# Patient Record
Sex: FEMALE | Race: WHITE | NOT HISPANIC OR LATINO | Employment: FULL TIME | URBAN - METROPOLITAN AREA
[De-identification: names, ages, dates, MRNs, and addresses within clinical notes are randomized per-mention and may not be internally consistent; named-entity substitution may affect disease eponyms.]

---

## 2017-03-24 NOTE — DISCHARGE INSTRUCTIONS
"Post-Operative Instructions Hindfoot/Ankle/Tendon Surgery Patients  J Carlos Mcdonald M.D.    Board Certified  Fellowship, Foot and Ankle Surgery  Member, American Academy of Orthopaedic Surgeons  American Orthopaedic Foot and Ankle Society    Direct Phone 9:00am to 5:00pm [phone]  After Hours/24 Hour On Call [phone]    Diet:  ? Take all prescribed medications with food   ? Narcotic pain medication can cause constipation  ? Avoid alcoholic beverages while taking pain medications   ? Increase dietary fiber protein rich foods, fluids post operatively    Activity:  ? Elevate operative foot 90% of the time.  ? \"Swelling runs downhill\" - the more you elevate, the less permanent swelling you will experience  ? Strict non-weight bearing on operative leg.  Crutches, walker, scooter or wheelchair must be used for mobilization.  ? You may be up to the bathroom, to the kitchen for meals and to change locations in the house.  ? You may do sit-ups (NOT BONE GRAFT PATIENTS), leg raises, upper body exercises  ? Adjust your immobilized foot/ankle to relieve pressure on your heel    Special Care Instructions:     ? DO NOT CHANGE OR ALTER THE CAST  ? Keep your cast/splint dry  ? Sponge bath or wrap seal your cast for shower  ? If you have an ILIAC CREST BONE GRAFT, No shower until staples removed   ? Change hip dressing daily:    ? Clean incision with hydrogen peroxide   ? Cover with dry sterile gauze and minimal tape  ? It is not unusual to have some \"numbness\" in foot and ankle following surgery    Report to your Doctor if any of the following occur:  ? Elevated temperature, 101o or higher  ? Increased pain unrelieved by pain medication and/or elevation  ? Tight/loose/wet cast  ? Increased swelling in foot or hip  ? Calf pain  ? Hip graft site drainage  ? For any shortness of breath, DIAL 911, go to the Emergency Room  Medications:  ? Take all of the following medications with food.    ? Aspirin/Ecotrin 325 mg.:  1 " tab 1x/day  ? This is for blood clot prevention  ? If you have a sensitive stomach, Ecotrin can be less irritating  ? If you experience any unusual bruising or bleeding or ringing in the ears, stop this medication and call us  ? Oxycodone  1-2 every 3-4 hours as needed for pain  o You may take 1 tablet with plain Tylenol or Ibuprofen for less severe pain or to decrease nausea/mental effect  ?  Hydrocodone  1-2 every 3-4 hours as needed for pain  o You may take 1 tablet with plain Tylenol or Ibuprofen for less severe pain or to decrease nausea/mental effects  ? Ibuprofen 1 every 6 hours as needed for inflammatory pain        Your Next Appointment:    ? Appt. scheduled for ___Wed 4/12/17___2:00 pm_________________________        Direct Phone 9:00am to 5:00pm [phone]    After Hours/24 Hour On Call [phone]

## 2017-03-30 ENCOUNTER — HOSPITAL ENCOUNTER (OUTPATIENT)
Facility: CLINIC | Age: 57
Discharge: HOME | End: 2017-03-31
Attending: ORTHOPAEDIC SURGERY | Admitting: ORTHOPAEDIC SURGERY
Payer: COMMERCIAL

## 2017-03-30 ENCOUNTER — APPOINTMENT (OUTPATIENT)
Dept: GENERAL RADIOLOGY | Facility: CLINIC | Age: 57
End: 2017-03-30
Attending: ORTHOPAEDIC SURGERY
Payer: COMMERCIAL

## 2017-03-30 ENCOUNTER — SURGERY (OUTPATIENT)
Age: 57
End: 2017-03-30

## 2017-03-30 ENCOUNTER — ANESTHESIA EVENT (OUTPATIENT)
Dept: SURGERY | Facility: CLINIC | Age: 57
End: 2017-03-30
Payer: COMMERCIAL

## 2017-03-30 ENCOUNTER — ANESTHESIA (OUTPATIENT)
Dept: SURGERY | Facility: CLINIC | Age: 57
End: 2017-03-30
Payer: COMMERCIAL

## 2017-03-30 DIAGNOSIS — M19.079 ANKLE ARTHRITIS: Primary | ICD-10-CM

## 2017-03-30 PROCEDURE — 27210995 ZZH RX 272: Performed by: ORTHOPAEDIC SURGERY

## 2017-03-30 PROCEDURE — 40000935 ZZH STATISTIC OUTPATIENT (NON-OBS) EVE

## 2017-03-30 PROCEDURE — 37000009 ZZH ANESTHESIA TECHNICAL FEE, EACH ADDTL 15 MIN: Performed by: ORTHOPAEDIC SURGERY

## 2017-03-30 PROCEDURE — 40000170 ZZH STATISTIC PRE-PROCEDURE ASSESSMENT II: Performed by: ORTHOPAEDIC SURGERY

## 2017-03-30 PROCEDURE — 25000132 ZZH RX MED GY IP 250 OP 250 PS 637: Performed by: ORTHOPAEDIC SURGERY

## 2017-03-30 PROCEDURE — 25000125 ZZHC RX 250

## 2017-03-30 PROCEDURE — C1713 ANCHOR/SCREW BN/BN,TIS/BN: HCPCS | Performed by: ORTHOPAEDIC SURGERY

## 2017-03-30 PROCEDURE — 27210794 ZZH OR GENERAL SUPPLY STERILE: Performed by: ORTHOPAEDIC SURGERY

## 2017-03-30 PROCEDURE — 37000008 ZZH ANESTHESIA TECHNICAL FEE, 1ST 30 MIN: Performed by: ORTHOPAEDIC SURGERY

## 2017-03-30 PROCEDURE — 25800025 ZZH RX 258: Performed by: ORTHOPAEDIC SURGERY

## 2017-03-30 PROCEDURE — 25000125 ZZHC RX 250: Performed by: ANESTHESIOLOGY

## 2017-03-30 PROCEDURE — 27110028 ZZH OR GENERAL SUPPLY NON-STERILE: Performed by: ORTHOPAEDIC SURGERY

## 2017-03-30 PROCEDURE — S0020 INJECTION, BUPIVICAINE HYDRO: HCPCS | Performed by: ORTHOPAEDIC SURGERY

## 2017-03-30 PROCEDURE — 40000934 ZZH STATISTIC OUTPATIENT (NON-OBS) DAY

## 2017-03-30 PROCEDURE — 25000128 H RX IP 250 OP 636: Performed by: ORTHOPAEDIC SURGERY

## 2017-03-30 PROCEDURE — 25000125 ZZHC RX 250: Performed by: ORTHOPAEDIC SURGERY

## 2017-03-30 PROCEDURE — 25000128 H RX IP 250 OP 636

## 2017-03-30 PROCEDURE — 36000056 ZZH SURGERY LEVEL 3 1ST 30 MIN: Performed by: ORTHOPAEDIC SURGERY

## 2017-03-30 PROCEDURE — 40000936 ZZH STATISTIC OUTPATIENT (NON-OBS) NIGHT

## 2017-03-30 PROCEDURE — 71000013 ZZH RECOVERY PHASE 1 LEVEL 1 EA ADDTL HR: Performed by: ORTHOPAEDIC SURGERY

## 2017-03-30 PROCEDURE — 71000012 ZZH RECOVERY PHASE 1 LEVEL 1 FIRST HR: Performed by: ORTHOPAEDIC SURGERY

## 2017-03-30 PROCEDURE — 40000940 XR FOOT PORT RT 2 VW: Mod: RT

## 2017-03-30 PROCEDURE — 36000058 ZZH SURGERY LEVEL 3 EA 15 ADDTL MIN: Performed by: ORTHOPAEDIC SURGERY

## 2017-03-30 DEVICE — IMPLANTABLE DEVICE: Type: IMPLANTABLE DEVICE | Site: FOOT | Status: FUNCTIONAL

## 2017-03-30 RX ORDER — CEFAZOLIN SODIUM 1 G/3ML
1 INJECTION, POWDER, FOR SOLUTION INTRAMUSCULAR; INTRAVENOUS SEE ADMIN INSTRUCTIONS
Status: DISCONTINUED | OUTPATIENT
Start: 2017-03-30 | End: 2017-03-30

## 2017-03-30 RX ORDER — SODIUM CHLORIDE, SODIUM LACTATE, POTASSIUM CHLORIDE, CALCIUM CHLORIDE 600; 310; 30; 20 MG/100ML; MG/100ML; MG/100ML; MG/100ML
INJECTION, SOLUTION INTRAVENOUS CONTINUOUS
Status: DISCONTINUED | OUTPATIENT
Start: 2017-03-30 | End: 2017-03-30 | Stop reason: HOSPADM

## 2017-03-30 RX ORDER — MULTIPLE VITAMINS W/ MINERALS TAB 9MG-400MCG
1 TAB ORAL DAILY
Status: DISCONTINUED | OUTPATIENT
Start: 2017-03-30 | End: 2017-03-31 | Stop reason: HOSPADM

## 2017-03-30 RX ORDER — ACETAMINOPHEN 325 MG/1
650 TABLET ORAL EVERY 6 HOURS PRN
Status: DISCONTINUED | OUTPATIENT
Start: 2017-03-30 | End: 2017-03-31 | Stop reason: HOSPADM

## 2017-03-30 RX ORDER — NALOXONE HYDROCHLORIDE 0.4 MG/ML
.1-.4 INJECTION, SOLUTION INTRAMUSCULAR; INTRAVENOUS; SUBCUTANEOUS
Status: DISCONTINUED | OUTPATIENT
Start: 2017-03-30 | End: 2017-03-31 | Stop reason: HOSPADM

## 2017-03-30 RX ORDER — SODIUM CHLORIDE, SODIUM LACTATE, POTASSIUM CHLORIDE, CALCIUM CHLORIDE 600; 310; 30; 20 MG/100ML; MG/100ML; MG/100ML; MG/100ML
INJECTION, SOLUTION INTRAVENOUS CONTINUOUS
Status: DISCONTINUED | OUTPATIENT
Start: 2017-03-30 | End: 2017-03-31 | Stop reason: HOSPADM

## 2017-03-30 RX ORDER — PROCHLORPERAZINE MALEATE 5 MG
5-10 TABLET ORAL EVERY 6 HOURS PRN
Status: DISCONTINUED | OUTPATIENT
Start: 2017-03-30 | End: 2017-03-31 | Stop reason: HOSPADM

## 2017-03-30 RX ORDER — TRAZODONE HYDROCHLORIDE 50 MG/1
50 TABLET, FILM COATED ORAL
Status: DISCONTINUED | OUTPATIENT
Start: 2017-03-30 | End: 2017-03-31 | Stop reason: HOSPADM

## 2017-03-30 RX ORDER — EPHEDRINE SULFATE 50 MG/ML
INJECTION, SOLUTION INTRAMUSCULAR; INTRAVENOUS; SUBCUTANEOUS PRN
Status: DISCONTINUED | OUTPATIENT
Start: 2017-03-30 | End: 2017-03-30

## 2017-03-30 RX ORDER — CEFAZOLIN SODIUM 2 G/100ML
2 INJECTION, SOLUTION INTRAVENOUS
Status: COMPLETED | OUTPATIENT
Start: 2017-03-30 | End: 2017-03-30

## 2017-03-30 RX ORDER — IBUPROFEN 600 MG/1
600 TABLET, FILM COATED ORAL EVERY 6 HOURS PRN
Status: DISCONTINUED | OUTPATIENT
Start: 2017-03-30 | End: 2017-03-31 | Stop reason: HOSPADM

## 2017-03-30 RX ORDER — LEVOTHYROXINE SODIUM 175 UG/1
175 TABLET ORAL DAILY
Status: DISCONTINUED | OUTPATIENT
Start: 2017-03-30 | End: 2017-03-31 | Stop reason: HOSPADM

## 2017-03-30 RX ORDER — PROPOFOL 10 MG/ML
INJECTION, EMULSION INTRAVENOUS PRN
Status: DISCONTINUED | OUTPATIENT
Start: 2017-03-30 | End: 2017-03-30

## 2017-03-30 RX ORDER — ONDANSETRON 4 MG/1
4 TABLET, ORALLY DISINTEGRATING ORAL EVERY 6 HOURS PRN
Status: DISCONTINUED | OUTPATIENT
Start: 2017-03-30 | End: 2017-03-31 | Stop reason: HOSPADM

## 2017-03-30 RX ORDER — ONDANSETRON 4 MG/1
4 TABLET, ORALLY DISINTEGRATING ORAL EVERY 30 MIN PRN
Status: DISCONTINUED | OUTPATIENT
Start: 2017-03-30 | End: 2017-03-30 | Stop reason: HOSPADM

## 2017-03-30 RX ORDER — CEFAZOLIN SODIUM 2 G/100ML
2 INJECTION, SOLUTION INTRAVENOUS EVERY 8 HOURS
Status: COMPLETED | OUTPATIENT
Start: 2017-03-30 | End: 2017-03-31

## 2017-03-30 RX ORDER — NALOXONE HYDROCHLORIDE 0.4 MG/ML
.1-.4 INJECTION, SOLUTION INTRAMUSCULAR; INTRAVENOUS; SUBCUTANEOUS
Status: DISCONTINUED | OUTPATIENT
Start: 2017-03-30 | End: 2017-03-30 | Stop reason: HOSPADM

## 2017-03-30 RX ORDER — MEPERIDINE HYDROCHLORIDE 25 MG/ML
12.5 INJECTION INTRAMUSCULAR; INTRAVENOUS; SUBCUTANEOUS
Status: DISCONTINUED | OUTPATIENT
Start: 2017-03-30 | End: 2017-03-30 | Stop reason: HOSPADM

## 2017-03-30 RX ORDER — ASPIRIN 325 MG/1
325 TABLET, FILM COATED ORAL DAILY
Status: DISCONTINUED | OUTPATIENT
Start: 2017-03-31 | End: 2017-03-31 | Stop reason: HOSPADM

## 2017-03-30 RX ORDER — OXYCODONE HYDROCHLORIDE 5 MG/1
5-10 TABLET ORAL
Status: DISCONTINUED | OUTPATIENT
Start: 2017-03-30 | End: 2017-03-31 | Stop reason: HOSPADM

## 2017-03-30 RX ORDER — PROPOFOL 10 MG/ML
INJECTION, EMULSION INTRAVENOUS CONTINUOUS PRN
Status: DISCONTINUED | OUTPATIENT
Start: 2017-03-30 | End: 2017-03-30

## 2017-03-30 RX ORDER — ACETAMINOPHEN 650 MG/1
650 SUPPOSITORY RECTAL EVERY 4 HOURS PRN
Status: DISCONTINUED | OUTPATIENT
Start: 2017-03-30 | End: 2017-03-30 | Stop reason: HOSPADM

## 2017-03-30 RX ORDER — FENTANYL CITRATE 0.05 MG/ML
25-50 INJECTION, SOLUTION INTRAMUSCULAR; INTRAVENOUS
Status: DISCONTINUED | OUTPATIENT
Start: 2017-03-30 | End: 2017-03-30 | Stop reason: HOSPADM

## 2017-03-30 RX ORDER — ALBUTEROL SULFATE 0.83 MG/ML
2.5 SOLUTION RESPIRATORY (INHALATION) EVERY 4 HOURS PRN
Status: DISCONTINUED | OUTPATIENT
Start: 2017-03-30 | End: 2017-03-30 | Stop reason: HOSPADM

## 2017-03-30 RX ORDER — KETOROLAC TROMETHAMINE 30 MG/ML
30 INJECTION, SOLUTION INTRAMUSCULAR; INTRAVENOUS EVERY 6 HOURS
Status: COMPLETED | OUTPATIENT
Start: 2017-03-30 | End: 2017-03-31

## 2017-03-30 RX ORDER — IBUPROFEN 600 MG/1
600 TABLET, FILM COATED ORAL EVERY 6 HOURS PRN
Qty: 60 TABLET | Refills: 0 | Status: ON HOLD | OUTPATIENT
Start: 2017-03-30 | End: 2021-03-04

## 2017-03-30 RX ORDER — FENTANYL CITRATE 50 UG/ML
INJECTION, SOLUTION INTRAMUSCULAR; INTRAVENOUS PRN
Status: DISCONTINUED | OUTPATIENT
Start: 2017-03-30 | End: 2017-03-30

## 2017-03-30 RX ORDER — HYDROXYZINE HYDROCHLORIDE 25 MG/1
25 TABLET, FILM COATED ORAL EVERY 6 HOURS PRN
Status: DISCONTINUED | OUTPATIENT
Start: 2017-03-30 | End: 2017-03-31 | Stop reason: HOSPADM

## 2017-03-30 RX ORDER — ONDANSETRON 2 MG/ML
4 INJECTION INTRAMUSCULAR; INTRAVENOUS EVERY 30 MIN PRN
Status: DISCONTINUED | OUTPATIENT
Start: 2017-03-30 | End: 2017-03-30 | Stop reason: HOSPADM

## 2017-03-30 RX ORDER — ONDANSETRON 2 MG/ML
4 INJECTION INTRAMUSCULAR; INTRAVENOUS EVERY 6 HOURS PRN
Status: DISCONTINUED | OUTPATIENT
Start: 2017-03-30 | End: 2017-03-31 | Stop reason: HOSPADM

## 2017-03-30 RX ORDER — TETRACAINE HCL 10 MG/ML
INJECTION SUBARACHNOID PRN
Status: DISCONTINUED | OUTPATIENT
Start: 2017-03-30 | End: 2017-03-30

## 2017-03-30 RX ORDER — HYDROMORPHONE HYDROCHLORIDE 1 MG/ML
.3-.5 INJECTION, SOLUTION INTRAMUSCULAR; INTRAVENOUS; SUBCUTANEOUS
Status: DISCONTINUED | OUTPATIENT
Start: 2017-03-30 | End: 2017-03-31 | Stop reason: HOSPADM

## 2017-03-30 RX ORDER — OXYCODONE HYDROCHLORIDE 5 MG/1
5-10 TABLET ORAL
Qty: 60 TABLET | Refills: 0 | Status: ON HOLD | OUTPATIENT
Start: 2017-03-30 | End: 2021-03-02

## 2017-03-30 RX ORDER — BUPIVACAINE HYDROCHLORIDE 2.5 MG/ML
INJECTION, SOLUTION EPIDURAL; INFILTRATION; INTRACAUDAL PRN
Status: DISCONTINUED | OUTPATIENT
Start: 2017-03-30 | End: 2017-03-30 | Stop reason: HOSPADM

## 2017-03-30 RX ORDER — ASPIRIN 325 MG/1
325 TABLET, FILM COATED ORAL DAILY
Qty: 360 EACH | Refills: 0 | Status: ON HOLD | OUTPATIENT
Start: 2017-03-31 | End: 2021-03-02

## 2017-03-30 RX ADMIN — TETRACAINE HCL 1 ML: 10 INJECTION SUBARACHNOID at 07:43

## 2017-03-30 RX ADMIN — SODIUM CHLORIDE, POTASSIUM CHLORIDE, SODIUM LACTATE AND CALCIUM CHLORIDE: 600; 310; 30; 20 INJECTION, SOLUTION INTRAVENOUS at 13:31

## 2017-03-30 RX ADMIN — PROPOFOL 20 MG: 10 INJECTION, EMULSION INTRAVENOUS at 07:46

## 2017-03-30 RX ADMIN — SODIUM CHLORIDE, POTASSIUM CHLORIDE, SODIUM LACTATE AND CALCIUM CHLORIDE: 600; 310; 30; 20 INJECTION, SOLUTION INTRAVENOUS at 08:31

## 2017-03-30 RX ADMIN — MIDAZOLAM HYDROCHLORIDE 2 MG: 1 INJECTION, SOLUTION INTRAMUSCULAR; INTRAVENOUS at 07:34

## 2017-03-30 RX ADMIN — DEXMEDETOMIDINE 12 MCG: 100 INJECTION, SOLUTION, CONCENTRATE INTRAVENOUS at 07:34

## 2017-03-30 RX ADMIN — MIDAZOLAM HYDROCHLORIDE 1 MG: 1 INJECTION, SOLUTION INTRAMUSCULAR; INTRAVENOUS at 08:05

## 2017-03-30 RX ADMIN — PROPOFOL 170 MG: 10 INJECTION, EMULSION INTRAVENOUS at 09:09

## 2017-03-30 RX ADMIN — CEFAZOLIN 2 G: 1 INJECTION, POWDER, FOR SOLUTION INTRAMUSCULAR; INTRAVENOUS at 10:27

## 2017-03-30 RX ADMIN — DEXMEDETOMIDINE 8 MCG: 100 INJECTION, SOLUTION, CONCENTRATE INTRAVENOUS at 07:40

## 2017-03-30 RX ADMIN — HYDROXYZINE HYDROCHLORIDE 25 MG: 25 TABLET ORAL at 17:50

## 2017-03-30 RX ADMIN — KETOROLAC TROMETHAMINE 30 MG: 30 INJECTION, SOLUTION INTRAMUSCULAR at 10:28

## 2017-03-30 RX ADMIN — PROPOFOL 75 MCG/KG/MIN: 10 INJECTION, EMULSION INTRAVENOUS at 07:47

## 2017-03-30 RX ADMIN — KETOROLAC TROMETHAMINE 30 MG: 30 INJECTION, SOLUTION INTRAMUSCULAR at 16:21

## 2017-03-30 RX ADMIN — PROPOFOL 10 MG: 10 INJECTION, EMULSION INTRAVENOUS at 07:48

## 2017-03-30 RX ADMIN — THROMBIN, TOPICAL (BOVINE) 5000 UNITS: KIT at 08:28

## 2017-03-30 RX ADMIN — CEFAZOLIN SODIUM 2 G: 2 INJECTION, SOLUTION INTRAVENOUS at 07:50

## 2017-03-30 RX ADMIN — FENTANYL CITRATE 50 MCG: 50 INJECTION, SOLUTION INTRAMUSCULAR; INTRAVENOUS at 07:38

## 2017-03-30 RX ADMIN — KETOROLAC TROMETHAMINE 30 MG: 30 INJECTION, SOLUTION INTRAMUSCULAR at 22:19

## 2017-03-30 RX ADMIN — HYDROMORPHONE HYDROCHLORIDE 0.5 MG: 1 INJECTION, SOLUTION INTRAMUSCULAR; INTRAVENOUS; SUBCUTANEOUS at 16:36

## 2017-03-30 RX ADMIN — HYDROMORPHONE HYDROCHLORIDE 0.5 MG: 1 INJECTION, SOLUTION INTRAMUSCULAR; INTRAVENOUS; SUBCUTANEOUS at 20:45

## 2017-03-30 RX ADMIN — GENTAMICIN SULFATE 1000 ML: 40 INJECTION, SOLUTION INTRAMUSCULAR; INTRAVENOUS at 08:28

## 2017-03-30 RX ADMIN — Medication 5 MG: at 09:31

## 2017-03-30 RX ADMIN — GENTAMICIN SULFATE 1000 ML: 40 INJECTION, SOLUTION INTRAMUSCULAR; INTRAVENOUS at 09:09

## 2017-03-30 RX ADMIN — CEFAZOLIN SODIUM 2 G: 2 INJECTION, SOLUTION INTRAVENOUS at 18:18

## 2017-03-30 RX ADMIN — Medication 5 MG: at 09:34

## 2017-03-30 RX ADMIN — HYDROMORPHONE HYDROCHLORIDE 0.5 MG: 1 INJECTION, SOLUTION INTRAMUSCULAR; INTRAVENOUS; SUBCUTANEOUS at 18:21

## 2017-03-30 RX ADMIN — OXYCODONE HYDROCHLORIDE 10 MG: 5 TABLET ORAL at 17:50

## 2017-03-30 RX ADMIN — FENTANYL CITRATE 50 MCG: 50 INJECTION, SOLUTION INTRAMUSCULAR; INTRAVENOUS at 07:34

## 2017-03-30 RX ADMIN — FENTANYL CITRATE 100 MCG: 50 INJECTION, SOLUTION INTRAMUSCULAR; INTRAVENOUS at 09:19

## 2017-03-30 RX ADMIN — BUPIVACAINE HYDROCHLORIDE 20 ML: 2.5 INJECTION, SOLUTION EPIDURAL; INFILTRATION; INTRACAUDAL at 08:57

## 2017-03-30 RX ADMIN — MIDAZOLAM HYDROCHLORIDE 0.5 MG: 1 INJECTION, SOLUTION INTRAMUSCULAR; INTRAVENOUS at 08:57

## 2017-03-30 RX ADMIN — DEXMEDETOMIDINE 0.3 MCG/KG/HR: 100 INJECTION, SOLUTION, CONCENTRATE INTRAVENOUS at 07:56

## 2017-03-30 RX ADMIN — ONDANSETRON 4 MG: 2 INJECTION INTRAMUSCULAR; INTRAVENOUS at 10:29

## 2017-03-30 RX ADMIN — OXYCODONE HYDROCHLORIDE 10 MG: 5 TABLET ORAL at 21:14

## 2017-03-30 RX ADMIN — HYDROMORPHONE HYDROCHLORIDE 0.5 MG: 1 INJECTION, SOLUTION INTRAMUSCULAR; INTRAVENOUS; SUBCUTANEOUS at 22:52

## 2017-03-30 RX ADMIN — SODIUM CHLORIDE, POTASSIUM CHLORIDE, SODIUM LACTATE AND CALCIUM CHLORIDE: 600; 310; 30; 20 INJECTION, SOLUTION INTRAVENOUS at 07:31

## 2017-03-30 RX ADMIN — HYDROMORPHONE HYDROCHLORIDE 0.5 MG: 1 INJECTION, SOLUTION INTRAMUSCULAR; INTRAVENOUS; SUBCUTANEOUS at 14:43

## 2017-03-30 RX ADMIN — OXYCODONE HYDROCHLORIDE 10 MG: 5 TABLET ORAL at 14:45

## 2017-03-30 ASSESSMENT — PAIN DESCRIPTION - DESCRIPTORS
DESCRIPTORS: ACHING;CONSTANT
DESCRIPTORS: ACHING
DESCRIPTORS: ACHING;CONSTANT
DESCRIPTORS: ACHING;CONSTANT;SHOOTING

## 2017-03-30 NOTE — IP AVS SNAPSHOT
[facility]    [address] [place] [id number]    Phone:  [phone]                                       After Visit Summary   3/30/2017    Shannon Beckwith    MRN: [id number]           After Visit Summary Signature Page     I have received my discharge instructions, and my questions have been answered. I have discussed any challenges I see with this plan with the nurse or doctor.    ..........................................................................................................................................  Patient/Patient Representative Signature      ..........................................................................................................................................  Patient Representative Print Name and Relationship to Patient    ..................................................               ................................................  Date                                            Time    ..........................................................................................................................................  Reviewed by Signature/Title    ...................................................              ..............................................  Date                                                            Time

## 2017-03-30 NOTE — OR NURSING
Report from Lorrie BADILLO RN. Pt has met discharge criteria, report was called, waiting for bed to become available.

## 2017-03-30 NOTE — PLAN OF CARE
Problem: Goal Outcome Summary  Goal: Goal Outcome Summary  Outcome: No Change  Assumed care at 1330. Pt is a/o. VSS. Increased pain in R LE. Medicated with dilaudid and oxy. Needing dilaudid q2h.  Tolerating diet. CMS intact. PCD on. Voiding at [facility]. Ice applied on R hip. Dressing intact. No drainage.  Leg elevated. IV infusing. PT in am. Will  monitor

## 2017-03-30 NOTE — ANESTHESIA PROCEDURE NOTES
Peripheral nerve/Neuraxial procedure note : intrathecal  Pre-Procedure  Performed by WINSTON ALICIA  Location: OR      Pre-Anesthestic Checklist: patient identified, risks and benefits discussed, informed consent and pre-op evaluation    Timeout  Correct Patient: Yes   Correct Procedure: Yes   Correct Site: Yes     Correct Position: Yes     .   Procedure Documentation    .    Procedure:    Intrathecal.  Insertion Site:L3-4  (midline approach)      Patient Prep;mask, sterile gloves, povidone-iodine 7.5% surgical scrub.  .  Needle: (). # of attempts: 1. # of redirects:. Spinal Needle: Rajwinder-Marty 24 G. 3.5 in.  Introducer used. . .     Assessment/Narrative  Paresthesias: No.  .  .  clear CSF fluid removed . Comments:  X 1 into SAB.  No blood or complications.

## 2017-03-30 NOTE — ANESTHESIA POSTPROCEDURE EVALUATION
Patient: Tanna JOELLE Josejana    Procedure(s):  RIGHT SUBTALAR ARTHRODESIS WITH ILIAC CREST BONE GRAFT - Wound Class: I-Clean      Diagnosis:DEGENERATIVE ARTHRITIS RIGHT SUBTALAR JOINT  Diagnosis Additional Information: No value filed.    Anesthesia Type:  Spinal    Note:  Anesthesia Post Evaluation    Patient location during evaluation: PACU  Patient participation: Able to fully participate in evaluation  Level of consciousness: awake  Pain management: adequate  Airway patency: patent  Cardiovascular status: acceptable  Respiratory status: acceptable  Hydration status: acceptable  PONV: controlled     Anesthetic complications: None          Last vitals:  Vitals:    03/30/17 1230 03/30/17 1300 03/30/17 1310   BP: 139/79 136/77 131/85   Pulse:      Resp: 16 15 16   Temp:  36.5  C (97.7  F) 36.4  C (97.6  F)   SpO2: 98% 96% 97%         Electronically Signed By: Ila Coates MD  March 30, 2017  1:39 PM

## 2017-03-30 NOTE — IP AVS SNAPSHOT
MRN:[id number]                      After Visit Summary   3/30/2017    Shannon Beckwith    MRN: [id number]           Thank you!     Thank you for choosing [facility] for your care. Our goal is always to provide you with excellent care. Hearing back from our patients is one way we can continue to improve our services. Please take a few minutes to complete the written survey that you may receive in the mail after you visit with us. Thank you!        Patient Information     Date Of Birth          1960        About your hospital stay     You were admitted on:  March 30, 2017 You last received care in the:  [facility]    You were discharged on:  March 31, 2017       Who to Call     For medical emergencies, please call 911.  For non-urgent questions about your medical care, please call your primary care provider or clinic, [phone]  For questions related to your surgery, please call your surgery clinic        Attending Provider     Provider Specialty    J Carlos Mcdonald MD Orthopedics       Primary Care Provider Office Phone # Fax #    Tejas Garibay [phone]       [address] [place]  [place] [address]        After Care Instructions     No weight bearing                 Additional Services     Dme Referral       CRUTCHES, ROLL ABOUT SCOOTER            Dme Referral       DAILY DRESSING SUPPLIES ILIAC CREST                  Further instructions from your care team       Post-Operative Instructions Hindfoot/Ankle/Tendon Surgery Patients  J Carlos Mcdonald M.D.    Board Certified  Fellowship, Foot and Ankle Surgery  Member, American Academy of Orthopaedic Surgeons  American Orthopaedic Foot and Ankle Society    Direct Phone 9:00am to 5:00pm [phone]  After Hours/24 Hour On Call [phone]    Diet:  ? Take all prescribed medications with food   ? Narcotic pain medication can cause constipation  ? Avoid alcoholic beverages while  "taking pain medications   ? Increase dietary fiber protein rich foods, fluids post operatively    Activity:  ? Elevate operative foot 90% of the time.  ? \"Swelling runs downhill\" - the more you elevate, the less permanent swelling you will experience  ? Strict non-weight bearing on operative leg.  Crutches, walker, scooter or wheelchair must be used for mobilization.  ? You may be up to the bathroom, to the kitchen for meals and to change locations in the house.  ? You may do sit-ups (NOT BONE GRAFT PATIENTS), leg raises, upper body exercises  ? Adjust your immobilized foot/ankle to relieve pressure on your heel    Special Care Instructions:     ? DO NOT CHANGE OR ALTER THE CAST  ? Keep your cast/splint dry  ? Sponge bath or wrap seal your cast for shower  ? If you have an ILIAC CREST BONE GRAFT, No shower until staples removed   ? Change hip dressing daily:    ? Clean incision with hydrogen peroxide   ? Cover with dry sterile gauze and minimal tape  ? It is not unusual to have some \"numbness\" in foot and ankle following surgery    Report to your Doctor if any of the following occur:  ? Elevated temperature, 101o or higher  ? Increased pain unrelieved by pain medication and/or elevation  ? Tight/loose/wet cast  ? Increased swelling in foot or hip  ? Calf pain  ? Hip graft site drainage  ? For any shortness of breath, DIAL 911, go to the Emergency Room  Medications:  ? Take all of the following medications with food.    ? Aspirin/Ecotrin 325 mg.:  1 tab 1x/day  ? This is for blood clot prevention  ? If you have a sensitive stomach, Ecotrin can be less irritating  ? If you experience any unusual bruising or bleeding or ringing in the ears, stop this medication and call us  ? Oxycodone  1-2 every 3-4 hours as needed for pain  o You may take 1 tablet with plain Tylenol or Ibuprofen for less severe pain or to decrease nausea/mental effect  ?  Hydrocodone  1-2 every 3-4 hours as needed for pain  o You may take 1 tablet " "with plain Tylenol or Ibuprofen for less severe pain or to decrease nausea/mental effects  ? Ibuprofen 1 every 6 hours as needed for inflammatory pain        Your Next Appointment:    ? Appt. scheduled for ___17___2:00 pm_________________________        Direct Phone 9:00am to 5:00pm [phone]    After Hours/24 Hour On Call [phone]      Pending Results     No orders found for last 3 day(s).            Admission Information     Date & Time Provider Department Dept. Phone    3/30/2017 J Carlos Mcdonald MD [facility] [phone]      Your Vitals Were     Blood Pressure Pulse Temperature Respirations Height Weight    137/70 (BP Location: Right arm) 73 98.4  F (36.9  C) (Oral) 16 1.753 m (5' 9\") 88.5 kg (195 lb 3.2 oz)    Pulse Oximetry BMI (Body Mass Index)                97% 28.83 kg/m2          [employer] Information     [employer] lets you send messages to your doctor, view your test results, renew your prescriptions, schedule appointments and more. To sign up, go to [email or web address] . Click on \"Log in\" on the left side of the screen, which will take you to the Welcome page. Then click on \"Sign up Now\" on the right side of the page.     You will be asked to enter the access code listed below, as well as some personal information. Please follow the directions to create your username and password.     Your access code is: [email or web address]  Expires: 2017  9:55 AM     Your access code will  in 90 days. If you need help or a new code, please call your [facility] clinic or [phone].        Care EveryWhere ID     This is your Care EveryWhere ID. This could be used by other organizations to access your [facility] medical records  [id number]           Review of your medicines      START taking        Dose / Directions    aspirin - buffered 325 MG Tabs tablet   Commonly known as:  ASCRIPTIN        Dose:  325 mg   Take 1 tablet (325 mg) by mouth daily   Quantity:  360 each "   Refills:  0       order for DME        Equipment being ordered: Walker Wheels () and Walker () Treatment Diagnosis: Difficulty ambulating   Quantity:  1 each   Refills:  0       oxyCODONE 5 MG IR tablet   Commonly known as:  ROXICODONE        Dose:  5-10 mg   Take 1-2 tablets (5-10 mg) by mouth every 3 hours as needed for moderate to severe pain   Quantity:  60 tablet   Refills:  0         CONTINUE these medicines which may have CHANGED, or have new prescriptions. If we are uncertain of the size of tablets/capsules you have at home, strength may be listed as something that might have changed.        Dose / Directions    ibuprofen 600 MG tablet   Commonly known as:  ADVIL/MOTRIN   This may have changed:    - medication strength  - how much to take  - reasons to take this        Dose:  600 mg   Take 1 tablet (600 mg) by mouth every 6 hours as needed for other (inflammatory pain)   Quantity:  60 tablet   Refills:  0         CONTINUE these medicines which have NOT CHANGED        Dose / Directions    CHLORTHALIDONE PO        Dose:  12.5 mg   Take 12.5 mg by mouth daily (0.5 x 25 mg tablet = 12.5 mg dose)   Refills:  0       MULTI FOR HER PO        Dose:  1 tablet   Take 1 tablet by mouth daily   Refills:  0       SYNTHROID PO        Dose:  175 mcg   Take 175 mcg by mouth daily   Refills:  0       TRAZODONE HCL PO        Dose:  50 mg   Take 50 mg by mouth nightly as needed for sleep   Refills:  0       ZOLOFT PO        Dose:  50 mg   Take 50 mg by mouth daily   Refills:  0            Where to get your medicines      These medications were sent to [facility] - [address]  [address] [place] [address], [place] [address]     Phone:  [phone]     aspirin - buffered 325 MG Tabs tablet    ibuprofen 600 MG tablet         Some of these will need a paper prescription and others can be bought over the counter. Ask your nurse if you have questions.     Bring a paper prescription for  each of these medications     order for DME    oxyCODONE 5 MG IR tablet                Protect others around you: Learn how to safely use, store and throw away your medicines at www.disposemymeds.org.             Medication List: This is a list of all your medications and when to take them. Check marks below indicate your daily home schedule. Keep this list as a reference.      Medications           Morning Afternoon Evening Bedtime As Needed    aspirin - buffered 325 MG Tabs tablet   Commonly known as:  ASCRIPTIN   Take 1 tablet (325 mg) by mouth daily   Last time this was given:  325 mg on 3/31/2017  9:02 AM                                CHLORTHALIDONE PO   Take 12.5 mg by mouth daily (0.5 x 25 mg tablet = 12.5 mg dose)   Last time this was given:  12.5 mg on 3/31/2017  9:02 AM                                ibuprofen 600 MG tablet   Commonly known as:  ADVIL/MOTRIN   Take 1 tablet (600 mg) by mouth every 6 hours as needed for other (inflammatory pain)                                MULTI FOR HER PO   Take 1 tablet by mouth daily                                order for DME   Equipment being ordered: Walker Wheels () and Walker () Treatment Diagnosis: Difficulty ambulating                                oxyCODONE 5 MG IR tablet   Commonly known as:  ROXICODONE   Take 1-2 tablets (5-10 mg) by mouth every 3 hours as needed for moderate to severe pain   Last time this was given:  10 mg on 3/31/2017  9:01 AM                                SYNTHROID PO   Take 175 mcg by mouth daily                                TRAZODONE HCL PO   Take 50 mg by mouth nightly as needed for sleep                                ZOLOFT PO   Take 50 mg by mouth daily

## 2017-03-30 NOTE — ANESTHESIA CARE TRANSFER NOTE
Patient: [place] Beckwith    Procedure(s):  RIGHT SUBTALAR ARTHRODESIS WITH ILIAC CREST BONE GRAFT - Wound Class: I-Clean      Diagnosis: DEGENERATIVE ARTHRITIS RIGHT SUBTALAR JOINT  Diagnosis Additional Information: No value filed.    Anesthesia Type:   Spinal     Note:  Airway :Face Mask  Patient transferred to:PACU  Comments: Patient spontaneously breathing, adequate vT, VSS and follows commands.  LMA removed.  To PACU, monitors and alarms on, report to RN.      Vitals: (Last set prior to Anesthesia Care Transfer)    CRNA VITALS  3/30/2017 1014 - 3/30/2017 1050      3/30/2017             Pulse: 102    SpO2: 98 %    Resp Rate (set): 10                Electronically Signed By: JOAO Go CRNA  March 30, 2017  10:50 AM

## 2017-03-30 NOTE — OP NOTE
PROCEDURE/SERVICE DATE:  03/30/2017.      PREOPERATIVE DIAGNOSIS:  Post-traumatic arthritis, right subtalar joint.      POSTOPERATIVE DIAGNOSIS:  Post-traumatic arthritis, right subtalar joint.      PROCEDURES:   1.  Right iliac crest bone graft.   2.  Right subtalar arthrodesis.      DESCRIPTION OF PROCEDURE:  After adequate general anesthetic was obtained, Rafael Beckwith's right iliac crest and right foot and ankle were prepped and draped in the usual sterile fashion.  Thigh tourniquet was utilized.  We turned our attention first to obtainment of the iliac crest.  Curvilinear incision was made over the anterior iliac crest.  This was carried down through the subcutaneous tissue.  Fascia was incised in line with the skin incision and subperiosteally stripped off a small portion of the dorsal crest.  Utilizing the Micro-Aire microsagittal saw, dorsal cortical window was made and elevated.  Cancellous bone graft was removed with a curet.  Wound was thoroughly irrigated with antibiotic solution.  Thrombin-soaked Gelfoam was placed in the bicortical cavity.  Dorsal cortical window was closed and impacted.  Fascia was closed with interrupted #1 Vicryl, subcutaneous tissue closed with a running 0 and 2-0 Vicryl and staples for the skin.  Marcaine 0.25% was instilled along the incision line.  Temporary sterile dressing was applied.      We then turned our attention to the main portion of the procedure.  Limb was exsanguinated and tourniquet raised.  A dorsal longitudinal skin incision was made to the medial aspect of the talonavicular joint, carried down through the subcutaneous tissue and the interval between the extensor hallucis and tibialis anterior developed.  Soft tissue was subperiosteally stripped off a small portion of the talar neck.        We then turned our attention laterally to the subtalar joint.  A longitudinal skin incision was made from the distal fibula to the anterior process of the calcaneus and carried  down through the subcutaneous tissue into the sinus tarsi.  The malunited hypertrophic bone from the lateral process of the talus was resected.  There was severe arthritic change and flattening of the middle and posterior facets.  Utilizing osteotome, curet and rongeur, the surfaces were decorticated.  Wound was thoroughly irrigated with antibiotic solution.  Previously obtained iliac crest bone graft was placed and impacted.  With the hindfoot held in the correct position, diverging guide pins for the Ace 6.5 screw set were placed from the talus to the calcaneus.  Appropriate screws were chosen, countersunk and placed.  There was excellent fixation and alignment.  This was confirmed with intraoperative radiograph.        Wounds were then thoroughly irrigated with antibiotic solution and closure performed in layers utilizing running 0 and 2-0 Vicryl and 3-0 Prolene vertical mattress stitch for the skin.  Marcaine 0.25% was instilled along the incision lines.  Sterile Alo Arteaga dressing was applied.  Tourniquet was released after 65 minutes with good capillary refill.  The patient was taken to the Recovery Room in good condition.         J CARLOS COBB MD             D: 2017 10:41   T: 2017 14:30   MT: [id number]      Name:     CRISTIAN PEREZ   MRN:      [id number]        Account:        [id number]   :      1960           Procedure Date: 2017      Document: [id number]       cc: J Carlos Cobb MD

## 2017-03-30 NOTE — ANESTHESIA PREPROCEDURE EVALUATION
Anesthesia Evaluation     . Pt has had prior anesthetic.     History of anesthetic complications   - difficult intubation        ROS/MED HX    ENT/Pulmonary: Comment: Wt loss now no MARIA M, subglottic stenosis dilated 2016, no stridor currently      (-) sleep apnea   Neurologic:       Cardiovascular:     (+) hypertension----. : . . . :. .      (-) CONTE   METS/Exercise Tolerance:     Hematologic:         Musculoskeletal:         GI/Hepatic: Comment: EtOH abuse in remission       (-) GERD   Renal/Genitourinary:         Endo:     (+) thyroid problem Obesity, .      Psychiatric:         Infectious Disease:         Malignancy:         Other:                     Physical Exam      Airway   Mallampati: II  TM distance: >3 FB  Neck ROM: full    Dental   (+) caps and chipped    Cardiovascular   Rhythm and rate: regular      Pulmonary    breath sounds clear to auscultation                    Anesthesia Plan      History & Physical Review  History and physical reviewed and following examination; no interval change.    ASA Status:  3 .        Plan for Spinal     Difficult intuibation cart      Postoperative Care      Consents  Anesthetic plan, risks, benefits and alternatives discussed with:  Patient..                          .  Procedure: Procedure(s):  ARTHRODESIS FOOT  GRAFT BONE FROM ILIAC CREST  Preop diagnosis: DEGENERATIVE ARTHRITIS RIGHT SUBTALAR JOINT    Allergies   Allergen Reactions     Nkda [No Known Drug Allergies]      Past Medical History:   Diagnosis Date     Arthritis      Chemical dependency (H) sober since July 2016 per PCP, multiple prior inpatient hospital stays for EtOH abuse     Depression      Dyspnea on exertion      History of blood transfusion      Hypertension pre hypertensive     Obese     eating disorder     Problem, psychiatric     Alcohlic     Sleep apnea 2000    Not a problem since weight loss surgery      Thyroid disease      Tracheal stenosis      Past Surgical History:   Procedure Laterality  Date     ABDOMEN SURGERY      gastric bypass, elaine-en-y     APPENDECTOMY       BRONCHOSCOPY RIGID  2/14/2012    Procedure:BRONCHOSCOPY RIGID; Surgeon:TONNY PENA; Location:[facility] OR     INJECT STEROID (LOCATION) N/A 10/20/2016    Procedure: INJECT STEROID (LOCATION);  Surgeon: Aylin Del Rio MD;  Location: [facility] OR     LARYNGOSCOPY, ESOPHAGOSCOPY WITH DILATION, COMBINED  2/14/2012    Procedure:COMBINED LARYNGOSCOPY, ESOPHAGOSCOPY WITH DILATION; Direct Laryngoscopy, Rigid Bronchoscopy, Balloon Dilation of Subglottic Stenosis  **latex safe; Surgeon:TONNY PENA; Location:[facility] OR     LASER CO2 LARYNGOSCOPY, COMPLEX  11/21/2012    Procedure: LASER CO2 LARYNGOSCOPY, COMPLEX;  Microdirect Laryngoscopy, Incision and Dilation,  CO2 Laser, Kenalog Injection;  Surgeon: Aylin Del Rio MD;  Location: [facility] OR     LASER CO2 LARYNGOSCOPY, COMPLEX N/A 10/20/2016    Procedure: LASER CO2 LARYNGOSCOPY, COMPLEX;  Surgeon: Aylin Del Rio MD;  Location: [facility] OR     subglottic dilatation       wisdom teeth removed[       Prior to Admission medications    Medication Sig Start Date End Date Taking? Authorizing Provider   ibuprofen (ADVIL/MOTRIN) 600 MG tablet Take 1 tablet (600 mg) by mouth every 6 hours as needed for other (inflammatory pain) 3/30/17  Yes J Carlos Mcdonald MD   oxyCODONE (ROXICODONE) 5 MG IR tablet Take 1-2 tablets (5-10 mg) by mouth every 3 hours as needed for moderate to severe pain 3/30/17  Yes J Carlos Mcdonald MD   aspirin - buffered (ASCRIPTIN) 325 MG TABS tablet Take 1 tablet (325 mg) by mouth daily 3/31/17  Yes J Carlos Mcdonald MD   Levothyroxine Sodium (SYNTHROID PO) Take 175 mcg by mouth daily   Yes Reported, Patient   TRAZODONE HCL PO Take 50 mg by mouth nightly as needed for sleep   Yes Reported, Patient   Multiple Vitamins-Minerals (MULTI FOR HER PO) Take 1 tablet by mouth daily   Yes Reported, Patient   Sertraline HCl (ZOLOFT PO) Take 50 mg by mouth daily    Yes  Reported, Patient   CHLORTHALIDONE PO Take 12.5 mg by mouth daily (0.5 x 25 mg tablet = 12.5 mg dose)   Yes Reported, Patient     Current Facility-Administered Medications Ordered in Epic   Medication Dose Route Frequency Last Rate Last Dose     ceFAZolin (ANCEF) 1 g vial to attach to  ml bag for ADULT or 50 ml bag for PEDS  1 g Intravenous See Admin Instructions         chlorthalidone (HYGROTON) half-tab 12.5 mg  12.5 mg Oral Daily         levothyroxine (SYNTHROID/LEVOTHROID) tablet 175 mcg  175 mcg Oral Daily         MULTI FOR HER TABS   Oral Daily         sertraline (ZOLOFT) tablet 50 mg  50 mg Oral Daily         traZODone (DESYREL) tablet 50 mg  50 mg Oral At Bedtime PRN         sodium chloride (PF) 0.9% PF flush 3 mL  3 mL Intravenous Q1H PRN         sodium chloride (PF) 0.9% PF flush 3 mL  3 mL Intravenous Q8H         sodium chloride (PF) 0.9% PF flush 3 mL  3 mL Intravenous Q1H PRN         naloxone (NARCAN) injection 0.1-0.4 mg  0.1-0.4 mg Intravenous Q2 Min PRN         lactated ringers infusion   Intravenous Continuous         HYDROmorphone (PF) (DILAUDID) injection 0.3-0.5 mg  0.3-0.5 mg Intravenous Q2H PRN         oxyCODONE (ROXICODONE) IR tablet 5-10 mg  5-10 mg Oral Q3H PRN         ketorolac (TORADOL) injection 30 mg  30 mg Intravenous Q6H         ibuprofen (ADVIL/MOTRIN) tablet 600 mg  600 mg Oral Q6H PRN         hydrOXYzine (ATARAX) tablet 25 mg  25 mg Oral Q6H PRN         acetaminophen (TYLENOL) tablet 650 mg  650 mg Oral Q6H PRN         ondansetron (ZOFRAN-ODT) ODT tab 4 mg  4 mg Oral Q6H PRN        Or     ondansetron (ZOFRAN) injection 4 mg  4 mg Intravenous Q6H PRN         prochlorperazine (COMPAZINE) injection 5-10 mg  5-10 mg Intravenous Q6H PRN        Or     prochlorperazine (COMPAZINE) tablet 5-10 mg  5-10 mg Oral Q6H PRN         ceFAZolin sodium-dextrose (ANCEF) infusion 2 g  2 g Intravenous Q8H         [START ON 3/31/2017] aspirin - buffered (ASCRIPTIN) 325 MG tablet 325 mg  325 mg Oral  Daily         midazolam (VERSED) injection    PRN   1 mg at 03/30/17 0805     fentaNYL Citrate (PF) (SUBLIMAZE) injection    PRN   50 mcg at 03/30/17 0738     dexmedetomidine (PRECEDEX) 400 mcg in NaCl 0.9 % 100 mL infusion  400 mcg  Continuous PRN 6.6 mL/hr at 03/30/17 0814 0.3 mcg/kg/hr at 03/30/17 0814     propofol (DIPRIVAN) injection 10 mg/mL vial    PRN   10 mg at 03/30/17 0745     dexmedetomidine (PRECEDEX) 4 mcg/mL bolus  200 mcg  Continuous PRN   8 mcg at 03/30/17 0740     propofol (DIPRIVAN) infusion   Intravenous Continuous PRN 13.3 mL/hr at 03/30/17 0835 25 mcg/kg/min at 03/30/17 0835     Current Outpatient Prescriptions Ordered in Epic   Medication     ibuprofen (ADVIL/MOTRIN) 600 MG tablet     oxyCODONE (ROXICODONE) 5 MG IR tablet     [START ON 3/31/2017] aspirin - buffered (ASCRIPTIN) 325 MG TABS tablet     Wt Readings from Last 1 Encounters:   03/30/17 88.5 kg (195 lb 3.2 oz)     Temp Readings from Last 1 Encounters:   03/30/17 36.6  C (97.8  F) (Oral)     BP Readings from Last 6 Encounters:   03/30/17 154/87   10/20/16 (!) 145/92   11/21/12 107/68   02/14/12 135/84     Pulse Readings from Last 4 Encounters:   03/30/17 73   10/20/16 70     Resp Readings from Last 1 Encounters:   03/30/17 18     SpO2 Readings from Last 1 Encounters:   03/30/17 99%     No results for input(s): NA, POTASSIUM, CHLORIDE, CO2, ANIONGAP, GLC, BUN, CR, MARCELO in the last 76242 hours.  No results for input(s): WBC, HGB, PLT in the last 42151 hours.  No results for input(s): INR in the last 55854 hours.    Invalid input(s): APTT   RECENT LABS:   ECG:   ECHO:   CXR:

## 2017-03-30 NOTE — PROGRESS NOTES
Admission medication history interview status for the 3/30/2017  admission is complete. See EPIC admission navigator for prior to admission medications     Medication history source reliability:Good    Medication history interview source(s):Patient    Medication history resources (including written lists, pill bottles, clinic record):None    Primary pharmacy[facility]    Additional medication history information not noted on PTA med list :None    Time spent in this activity: 30 minutes    Prior to Admission medications    Medication Sig Last Dose Taking? Auth Provider   IBUPROFEN PO Take 400 mg by mouth every 6 hours as needed for moderate pain 3/29/2017 at PM Yes Reported, Patient   Levothyroxine Sodium (SYNTHROID PO) Take 175 mcg by mouth daily 3/29/2017 at AM Yes Reported, Patient   TRAZODONE HCL PO Take 50 mg by mouth nightly as needed for sleep One Month Ago at PRN Yes Reported, Patient   Multiple Vitamins-Minerals (MULTI FOR HER PO) Take 1 tablet by mouth daily 3/28/2017 at AM Yes Reported, Patient   Sertraline HCl (ZOLOFT PO) Take 50 mg by mouth daily  3/29/2017 at AM Yes Reported, Patient   CHLORTHALIDONE PO Take 12.5 mg by mouth daily (0.5 x 25 mg tablet = 12.5 mg dose) 3/29/2017 at AM Yes Reported, Patient

## 2017-03-31 ENCOUNTER — APPOINTMENT (OUTPATIENT)
Dept: PHYSICAL THERAPY | Facility: CLINIC | Age: 57
End: 2017-03-31
Attending: ORTHOPAEDIC SURGERY
Payer: COMMERCIAL

## 2017-03-31 VITALS
OXYGEN SATURATION: 97 % | HEART RATE: 73 BPM | SYSTOLIC BLOOD PRESSURE: 137 MMHG | BODY MASS INDEX: 28.91 KG/M2 | TEMPERATURE: 98.4 F | HEIGHT: 69 IN | DIASTOLIC BLOOD PRESSURE: 70 MMHG | WEIGHT: 195.2 LBS | RESPIRATION RATE: 16 BRPM

## 2017-03-31 LAB — GLUCOSE BLDC GLUCOMTR-MCNC: 112 MG/DL (ref 70–99)

## 2017-03-31 PROCEDURE — 97161 PT EVAL LOW COMPLEX 20 MIN: CPT | Mod: GP

## 2017-03-31 PROCEDURE — 40000934 ZZH STATISTIC OUTPATIENT (NON-OBS) DAY

## 2017-03-31 PROCEDURE — 40000193 ZZH STATISTIC PT WARD VISIT

## 2017-03-31 PROCEDURE — 97116 GAIT TRAINING THERAPY: CPT | Mod: GP

## 2017-03-31 PROCEDURE — 25000132 ZZH RX MED GY IP 250 OP 250 PS 637: Performed by: ORTHOPAEDIC SURGERY

## 2017-03-31 PROCEDURE — 25000128 H RX IP 250 OP 636: Performed by: ORTHOPAEDIC SURGERY

## 2017-03-31 PROCEDURE — 82962 GLUCOSE BLOOD TEST: CPT

## 2017-03-31 RX ADMIN — HYDROXYZINE HYDROCHLORIDE 25 MG: 25 TABLET ORAL at 09:02

## 2017-03-31 RX ADMIN — HYDROXYZINE HYDROCHLORIDE 25 MG: 25 TABLET ORAL at 01:32

## 2017-03-31 RX ADMIN — CHLORTHALIDONE 12.5 MG: 25 TABLET ORAL at 09:02

## 2017-03-31 RX ADMIN — ACETAMINOPHEN 650 MG: 325 TABLET, FILM COATED ORAL at 05:42

## 2017-03-31 RX ADMIN — OXYCODONE HYDROCHLORIDE 10 MG: 5 TABLET ORAL at 05:42

## 2017-03-31 RX ADMIN — CEFAZOLIN SODIUM 2 G: 2 INJECTION, SOLUTION INTRAVENOUS at 10:11

## 2017-03-31 RX ADMIN — OXYCODONE HYDROCHLORIDE 10 MG: 5 TABLET ORAL at 01:32

## 2017-03-31 RX ADMIN — KETOROLAC TROMETHAMINE 30 MG: 30 INJECTION, SOLUTION INTRAMUSCULAR at 05:45

## 2017-03-31 RX ADMIN — ASPIRIN 325 MG: 325 TABLET, FILM COATED ORAL at 09:02

## 2017-03-31 RX ADMIN — CEFAZOLIN SODIUM 2 G: 2 INJECTION, SOLUTION INTRAVENOUS at 02:36

## 2017-03-31 RX ADMIN — OXYCODONE HYDROCHLORIDE 10 MG: 5 TABLET ORAL at 09:01

## 2017-03-31 ASSESSMENT — PAIN DESCRIPTION - DESCRIPTORS: DESCRIPTORS: ACHING;CONSTANT

## 2017-03-31 NOTE — PLAN OF CARE
Problem: Goal Outcome Summary  Goal: Goal Outcome Summary  PT: Orders received, eval completed, treatment initiated. Pt admitted under outpatient status and is s/p R ankle subtalar arthrodesis, to be NWB on R. Prior to admit pt was living with spouse in a house, can stay on one level, no stairs to enter. Currently requires SBA sit to/from stand with crutches or FWW, CGA for gait of 10 ft with crutches with unsteady balance and pt was not confident or comfortable using then. Then amb another 20 ft with FWW NWB on R with much improved balance and comfort. Amb 200 ft with roll-about and SBA with good safety and technique, steady balance. Pt demonstrates pain, dec strength, balance, activity tolerance and difficulty ambulating and transferring and would benefit from skilled PT services in order to improve this. Recommend discharge to home with family assist. Rehab aide to issue FWW prior to discharge, pt has a roll-about at home already.     Pt has met PT goals, safe for discharge.     Physical Therapy Discharge Summary     Reason for therapy discharge:    All goals and outcomes met, no further needs identified.     Progress towards therapy goal(s). See goals on Care Plan in [facility] electronic health record for goal details.  Goals met     Therapy recommendation(s):    No further therapy is recommended.

## 2017-03-31 NOTE — PROGRESS NOTES
Discharge instructions given to pt and verbalized understanding. appt reviewed with pt. Right hip graph site cleaned and dressing changed scant drainage from old dressing. Pt knows to clean with peroxide and change dressing daily. Sent home with a few gauze and tape. Rx sent homw with pt. Pt verbalized understanding of incision care. RLE to stay NWB and elevated most of the day. Discharging home with walker. Son on his way to pickup pt. Belongings with pt.

## 2017-03-31 NOTE — PROGRESS NOTES
03/31/17 0922   Quick Adds   Type of Visit Initial PT Evaluation   Living Environment   Lives With spouse   Living Arrangements house   Home Accessibility no concerns   Number of Stairs to Enter Home 0   Number of Stairs Within Home 0   Self-Care   Usual Activity Tolerance good   Current Activity Tolerance moderate   Equipment Currently Used at Home none  (owns crutches, rented a roll-about)   Functional Level Prior   Ambulation 0-->independent   Transferring 0-->independent   Fall history within last six months yes   Number of times patient has fallen within last six months 1   Which of the above functional risks had a recent onset or change? ambulation;transferring   General Information   Onset of Illness/Injury or Date of Surgery - Date 03/30/17   Referring Physician J Carlos Mcdonald MD   Patient/Family Goals Statement return home today   Pertinent History of Current Problem (include personal factors and/or comorbidities that impact the POC) Admitted under outpatient status and is s/p R subtalar arthrodesis. PMH: obesity, depression.   Precautions/Limitations fall precautions   Weight-Bearing Status - LLE full weight-bearing   Weight-Bearing Status - RLE nonweight-bearing   Cognitive Status Examination   Orientation orientation to person, place and time   Level of Consciousness alert   Follows Commands and Answers Questions 100% of the time;able to follow multistep instructions   Personal Safety and Judgment intact   Memory intact   Pain Assessment   Patient Currently in Pain Yes, see Vital Sign flowsheet  (3/10 at rest)   Integumentary/Edema   Integumentary/Edema Comments R ankle in soft cast   Posture    Posture Not impaired   Range of Motion (ROM)   ROM Comment R ankle in soft cast, all others WFL   Strength   Strength Comments R ankle NT, all others WFL   Bed Mobility   Bed Mobility Comments Independent   Transfer Skills   Transfer Comments Sit to stand with SBA and crutches, NWB on R   Gait   Gait Comments  "Pt amb 5 ft with crutches and CGA, NWB on R   Balance   Balance Comments Balance unsteady with crutches   Sensory Examination   Sensory Perception Comments Denies numbness or tingling   General Therapy Interventions   Planned Therapy Interventions gait training;transfer training   Clinical Impression   Criteria for Skilled Therapeutic Intervention yes, treatment indicated   PT Diagnosis Difficulty ambulating   Influenced by the following impairments Pain, dec activity tolerance, balance   Functional limitations due to impairments Diffiuclty ambulating and transferring   Clinical Presentation Stable/Uncomplicated   Clinical Presentation Rationale medically stable   Clinical Decision Making (Complexity) Low complexity   Therapy Frequency` daily   Predicted Duration of Therapy Intervention (days/wks) 1 day   Anticipated Equipment Needs at Discharge walker   Anticipated Discharge Disposition Home with Assist   Risk & Benefits of therapy have been explained Yes   Patient, Family & other staff in agreement with plan of care Yes   [facility] TM \"6 Clicks\"   2016, Trustees of [facility], under license to [employer]  All rights reserved.   6 Clicks Short Forms Basic Mobility Inpatient Short Form   [facility]  \"6 Clicks\" V.2 Basic Mobility Inpatient Short Form   1. Turning from your back to your side while in a flat bed without using bedrails? 4 - None   2. Moving from lying on your back to sitting on the side of a flat bed without using bedrails? 4 - None   3. Moving to and from a bed to a chair (including a wheelchair)? 3 - A Little   4. Standing up from a chair using your arms (e.g., wheelchair, or bedside chair)? 3 - A Little   5. To walk in hospital room? 3 - A Little   6. Climbing 3-5 steps with a railing? 2 - A Lot   Basic Mobility Raw Score (Score out of 24.Lower scores equate to lower levels of function) 19   Total Evaluation Time   Total Evaluation Time (Minutes) 15     "

## 2017-03-31 NOTE — PLAN OF CARE
Problem: Discharge Planning  Goal: Discharge Planning (Adult, OB, Behavioral, Peds)  Outcome: Adequate for Discharge Date Met:  03/31/17  Pt A+O. VSS on RA. Lungs clear. BS present. Passed gas. Ate small amt of breakfast. IV SL. RLE elevated on wedge pillow. LLE PCDs while in bed. NWB on RLE. Up to bedside commode with SBA. PT consulted. See notes. Pt will d/c home after 1030 Antibiotic. RLE pain managed with po oxycodone and atarax. Will continue to monitor.

## 2017-03-31 NOTE — PLAN OF CARE
Problem: Discharge Planning  Goal: Discharge Planning (Adult, OB, Behavioral, Peds)  Outcome: Improving  POD#1. Pt is A+O x 4. VSS on RA. RLE elevated on wedge pillow. Incision dressings on RLE on R hip (graft site) are C/D/I. CMS+. Frequently on call light. RLE pain managed with PRNs IV Dilaudid, Oxycodone, Toradol, Atarax. Tearful. Received abx. Ice applied to R hip. PCDs and IS used. Up with A1, NWB. IVF infusing TKO. PT ordered. Voiding. PT ordered.

## 2017-05-23 ENCOUNTER — THERAPY VISIT (OUTPATIENT)
Dept: PHYSICAL THERAPY | Facility: CLINIC | Age: 57
End: 2017-05-23
Payer: COMMERCIAL

## 2017-05-23 DIAGNOSIS — M25.571 ANKLE PAIN, RIGHT: Primary | ICD-10-CM

## 2017-05-23 DIAGNOSIS — Z98.1 ARTHRODESIS STATUS: ICD-10-CM

## 2017-05-23 PROCEDURE — 97110 THERAPEUTIC EXERCISES: CPT | Mod: GP | Performed by: PHYSICAL THERAPIST

## 2017-05-23 PROCEDURE — 97140 MANUAL THERAPY 1/> REGIONS: CPT | Mod: GP | Performed by: PHYSICAL THERAPIST

## 2017-05-23 PROCEDURE — 97161 PT EVAL LOW COMPLEX 20 MIN: CPT | Mod: GP | Performed by: PHYSICAL THERAPIST

## 2017-05-23 NOTE — MR AVS SNAPSHOT
"              After Visit Summary   5/23/2017    Shannon Beckwith    MRN: [id number]           Patient Information     Date Of Birth          1960        Visit Information        Provider Department      5/23/2017 8:30 AM Shirlene Scanlon PT [facility]        Today's Diagnoses     Ankle pain, right    -  1    Arthrodesis status           Follow-ups after your visit        Your next 10 appointments already scheduled     May 25, 2017  8:30 AM CDT   JOHANA Extremity with Shirlene Scanlon PT   [facility] ([facility]    [address]  [place] [facility] [id number] [phone]              Who to contact     If you have questions or need follow up information about today's clinic visit or your schedule please contact [facility] directly at [phone].  Normal or non-critical lab and imaging results will be communicated to you by [employer], letter or phone within 4 business days after the clinic has received the results. If you do not hear from us within 7 days, please contact the clinic through [employer] or phone. If you have a critical or abnormal lab result, we will notify you by phone as soon as possible.  Submit refill requests through [employer] or call your pharmacy and they will forward the refill request to us. Please allow 3 business days for your refill to be completed.          Additional Information About Your Visit        [employer] Information     [employer] lets you send messages to your doctor, view your test results, renew your prescriptions, schedule appointments and more. To sign up, go to [email or web address]."[employer]org/[email or web address] . Click on \"Log in\" on the left side of the screen, which will take you to the Welcome page. Then click on \"Sign up Now\" on the right side of the page.     You will be asked to enter the access code listed below, as well as some " personal information. Please follow the directions to create your username and password.     Your access code is: [email or web address]  Expires: 2017  9:55 AM     Your access code will  in 90 days. If you need help or a new code, please call your [facility] clinic or [phone].        Care EveryWhere ID     This is your Care EveryWhere ID. This could be used by other organizations to access your [facility] medical records  [id number]         Blood Pressure from Last 3 Encounters:   17 137/70   10/20/16 (!) 145/92   12 107/68    Weight from Last 3 Encounters:   17 88.5 kg (195 lb 3.2 oz)   10/20/16 93.3 kg (205 lb 11 oz)   16 94.9 kg (209 lb 3.2 oz)              We Performed the Following     JOHANA Inital Eval Report     Manual Ther Tech, 1+Regions, EA 15 min     PT Eval, Low Complexity ([id number])     Therapeutic Exercises        Primary Care Provider Office Phone # Fax #    Tejas Garibay [phone]       [address] [place]  [place] [address]        Thank you!     Thank you for choosing [facility]  for your care. Our goal is always to provide you with excellent care. Hearing back from our patients is one way we can continue to improve our services. Please take a few minutes to complete the written survey that you may receive in the mail after your visit with us. Thank you!             Your Updated Medication List - Protect others around you: Learn how to safely use, store and throw away your medicines at www.disposemymeds.org.          This list is accurate as of: 17  4:11 PM.  Always use your most recent med list.                   Brand Name Dispense Instructions for use    aspirin - buffered 325 MG Tabs tablet    ASCRIPTIN    360 each    Take 1 tablet (325 mg) by mouth daily       CHLORTHALIDONE PO      Take 12.5 mg by mouth daily (0.5 x 25 mg tablet = 12.5 mg dose)       ibuprofen 600 MG  tablet    ADVIL/MOTRIN    60 tablet    Take 1 tablet (600 mg) by mouth every 6 hours as needed for other (inflammatory pain)       MULTI FOR HER PO      Take 1 tablet by mouth daily       order for DME     1 each    Equipment being ordered: Walker Wheels () and Walker () Treatment Diagnosis: Difficulty ambulating       oxyCODONE 5 MG IR tablet    ROXICODONE    60 tablet    Take 1-2 tablets (5-10 mg) by mouth every 3 hours as needed for moderate to severe pain       SYNTHROID PO      Take 175 mcg by mouth daily       TRAZODONE HCL PO      Take 50 mg by mouth nightly as needed for sleep       ZOLOFT PO      Take 50 mg by mouth daily
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
## 2017-05-25 ENCOUNTER — THERAPY VISIT (OUTPATIENT)
Dept: PHYSICAL THERAPY | Facility: CLINIC | Age: 57
End: 2017-05-25
Payer: COMMERCIAL

## 2017-05-25 DIAGNOSIS — Z98.1 ARTHRODESIS STATUS: ICD-10-CM

## 2017-05-25 DIAGNOSIS — M25.571 ANKLE PAIN, RIGHT: ICD-10-CM

## 2017-05-25 PROCEDURE — 97140 MANUAL THERAPY 1/> REGIONS: CPT | Mod: GP | Performed by: PHYSICAL THERAPIST

## 2017-05-25 PROCEDURE — 97110 THERAPEUTIC EXERCISES: CPT | Mod: GP | Performed by: PHYSICAL THERAPIST

## 2017-05-30 ENCOUNTER — THERAPY VISIT (OUTPATIENT)
Dept: PHYSICAL THERAPY | Facility: CLINIC | Age: 57
End: 2017-05-30
Payer: COMMERCIAL

## 2017-05-30 DIAGNOSIS — Z98.1 ARTHRODESIS STATUS: ICD-10-CM

## 2017-05-30 DIAGNOSIS — M25.571 RIGHT ANKLE PAIN, UNSPECIFIED CHRONICITY: ICD-10-CM

## 2017-05-30 PROCEDURE — 97110 THERAPEUTIC EXERCISES: CPT | Mod: GP | Performed by: PHYSICAL THERAPIST

## 2017-05-30 PROCEDURE — 97140 MANUAL THERAPY 1/> REGIONS: CPT | Mod: GP | Performed by: PHYSICAL THERAPIST

## 2017-06-01 ENCOUNTER — THERAPY VISIT (OUTPATIENT)
Dept: PHYSICAL THERAPY | Facility: CLINIC | Age: 57
End: 2017-06-01
Payer: COMMERCIAL

## 2017-06-01 DIAGNOSIS — M25.571 RIGHT ANKLE PAIN, UNSPECIFIED CHRONICITY: ICD-10-CM

## 2017-06-01 DIAGNOSIS — Z98.1 ARTHRODESIS STATUS: ICD-10-CM

## 2017-06-01 PROCEDURE — 97140 MANUAL THERAPY 1/> REGIONS: CPT | Mod: GP | Performed by: PHYSICAL THERAPIST

## 2017-06-01 PROCEDURE — 97110 THERAPEUTIC EXERCISES: CPT | Mod: GP | Performed by: PHYSICAL THERAPIST

## 2017-06-07 ENCOUNTER — THERAPY VISIT (OUTPATIENT)
Dept: PHYSICAL THERAPY | Facility: CLINIC | Age: 57
End: 2017-06-07
Payer: COMMERCIAL

## 2017-06-07 DIAGNOSIS — M25.571 RIGHT ANKLE PAIN, UNSPECIFIED CHRONICITY: ICD-10-CM

## 2017-06-07 DIAGNOSIS — Z98.1 ARTHRODESIS STATUS: ICD-10-CM

## 2017-06-07 PROCEDURE — 97110 THERAPEUTIC EXERCISES: CPT | Mod: GP | Performed by: PHYSICAL THERAPIST

## 2017-06-07 PROCEDURE — 97140 MANUAL THERAPY 1/> REGIONS: CPT | Mod: GP | Performed by: PHYSICAL THERAPIST

## 2017-06-07 NOTE — PROGRESS NOTES
Subjective:    HPI                    Objective:    System    Physical Exam    General     ROS    Assessment/Plan:      SUBJECTIVE  Subjective changes as noted by pt:  Overall doing well. Still having a fair amount of swelling--has been busy doing things and hasn't elevated as much but has been consistent with HEP otherwise. She reports she is now driving as well.    Current pain level: 1/10     Changes in function:  Yes (See Goal flowsheet attached for changes in current functional level)     Adverse reaction to treatment or activity:  None    OBJECTIVE  Changes in objective findings:  Yes,   Objective: DF AROM=6 and with belt=10     ASSESSMENT  Shannon continues to require intervention to meet STG and LTG's: PT  Patient's symptoms are resolving.  Patient is progressing as expected.  Response to therapy has shown an improvement in  pain level and ROM   Progress made towards STG/LTG?  Yes (See Goal flowsheet attached for updates on achievement of STG and LTG)    PLAN  Current treatment program is being advanced to more complex exercises.    PTA/ATC plan:  N/A    Please refer to the daily flowsheet for treatment today, total treatment time and time spent performing 1:1 timed codes.

## 2017-06-07 NOTE — MR AVS SNAPSHOT
After Visit Summary   6/7/2017    Shannon Beckwith    MRN: [id number]           Patient Information     Date Of Birth          1960        Visit Information        Provider Department      6/7/2017 10:40 AM Zainab Thompson, PT [facility]        Today's Diagnoses     Right ankle pain, unspecified chronicity        Arthrodesis status           Follow-ups after your visit        Your next 10 appointments already scheduled     Jun 09, 2017  8:20 AM CDT   JOHANA Extremity with Natalia Dias, PT   [facility] ([facility])    [address]  [facility] [address]   [phone]            Jun 13, 2017  7:50 AM CDT   JOHANA Extremity with Shirlene Scanlon PT   [facility] ([facility])    [address]  [facility] [address]   [phone]            Denis 15, 2017  8:30 AM CDT   JOHANA Extremity with Shirlene Scanlon, PT   [facility] ([facility])    [address]  [facility] [address]   [phone]              Who to contact     If you have questions or need follow up information about today's clinic visit or your schedule please contact [facility] directly at [phone].  Normal or non-critical lab and imaging results will be communicated to you by MyChart, letter or phone within 4 business days after the clinic has received the results. If you do not hear from us within 7 days, please contact the clinic through MyChart or phone. If you have a critical or abnormal lab result, we will notify you by phone as soon as possible.  Submit refill requests through [facility] or call your pharmacy and they will forward the refill request to us. Please allow 3 business days for your refill to be completed.        "   Additional Information About Your Visit        MyChart Information     [employer] lets you send messages to your doctor, view your test results, renew your prescriptions, schedule appointments and more. To sign up, go to [email or web address] . Click on \"Log in\" on the left side of the screen, which will take you to the Welcome page. Then click on \"Sign up Now\" on the right side of the page.     You will be asked to enter the access code listed below, as well as some personal information. Please follow the directions to create your username and password.     Your access code is: [email or web address]  Expires: 2017  9:55 AM     Your access code will  in 90 days. If you need help or a new code, please call your [place] clinic or [phone].        Care EveryWhere ID     This is your Care EveryWhere ID. This could be used by other organizations to access your [facility] medical records  [id number]         Blood Pressure from Last 3 Encounters:   17 137/70   10/20/16 (!) 145/92   12 107/68    Weight from Last 3 Encounters:   17 88.5 kg (195 lb 3.2 oz)   10/20/16 93.3 kg (205 lb 11 oz)   16 94.9 kg (209 lb 3.2 oz)              We Performed the Following     MANUAL THER TECH,1+REGIONS,EA 15 MIN     THERAPEUTIC EXERCISES        Primary Care Provider Office Phone # Fax #    Tejas Garibay [phone]       [address]  [place] [facility] [address]        Thank you!     Thank you for choosing [facility] THERAP[facility]  for your care. Our goal is always to provide you with excellent care. Hearing back from our patients is one way we can continue to improve our services. Please take a few minutes to complete the written survey that you may receive in the mail after your visit with us. Thank you!             Your Updated Medication List - Protect others around you: Learn how to safely use, store and throw away your " medicines at www.disposemymeds.org.          This list is accurate as of: 6/7/17  2:07 PM.  Always use your most recent med list.                   Brand Name Dispense Instructions for use    aspirin - buffered 325 MG Tabs tablet    ASCRIPTIN    360 each    Take 1 tablet (325 mg) by mouth daily       CHLORTHALIDONE PO      Take 12.5 mg by mouth daily (0.5 x 25 mg tablet = 12.5 mg dose)       ibuprofen 600 MG tablet    ADVIL/MOTRIN    60 tablet    Take 1 tablet (600 mg) by mouth every 6 hours as needed for other (inflammatory pain)       MULTI FOR HER PO      Take 1 tablet by mouth daily       order for DME     1 each    Equipment being ordered: Walker Wheels () and Walker () Treatment Diagnosis: Difficulty ambulating       oxyCODONE 5 MG IR tablet    ROXICODONE    60 tablet    Take 1-2 tablets (5-10 mg) by mouth every 3 hours as needed for moderate to severe pain       SYNTHROID PO      Take 175 mcg by mouth daily       TRAZODONE HCL PO      Take 50 mg by mouth nightly as needed for sleep       ZOLOFT PO      Take 50 mg by mouth daily

## 2017-06-13 ENCOUNTER — THERAPY VISIT (OUTPATIENT)
Dept: PHYSICAL THERAPY | Facility: CLINIC | Age: 57
End: 2017-06-13
Payer: COMMERCIAL

## 2017-06-13 DIAGNOSIS — M25.571 RIGHT ANKLE PAIN, UNSPECIFIED CHRONICITY: ICD-10-CM

## 2017-06-13 DIAGNOSIS — Z98.1 ARTHRODESIS STATUS: ICD-10-CM

## 2017-06-13 PROCEDURE — 97110 THERAPEUTIC EXERCISES: CPT | Mod: GP | Performed by: PHYSICAL THERAPIST

## 2017-06-13 PROCEDURE — 97140 MANUAL THERAPY 1/> REGIONS: CPT | Mod: GP | Performed by: PHYSICAL THERAPIST

## 2017-06-13 NOTE — PROGRESS NOTES
Subjective:    HPI                    Objective:    System    Physical Exam    General     ROS    Assessment/Plan:      SUBJECTIVE  Subjective changes as noted by pt:  Patient reports that she is doing well overall. She is using one crutch with full weight on the right and that is going well. She is taking occasional steps without assistive device.   Current Pain level: 1/10   Changes in function:  Yes (See Goal flowsheet attached for changes in current functional level)     Adverse reaction to treatment or activity:  None    OBJECTIVE  Changes in objective:  Supine right ankle active DF to 6 degrees and passive to 9 degrees. Right ankle PF strength 4-/5.Incisions healed and without scabs and improving mobility. Ambulating with once crutch FWB on R.         Patient's symptoms are resolving.  Patient is progressing as expected.  Response to therapy has shown an improvement in  pain level, ROM , strength, edema, gait and function  Progress made towards STG/LTG?  Yes (See Goal flowsheet attached for updates on achievement of STG and LTG)    PLAN  Current treatment program is being advanced to more complex exercises.    PTA/ATC plan:  N/A    Please refer to the daily flowsheet for treatment today, total treatment time and time spent performing 1:1 timed codes.

## 2017-06-15 ENCOUNTER — THERAPY VISIT (OUTPATIENT)
Dept: PHYSICAL THERAPY | Facility: CLINIC | Age: 57
End: 2017-06-15
Payer: COMMERCIAL

## 2017-06-15 DIAGNOSIS — M25.571 RIGHT ANKLE PAIN, UNSPECIFIED CHRONICITY: ICD-10-CM

## 2017-06-15 DIAGNOSIS — Z98.1 ARTHRODESIS STATUS: ICD-10-CM

## 2017-06-15 PROCEDURE — 97110 THERAPEUTIC EXERCISES: CPT | Mod: GP | Performed by: PHYSICAL THERAPIST

## 2017-06-15 PROCEDURE — 97140 MANUAL THERAPY 1/> REGIONS: CPT | Mod: GP | Performed by: PHYSICAL THERAPIST

## 2017-06-20 ENCOUNTER — THERAPY VISIT (OUTPATIENT)
Dept: PHYSICAL THERAPY | Facility: CLINIC | Age: 57
End: 2017-06-20
Payer: COMMERCIAL

## 2017-06-20 DIAGNOSIS — M25.571 RIGHT ANKLE PAIN, UNSPECIFIED CHRONICITY: ICD-10-CM

## 2017-06-20 DIAGNOSIS — Z98.1 ARTHRODESIS STATUS: ICD-10-CM

## 2017-06-20 PROCEDURE — 97110 THERAPEUTIC EXERCISES: CPT | Mod: GP | Performed by: PHYSICAL THERAPIST

## 2017-06-20 PROCEDURE — 97140 MANUAL THERAPY 1/> REGIONS: CPT | Mod: GP | Performed by: PHYSICAL THERAPIST

## 2017-06-22 ENCOUNTER — THERAPY VISIT (OUTPATIENT)
Dept: PHYSICAL THERAPY | Facility: CLINIC | Age: 57
End: 2017-06-22
Payer: COMMERCIAL

## 2017-06-22 DIAGNOSIS — M25.571 RIGHT ANKLE PAIN, UNSPECIFIED CHRONICITY: ICD-10-CM

## 2017-06-22 DIAGNOSIS — Z98.1 ARTHRODESIS STATUS: ICD-10-CM

## 2017-06-22 PROCEDURE — 97110 THERAPEUTIC EXERCISES: CPT | Mod: GP | Performed by: PHYSICAL THERAPIST

## 2017-06-22 PROCEDURE — 97140 MANUAL THERAPY 1/> REGIONS: CPT | Mod: GP | Performed by: PHYSICAL THERAPIST

## 2017-06-22 NOTE — MR AVS SNAPSHOT
After Visit Summary   6/22/2017    Shannon Beckwith    MRN: [id number]           Patient Information     Date Of Birth          1960        Visit Information        Provider Department      6/22/2017 9:10 AM Shirlene Scanlon, PT [facility]        Today's Diagnoses     Right ankle pain, unspecified chronicity        Arthrodesis status           Follow-ups after your visit        Your next 10 appointments already scheduled     Jun 27, 2017  9:10 AM CDT   JOHANA Extremity with Shirlene Scanlon PT   [facility] ([facility])    [address]  [facility] [address]   [phone]            Jun 29, 2017  8:30 AM CDT   JOHANA Extremity with Shirlene Scanlon PT   [facility] ([facility])    [address]  [facility] [address] [phone]              Who to contact     If you have questions or need follow up information about today's clinic visit or your schedule please contact [facility] PHYSICAL THERAPY directly at [phone].  Normal or non-critical lab and imaging results will be communicated to you by MyChart, letter or phone within 4 business days after the clinic has received the results. If you do not hear from us within 7 days, please contact the clinic through MyChart or phone. If you have a critical or abnormal lab result, we will notify you by phone as soon as possible.  Submit refill requests through [employer] or call your pharmacy and they will forward the refill request to us. Please allow 3 business days for your refill to be completed.          Additional Information About Your Visit        [employer] Information     [employer] lets you send messages to your doctor, view your test results, renew your prescriptions, schedule appointments and more. To sign up, go to  "[email or web address] . Click on \"Log in\" on the left side of the screen, which will take you to the Welcome page. Then click on \"Sign up Now\" on the right side of the page.     You will be asked to enter the access code listed below, as well as some personal information. Please follow the directions to create your username and password.     Your access code is: [email or web address]  Expires: 2017  9:55 AM     Your access code will  in 90 days. If you need help or a new code, please call your [place] clinic or [phone].        Care EveryWhere ID     This is your Care EveryWhere ID. This could be used by other organizations to access your [place] medical records  [id number]         Blood Pressure from Last 3 Encounters:   17 137/70   10/20/16 (!) 145/92   12 107/68    Weight from Last 3 Encounters:   17 88.5 kg (195 lb 3.2 oz)   10/20/16 93.3 kg (205 lb 11 oz)   16 94.9 kg (209 lb 3.2 oz)              We Performed the Following     JOHANA Progress Notes Report     Manual Ther Tech, 1+Regions, EA 15 min     Therapeutic Exercises        Primary Care Provider Office Phone # Fax #    Tejas Garibay [phone]       [address] [place] [address] [place] [address]        Equal Access to Services     KIMANI LUCIANO : Hadii aad ku hadasho Soscottali, waaxda luqadaha, qaybta kaalmada adeegyada, troy moody. So [facility] [phone].    ATENCIÓN: Si habla español, tiene a butts disposición servicios gratuitos de asistencia lingüística. Jesse al [phone].    We comply with applicable federal civil rights laws and [place] laws. We do not discriminate on the basis of race, color, national origin, age, disability sex, sexual orientation or gender identity.            Thank you!     Thank you for choosing [facility]  for your care. Our goal is always to provide you with excellent care. " Hearing back from our patients is one way we can continue to improve our services. Please take a few minutes to complete the written survey that you may receive in the mail after your visit with us. Thank you!             Your Updated Medication List - Protect others around you: Learn how to safely use, store and throw away your medicines at www.disposemymeds.org.          This list is accurate as of: 6/22/17 11:59 PM.  Always use your most recent med list.                   Brand Name Dispense Instructions for use Diagnosis    aspirin - buffered 325 MG Tabs tablet    ASCRIPTIN    360 each    Take 1 tablet (325 mg) by mouth daily    Ankle arthritis       CHLORTHALIDONE PO      Take 12.5 mg by mouth daily (0.5 x 25 mg tablet = 12.5 mg dose)        ibuprofen 600 MG tablet    ADVIL/MOTRIN    60 tablet    Take 1 tablet (600 mg) by mouth every 6 hours as needed for other (inflammatory pain)    Ankle arthritis       MULTI FOR HER PO      Take 1 tablet by mouth daily        order for DME     1 each    Equipment being ordered: Walker Wheels () and Walker () Treatment Diagnosis: Difficulty ambulating    Ankle arthritis       oxyCODONE 5 MG IR tablet    ROXICODONE    60 tablet    Take 1-2 tablets (5-10 mg) by mouth every 3 hours as needed for moderate to severe pain    Ankle arthritis       SYNTHROID PO      Take 175 mcg by mouth daily        TRAZODONE HCL PO      Take 50 mg by mouth nightly as needed for sleep        ZOLOFT PO      Take 50 mg by mouth daily

## 2017-06-22 NOTE — LETTER
[facility]  [address] [facility]  [place] [id number] [phone]    2017    Re: Shannon Beckwith   :   1960  MRN:  [id number]   REFERRING PHYSICIAN:   J Carlos Mcdonald    [facility]    Date of Initial Evaluation:  2017  Visits: 18 Rxs Used: 9  Reason for Referral:     Right ankle pain, unspecified chronicity  Arthrodesis status    PROGRESS  REPORT    Progress reporting period is from 17 to 17.       SUBJECTIVE  Subjective changes noted by patient:  Patient reports that she has improved by about 85% since starting therapy. Her left knee bothers her more than the right ankle now. She is anxious to return to work.        Current pain level is 1/10  .     Previous pain level was  7/10  .   Changes in function:  Yes (See Goal flowsheet attached for changes in current functional level)  Adverse reaction to treatment or activity: None    OBJECTIVE  Changes noted in objective findings:  Supine R ankle AROM: DF 7 degrees and PF 42 degrees. Passive DF R to 11. Patient ambulating most times without a cane, but does use one crutch when out and about at times. She was told to use the crutch on the right side to decrease L knee pain if helpful. Right strength: DF 4+ to 5-/5 and PF 4/5. Patient now able to clear R heel from floor when doing single leg raise, but not able to clear heel through full ROM of PF. Improving talocrural mobility, but still stiff. Scars mobile and healing well. Minimal  edema R ankle, but moderate throughout left knee. Hip abduction 4/5 strength.     ASSESSMENT/PLAN  Updated problem list and treatment plan: Diagnosis 1:  S/p R subtalar joint fusion Pain -  hot/cold therapy, manual therapy, self management, education, directional preference exercise and home program  Decreased ROM/flexibility - manual therapy, therapeutic exercise, therapeutic activity and  home program    Re: Shannon Beckwith   :   1960      Decreased joint mobility - manual therapy, therapeutic exercise and home program  Decreased strength - therapeutic exercise, therapeutic activities and home program  Impaired balance - neuro re-education, gait training, therapeutic activities, adaptive equipment/assistive device and home program  Decreased proprioception - neuro re-education, therapeutic activities and home program  Edema - cold therapy and self management/home program  Impaired gait - gait training and home program  Impaired muscle performance - neuro re-education and home program  Decreased function - therapeutic activities and home program  STG/LTGs have been met or progress has been made towards goals:  Yes (See Goal flow sheet completed today.)  Assessment of Progress: The patient's condition is improving.  The patient's condition has potential to improve.  Patient is meeting short term goals and is progressing towards long term goals.  Self Management Plans:  Patient has been instructed in a home treatment program.  Patient  has been instructed in self management of symptoms.  Shannon Rodriguez continues to require the following intervention to meet STG and LTG's:  PT    Recommendations:  This patient would benefit from continued therapy.     Frequency:  1 X week, once daily  Duration:  for 4 weeks    Thank you for your referral.        INQUIRIES  Therapist: Shirlene Scanlon, PT  [facility]  [address] [place] [address]. [address]  [facility] [address]  Phone: [phone]  Fax: [phone]

## 2017-06-22 NOTE — PROGRESS NOTES
Subjective:    HPI                    Objective:    System    Physical Exam    General     ROS    Assessment/Plan:      PROGRESS  REPORT    Progress reporting period is from 5-23-17 to 6-22-17.       SUBJECTIVE  Subjective changes noted by patient:  Patient reports that she has improved by about 85% since starting therapy. Her left knee bothers her more than the right ankle now. She is anxious to return to work.        Current pain level is 1/10  .     Previous pain level was  7/10  .   Changes in function:  Yes (See Goal flowsheet attached for changes in current functional level)  Adverse reaction to treatment or activity: None    OBJECTIVE  Changes noted in objective findings:  Supine R ankle AROM: DF 7 degrees and PF 42 degrees. Passive DF R to 11. Patient ambulating most times without a cane, but does use one crutch when out and about at times. She was told to use the crutch on the right side to decrease L knee pain if helpful. Right strength: DF 4+ to 5-/5 and PF 4/5. Patient now able to clear R heel from floor when doing single leg raise, but not able to clear heel through full ROM of PF. Improving talocrural mobility, but still stiff. Scars mobile and healing well. Minimal  edema R ankle, but moderate throughout left knee. Hip abduction 4/5 strength.     ASSESSMENT/PLAN  Updated problem list and treatment plan: Diagnosis 1:  S/p R subtalar joint fusion Pain -  hot/cold therapy, manual therapy, self management, education, directional preference exercise and home program  Decreased ROM/flexibility - manual therapy, therapeutic exercise, therapeutic activity and home program  Decreased joint mobility - manual therapy, therapeutic exercise and home program  Decreased strength - therapeutic exercise, therapeutic activities and home program  Impaired balance - neuro re-education, gait training, therapeutic activities, adaptive equipment/assistive device and home program  Decreased proprioception - neuro  re-education, therapeutic activities and home program  Edema - cold therapy and self management/home program  Impaired gait - gait training and home program  Impaired muscle performance - neuro re-education and home program  Decreased function - therapeutic activities and home program  STG/LTGs have been met or progress has been made towards goals:  Yes (See Goal flow sheet completed today.)  Assessment of Progress: The patient's condition is improving.  The patient's condition has potential to improve.  Patient is meeting short term goals and is progressing towards long term goals.  Self Management Plans:  Patient has been instructed in a home treatment program.  Patient  has been instructed in self management of symptoms.    Tanna continues to require the following intervention to meet STG and LTG's:  PT    Recommendations:  This patient would benefit from continued therapy.     Frequency:  1 X week, once daily  Duration:  for 4 weeks        Please refer to the daily flowsheet for treatment today, total treatment time and time spent performing 1:1 timed codes.

## 2017-06-28 ENCOUNTER — THERAPY VISIT (OUTPATIENT)
Dept: PHYSICAL THERAPY | Facility: CLINIC | Age: 57
End: 2017-06-28
Payer: COMMERCIAL

## 2017-06-28 DIAGNOSIS — Z98.1 ARTHRODESIS STATUS: ICD-10-CM

## 2017-06-28 DIAGNOSIS — M25.571 RIGHT ANKLE PAIN, UNSPECIFIED CHRONICITY: ICD-10-CM

## 2017-06-28 PROCEDURE — 97530 THERAPEUTIC ACTIVITIES: CPT | Mod: GP | Performed by: PHYSICAL THERAPIST

## 2017-06-28 PROCEDURE — 97140 MANUAL THERAPY 1/> REGIONS: CPT | Mod: GP | Performed by: PHYSICAL THERAPIST

## 2017-06-28 PROCEDURE — 97110 THERAPEUTIC EXERCISES: CPT | Mod: GP | Performed by: PHYSICAL THERAPIST

## 2017-07-03 ENCOUNTER — THERAPY VISIT (OUTPATIENT)
Dept: PHYSICAL THERAPY | Facility: CLINIC | Age: 57
End: 2017-07-03
Payer: COMMERCIAL

## 2017-07-03 DIAGNOSIS — M25.571 RIGHT ANKLE PAIN, UNSPECIFIED CHRONICITY: ICD-10-CM

## 2017-07-03 DIAGNOSIS — Z98.1 ARTHRODESIS STATUS: ICD-10-CM

## 2017-07-03 PROCEDURE — 97110 THERAPEUTIC EXERCISES: CPT | Mod: GP | Performed by: PHYSICAL THERAPIST

## 2017-07-03 PROCEDURE — 97140 MANUAL THERAPY 1/> REGIONS: CPT | Mod: GP | Performed by: PHYSICAL THERAPIST

## 2017-07-06 ENCOUNTER — THERAPY VISIT (OUTPATIENT)
Dept: PHYSICAL THERAPY | Facility: CLINIC | Age: 57
End: 2017-07-06
Payer: COMMERCIAL

## 2017-07-06 DIAGNOSIS — Z98.1 ARTHRODESIS STATUS: ICD-10-CM

## 2017-07-06 DIAGNOSIS — M25.571 RIGHT ANKLE PAIN, UNSPECIFIED CHRONICITY: ICD-10-CM

## 2017-07-06 PROCEDURE — 97110 THERAPEUTIC EXERCISES: CPT | Mod: GP | Performed by: PHYSICAL THERAPIST

## 2017-07-06 PROCEDURE — 97140 MANUAL THERAPY 1/> REGIONS: CPT | Mod: GP | Performed by: PHYSICAL THERAPIST

## 2017-07-06 NOTE — MR AVS SNAPSHOT
After Visit Summary   7/6/2017    Shannon Beckwith    MRN: [id number]           Patient Information     Date Of Birth          1960        Visit Information        Provider Department      7/6/2017 8:20 AM Natalia Dias, PT [facility]        Today's Diagnoses     Right ankle pain, unspecified chronicity        Arthrodesis status           Follow-ups after your visit        Your next 10 appointments already scheduled     Jul 11, 2017  8:20 AM CDT   JOHANA Extremity with Natalia Dias PT   [facility] ([facility])    [address]  [facility] [id number] [phone]            Jul 14, 2017  8:20 AM CDT   JOHANA Extremity with Natalia Dais PT   [facility] ([facility])    [address]  [facility] [address] [phone]              Who to contact     If you have questions or need follow up information about today's clinic visit or your schedule please contact [facility] PHYSICAL THERAPY directly at [phone].  Normal or non-critical lab and imaging results will be communicated to you by MyChart, letter or phone within 4 business days after the clinic has received the results. If you do not hear from us within 7 days, please contact the clinic through [employer] or phone. If you have a critical or abnormal lab result, we will notify you by phone as soon as possible.  Submit refill requests through [employer] or call your pharmacy and they will forward the refill request to us. Please allow 3 business days for your refill to be completed.          Additional Information About Your Visit        MyChart Information     [employer] lets you send messages to your doctor, view your test results, renew your prescriptions, schedule appointments and more. To sign up, go to  "[email or web address] . Click on \"Log in\" on the left side of the screen, which will take you to the Welcome page. Then click on \"Sign up Now\" on the right side of the page.     You will be asked to enter the access code listed below, as well as some personal information. Please follow the directions to create your username and password.     Your access code is: [email or web address]  Expires: 10/1/2017  9:06 AM     Your access code will  in 90 days. If you need help or a new code, please call your [place] clinic or [phone].        Care EveryWhere ID     This is your Care EveryWhere ID. This could be used by other organizations to access your [place] medical records  [id number]         Blood Pressure from Last 3 Encounters:   17 137/70   10/20/16 (!) 145/92   12 107/68    Weight from Last 3 Encounters:   17 88.5 kg (195 lb 3.2 oz)   10/20/16 93.3 kg (205 lb 11 oz)   16 94.9 kg (209 lb 3.2 oz)              We Performed the Following     MANUAL THER TECH,1+REGIONS,EA 15 MIN     THERAPEUTIC EXERCISES        Primary Care Provider Office Phone # Fax #    Tejas Garibay [phone]       [address] [place]  [facility] [address]        Equal Access to Services     [facility] : Hadii jerrell sr hadasho Soalyssa, waaxda luqadaha, qaybta kaalmada didieryalake, troy moody. So [facility] [phone].    ATENCIÓN: Si habla español, tiene a butts disposición servicios gratuitos de asistencia lingüística. Jesse al [phone].    We comply with applicable federal civil rights laws and [place] laws. We do not discriminate on the basis of race, color, national origin, age, disability sex, sexual orientation or gender identity.            Thank you!     Thank you for choosing [facility] PHYSICAL THERAP[facility]  for your care. Our goal is always to provide you with excellent care. Hearing back from our patients is " one way we can continue to improve our services. Please take a few minutes to complete the written survey that you may receive in the mail after your visit with us. Thank you!             Your Updated Medication List - Protect others around you: Learn how to safely use, store and throw away your medicines at www.disposemymeds.org.          This list is accurate as of: 7/6/17  9:10 AM.  Always use your most recent med list.                   Brand Name Dispense Instructions for use Diagnosis    aspirin - buffered 325 MG Tabs tablet    ASCRIPTIN    360 each    Take 1 tablet (325 mg) by mouth daily    Ankle arthritis       CHLORTHALIDONE PO      Take 12.5 mg by mouth daily (0.5 x 25 mg tablet = 12.5 mg dose)        ibuprofen 600 MG tablet    ADVIL/MOTRIN    60 tablet    Take 1 tablet (600 mg) by mouth every 6 hours as needed for other (inflammatory pain)    Ankle arthritis       MULTI FOR HER PO      Take 1 tablet by mouth daily        order for DME     1 each    Equipment being ordered: Walker Wheels () and Walker () Treatment Diagnosis: Difficulty ambulating    Ankle arthritis       oxyCODONE 5 MG IR tablet    ROXICODONE    60 tablet    Take 1-2 tablets (5-10 mg) by mouth every 3 hours as needed for moderate to severe pain    Ankle arthritis       SYNTHROID PO      Take 175 mcg by mouth daily        TRAZODONE HCL PO      Take 50 mg by mouth nightly as needed for sleep        ZOLOFT PO      Take 50 mg by mouth daily

## 2017-10-02 PROBLEM — Z98.1 ARTHRODESIS STATUS: Status: RESOLVED | Noted: 2017-05-23 | Resolved: 2017-10-02

## 2017-10-02 PROBLEM — M25.571 ANKLE PAIN, RIGHT: Status: RESOLVED | Noted: 2017-05-23 | Resolved: 2017-10-02

## 2017-10-02 NOTE — PROGRESS NOTES
Pt did not return to PT for additional follow-up visits.  Current status is unknown.  Discharge at this time.

## 2017-10-24 NOTE — PROGRESS NOTES
INSTRUCTIONS:  1. Go to the Lab in [address] to get blood tests today.   2. Continue to take Lamictal/lamotrigine 250mg twice a day and Keppra/levetiracetam 1250 mg twice a day.  3. 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  4. Come back in 6 months.       Thank you for choosing the [place] [facility], Lenka Alonzo MD, and our team as your Neurology Specialists.  We actively use feedback to constantly improve and deliver the best care possible. To provide the best experience, we are collecting feedback from you on how we performed.  You may receive a survey in the mail to evaluate how we did. Please take a moment and share your thoughts.      If for any reason you feel that we did not meet your expectations or you want to share a positive experience, please give us a call. Your feedback helps us know how we are doing and what we can be doing better.    Office hours: 8:00 am to 4:30 pm, Monday - Friday  Phone: [phone]     Pod 1    Date of Service: March 31, 2017    Afeb, n/v intact  Reviewed findings at surg  Plan: Physical therapy, home with dressing changes, instructed

## 2020-09-28 ENCOUNTER — HOSPITAL ENCOUNTER (EMERGENCY)
Facility: CLINIC | Age: 60
Discharge: HOME | End: 2020-09-28
Attending: EMERGENCY MEDICINE | Admitting: EMERGENCY MEDICINE
Payer: COMMERCIAL

## 2020-09-28 VITALS
TEMPERATURE: 97.5 F | OXYGEN SATURATION: 98 % | DIASTOLIC BLOOD PRESSURE: 96 MMHG | SYSTOLIC BLOOD PRESSURE: 162 MMHG | HEART RATE: 91 BPM

## 2020-09-28 DIAGNOSIS — F10.220 ACUTE ALCOHOLIC INTOXICATION IN ALCOHOLISM WITHOUT COMPLICATION (H): ICD-10-CM

## 2020-09-28 LAB — ALCOHOL BREATH TEST: 0.06 (ref 0–0.01)

## 2020-09-28 PROCEDURE — 99283 EMERGENCY DEPT VISIT LOW MDM: CPT | Performed by: EMERGENCY MEDICINE

## 2020-09-28 PROCEDURE — 82075 ASSAY OF BREATH ETHANOL: CPT | Performed by: EMERGENCY MEDICINE

## 2020-09-28 PROCEDURE — 99284 EMERGENCY DEPT VISIT MOD MDM: CPT | Mod: Z6 | Performed by: EMERGENCY MEDICINE

## 2020-09-28 PROCEDURE — 25000128 H RX IP 250 OP 636: Performed by: EMERGENCY MEDICINE

## 2020-09-28 RX ORDER — DIAZEPAM 10 MG
TABLET ORAL
Qty: 11 TABLET | Refills: 0 | Status: ON HOLD | OUTPATIENT
Start: 2020-09-28 | End: 2021-03-02

## 2020-09-28 RX ORDER — ONDANSETRON 4 MG/1
4 TABLET, ORALLY DISINTEGRATING ORAL ONCE
Status: COMPLETED | OUTPATIENT
Start: 2020-09-28 | End: 2020-09-28

## 2020-09-28 RX ORDER — ONDANSETRON 4 MG/1
4 TABLET, ORALLY DISINTEGRATING ORAL EVERY 8 HOURS PRN
Qty: 20 TABLET | Refills: 0 | Status: SHIPPED | OUTPATIENT
Start: 2020-09-28 | End: 2020-10-01

## 2020-09-28 RX ADMIN — ONDANSETRON 4 MG: 4 TABLET, ORALLY DISINTEGRATING ORAL at 17:35

## 2020-09-28 NOTE — ED AVS SNAPSHOT
[facility], Emergency Department  [address]  [facility] [address]  Phone:  [phone]  Fax:  [phone]                                    Shannon Beckwith   MRN: [id number]    Department:  [facility], Emergency Department   Date of Visit:  9/28/2020           After Visit Summary Signature Page    I have received my discharge instructions, and my questions have been answered. I have discussed any challenges I see with this plan with the nurse or doctor.    ..........................................................................................................................................  Patient/Patient Representative Signature      ..........................................................................................................................................  Patient Representative Print Name and Relationship to Patient    ..................................................               ................................................  Date                                   Time    ..........................................................................................................................................  Reviewed by Signature/Title    ...................................................              ..............................................  Date                                               Time          22EPIC Rev 08/18

## 2020-09-28 NOTE — DISCHARGE INSTRUCTIONS
TODAY'S VISIT:  You were seen today for alcohol intoxication, dependence  -   - If you had any labs or imaging/radiology tests performed today, you should also discuss these tests with your usual provider.     FOLLOW-UP:  Please make an appointment to follow up with:  - Your Primary Care Provider. If you do not have a PCP, please call the Primary Care Center (phone: [phone] for an appointment    - Have your provider review the results from today's visit with you again to make sure no further follow-up or additional testing is needed based on those results.     PRESCRIPTIONS / MEDICATIONS:  - zofran  - valium    OTHER INSTRUCTIONS:  - make sure to stay well hydrated    RETURN TO THE EMERGENCY DEPARTMENT  Return to the Emergency Department at any time for any new or worsening symptoms or any concerns.

## 2020-09-28 NOTE — ED PROVIDER NOTES
History     Chief Complaint   Patient presents with     Alcohol Problem     Pt seeking detox from alcohol, last drink 0600, no seizure hx      HPI  Shannon Beckwith is a 60 year old female with a past medical history of alcohol dependency, arthritis, depression, hypertension, thyroid disease who presents to the emergency department with a chief complaint of alcohol intoxication.  The patient states that she is seeking detox from alcohol.  Her last drink was at 6 AM.  She states that she has been drinking for the last several days.  Today, she reports that she drank most of a fifth of hard liquor.  The patient denies any alcohol withdrawal seizure or delirium tremens history.  The patient reports that she feels nauseated, denies any other physical symptoms at this time.  She is concerned about withdrawal symptoms while she detoxes off of alcohol.  She is particularly concerned because her blood pressure has become significantly elevated in the past and she does not want this to happen again because of her age.  The patient reports that she is currently going through divorce with her .    I have reviewed the Medications, Allergies, Past Medical and Surgical History, and Social History in the [facility] system.    Past Medical History:   Diagnosis Date     Arthritis      Chemical dependency (H) sober since July 2016 per PCP, multiple prior inpatient hospital stays for EtOH abuse     Depression      Dyspnea on exertion      History of blood transfusion      Hypertension pre hypertensive     Obese     eating disorder     Problem, psychiatric     Alcohlic     Sleep apnea 2000    Not a problem since weight loss surgery      Thyroid disease      Tracheal stenosis      Past Surgical History:   Procedure Laterality Date     ABDOMEN SURGERY      gastric bypass, elaine-en-y     APPENDECTOMY       ARTHRODESIS FOOT Right 3/30/2017    Procedure: ARTHRODESIS FOOT;  Surgeon: J Carlos Mcdonald MD;  Location:  OR     BRONCHOSCOPY  RIGID  2/14/2012    Procedure:BRONCHOSCOPY RIGID; Surgeon:TONNY PENA; Location:[facility] OR     GRAFT BONE FROM ILIAC CREST Right 3/30/2017    Procedure: GRAFT BONE FROM ILIAC CREST;  Surgeon: J Carlos Mcdonald MD;  Location: [facility] OR     INJECT STEROID (LOCATION) N/A 10/20/2016    Procedure: INJECT STEROID (LOCATION);  Surgeon: Aylin Del Rio MD;  Location: [facility] OR     LARYNGOSCOPY, ESOPHAGOSCOPY WITH DILATION, COMBINED  2/14/2012    Procedure:COMBINED LARYNGOSCOPY, ESOPHAGOSCOPY WITH DILATION; Direct Laryngoscopy, Rigid Bronchoscopy, Balloon Dilation of Subglottic Stenosis  **latex safe; Surgeon:TONNY PENA; Location:[facility] OR     LASER CO2 LARYNGOSCOPY, COMPLEX  11/21/2012    Procedure: LASER CO2 LARYNGOSCOPY, COMPLEX;  Microdirect Laryngoscopy, Incision and Dilation,  CO2 Laser, Kenalog Injection;  Surgeon: Aylin Del Rio MD;  Location: [facility] OR     LASER CO2 LARYNGOSCOPY, COMPLEX N/A 10/20/2016    Procedure: LASER CO2 LARYNGOSCOPY, COMPLEX;  Surgeon: Aylin Del Rio MD;  Location: [facility] OR     subglottic dilatation       wisdom teeth removed[       No current facility-administered medications for this encounter.      Current Outpatient Medications   Medication     aspirin - buffered (ASCRIPTIN) 325 MG TABS tablet     CHLORTHALIDONE PO     ibuprofen (ADVIL/MOTRIN) 600 MG tablet     Levothyroxine Sodium (SYNTHROID PO)     Multiple Vitamins-Minerals (MULTI FOR HER PO)     order for DME     oxyCODONE (ROXICODONE) 5 MG IR tablet     Sertraline HCl (ZOLOFT PO)     TRAZODONE HCL PO     Allergies   Allergen Reactions     Nkda [No Known Drug Allergies]      Past medical history, past surgical history, medications, and allergies were reviewed with the patient. Additional pertinent items: None    Social History     Socioeconomic History     Marital status:      Spouse name: Not on file     Number of children: Not on file     Years of education: Not on file     Highest education  level: Not on file   Occupational History     Not on file   Social Needs     Financial resource strain: Not on file     Food insecurity     Worry: Not on file     Inability: Not on file     Transportation needs     Medical: Not on file     Non-medical: Not on file   Tobacco Use     Smoking status: Former Smoker     Years: 5.00     Types: Cigarettes     Start date: 1976     Last attempt to quit: 1980     Years since quittin.6   Substance and Sexual Activity     Alcohol use: No     Comment: since 2016     Drug use: No     Sexual activity: Not Currently     Partners: Male     Birth control/protection: Post-menopausal   Lifestyle     Physical activity     Days per week: Not on file     Minutes per session: Not on file     Stress: Not on file   Relationships     Social connections     Talks on phone: Not on file     Gets together: Not on file     Attends [employer] service: Not on file     Active member of club or organization: Not on file     Attends meetings of clubs or organizations: Not on file     Relationship status: Not on file     Intimate partner violence     Fear of current or ex partner: Not on file     Emotionally abused: Not on file     Physically abused: Not on file     Forced sexual activity: Not on file   Other Topics Concern     Not on file   Social History Narrative     Not on file     Social history was reviewed with the patient. Additional pertinent items: None    Review of Systems  General: No fevers or chills  Skin: No rash or diaphoresis  Eyes: No eye redness or discharge  Ears/Nose/Throat: No rhinorrhea or nasal congestion  Respiratory: No cough or SOB  Cardiovascular: No chest pain or palpitations  Gastrointestinal: Positive for nausea without vomiting  Genitourinary: No urinary frequency, hematuria, or dysuria  Musculoskeletal: No arthralgias or myalgias  Neurologic: No numbness or weakness  Psychiatric: Positive for alcohol abuse and  intoxication  Hematologic/Lymphatic/Immunologic: No leg swelling, no easy bruising/bleeding  Endocrine: No polyuria/polydypsia    A complete review of systems was performed with pertinent positives and negatives noted in the HPI, and all other systems negative.    Physical Exam   BP: (!) 146/107  Pulse: 95  Temp: 97  F (36.1  C)  SpO2: 98 %      General: Well nourished, well developed, NAD.  HEENT: EOMI, anicteric. NCAT, MMM  Neck: no jugular venous distension, supple, nl ROM  Cardiac: Regular rate and rhythm.  Intact peripheral pulses  Pulm: Airway patent, nonlabored breathing  Skin: Warm and dry to the touch.  No rash  Extremities: No LE edema, no cyanosis, w/w/p  Neuro: A&Ox3, no gross focal deficits  Psych: Patient appears anxious    ED Course        Procedures                           Labs Ordered and Resulted from Time of ED Arrival Up to the Time of Departure from the ED   ALCOHOL BREATH TEST POCT - Abnormal; Notable for the following components:       Result Value    Alcohol Breath Test 0.055 (*)     All other components within normal limits   DRUG ABUSE SCREEN 6 CHEM DEP URINE ([facility])            Results for orders placed or performed during the hospital encounter of 09/28/20 (from the past 24 hour(s))   Alcohol breath test POCT   Result Value Ref Range    Alcohol Breath Test 0.055 (A) 0.00 - 0.01       Labs, vital signs, and imaging studies were reviewed by me.    Medications   ondansetron (ZOFRAN-ODT) ODT tab 4 mg (has no administration in time range)       Assessments & Plan (with Medical Decision Making)   TannaJennifer Beckwith is a 60 year old female who presents the emergency department seeking detox from alcohol.  There are no detox beds available at our facility or [facility] at this time.  The patient states she is not interested in detox placement at other facilities such as [address] [place] [address] or [facility].  However, the patient is worried about withdrawal symptoms, such as nausea and hypertension.   Patient reports she feels nauseated here in the emergency department.  Zofran was given to help alleviate her symptoms.  Alcohol level is 0.055 on arrival to the ER.    Patient states she does have family/friends who can stay with her as she detoxes from alcohol.  She also has contact information for inpatient alcohol treatment programs.  Discussed with patient the option of discharging her home on a short benzodiazepine taper to help manage her symptoms as well as some Zofran to help manage nausea.  Emphasized the importance of not combining benzodiazepines with alcohol or other drugs or taking more than is prescribed.  The patient expresses understanding and feels that she will be able to manage this at home.  Patient is also advised that she may return to the emergency department at any time if her symptoms become unmanageable or she feels that she cannot handle the benzodiazepine taper on her own.    I have reviewed the nursing notes.    I have reviewed the findings, diagnosis, plan and need for follow up with the patient.    Patient to be discharged home. Advised to follow up with PCP and alcohol treatment program as planned. To return to ER immediately with any new/worsening symptoms. Plan of care discussed with patient who expresses understanding and agrees with plan of care.      New Prescriptions    No medications on file       Final diagnoses:   Acute alcoholic intoxication in alcoholism without complication (H)       9/28/2020   [facility], [place], EMERGENCY DEPARTMENT     Jo Ann Rahman MD  09/28/20 1966

## 2021-03-01 ENCOUNTER — APPOINTMENT (OUTPATIENT)
Dept: CT IMAGING | Facility: CLINIC | Age: 61
End: 2021-03-01
Attending: EMERGENCY MEDICINE
Payer: COMMERCIAL

## 2021-03-01 ENCOUNTER — HOSPITAL ENCOUNTER (INPATIENT)
Facility: CLINIC | Age: 61
LOS: 3 days | Discharge: HOME | End: 2021-03-04
Attending: EMERGENCY MEDICINE | Admitting: INTERNAL MEDICINE
Payer: COMMERCIAL

## 2021-03-01 DIAGNOSIS — Z11.52 ENCOUNTER FOR SCREENING LABORATORY TESTING FOR SEVERE ACUTE RESPIRATORY SYNDROME CORONAVIRUS 2 (SARS-COV-2): ICD-10-CM

## 2021-03-01 DIAGNOSIS — R51.9 FACIAL PAIN: ICD-10-CM

## 2021-03-01 DIAGNOSIS — E87.1 HYPONATREMIA: ICD-10-CM

## 2021-03-01 DIAGNOSIS — E87.29 ALCOHOLIC KETOACIDOSIS: ICD-10-CM

## 2021-03-01 DIAGNOSIS — R79.89 LACTATE BLOOD INCREASE: ICD-10-CM

## 2021-03-01 LAB
ALBUMIN SERPL-MCNC: 4 G/DL (ref 3.4–5)
ALCOHOL BREATH TEST: 0.28 (ref 0–0.01)
ALP SERPL-CCNC: 126 U/L (ref 40–150)
ALT SERPL W P-5'-P-CCNC: 32 U/L (ref 0–50)
AMPHETAMINES UR QL SCN: NEGATIVE
ANION GAP SERPL CALCULATED.3IONS-SCNC: 23 MMOL/L (ref 3–14)
AST SERPL W P-5'-P-CCNC: 52 U/L (ref 0–45)
BARBITURATES UR QL: NEGATIVE
BASOPHILS # BLD AUTO: 0.1 10E9/L (ref 0–0.2)
BASOPHILS NFR BLD AUTO: 1.2 %
BENZODIAZ UR QL: NEGATIVE
BILIRUB SERPL-MCNC: 0.7 MG/DL (ref 0.2–1.3)
BUN SERPL-MCNC: 11 MG/DL (ref 7–30)
CALCIUM SERPL-MCNC: 8 MG/DL (ref 8.5–10.1)
CANNABINOIDS UR QL SCN: NEGATIVE
CHLORIDE SERPL-SCNC: 91 MMOL/L (ref 94–109)
CO2 SERPL-SCNC: 16 MMOL/L (ref 20–32)
COCAINE UR QL: NEGATIVE
CREAT SERPL-MCNC: 0.65 MG/DL (ref 0.52–1.04)
DIFFERENTIAL METHOD BLD: ABNORMAL
EOSINOPHIL # BLD AUTO: 0.1 10E9/L (ref 0–0.7)
EOSINOPHIL NFR BLD AUTO: 0.5 %
ERYTHROCYTE [DISTWIDTH] IN BLOOD BY AUTOMATED COUNT: 19.3 % (ref 10–15)
ETHANOL UR QL SCN: POSITIVE
GFR SERPL CREATININE-BSD FRML MDRD: >90 ML/MIN/{1.73_M2}
GLUCOSE SERPL-MCNC: 79 MG/DL (ref 70–99)
HCT VFR BLD AUTO: 36.5 % (ref 35–47)
HGB BLD-MCNC: 12 G/DL (ref 11.7–15.7)
IMM GRANULOCYTES # BLD: 0 10E9/L (ref 0–0.4)
IMM GRANULOCYTES NFR BLD: 0.2 %
KETONES BLD-SCNC: 3.7 MMOL/L (ref 0–0.6)
LABORATORY COMMENT REPORT: NORMAL
LACTATE BLD-SCNC: 7.5 MMOL/L (ref 0.7–2)
LYMPHOCYTES # BLD AUTO: 1.3 10E9/L (ref 0.8–5.3)
LYMPHOCYTES NFR BLD AUTO: 13.5 %
MAGNESIUM SERPL-MCNC: 1.7 MG/DL (ref 1.6–2.3)
MCH RBC QN AUTO: 27.3 PG (ref 26.5–33)
MCHC RBC AUTO-ENTMCNC: 32.9 G/DL (ref 31.5–36.5)
MCV RBC AUTO: 83 FL (ref 78–100)
MONOCYTES # BLD AUTO: 0.7 10E9/L (ref 0–1.3)
MONOCYTES NFR BLD AUTO: 7.3 %
NEUTROPHILS # BLD AUTO: 7.2 10E9/L (ref 1.6–8.3)
NEUTROPHILS NFR BLD AUTO: 77.3 %
NRBC # BLD AUTO: 0 10*3/UL
NRBC BLD AUTO-RTO: 0 /100
OPIATES UR QL SCN: NEGATIVE
PLATELET # BLD AUTO: 391 10E9/L (ref 150–450)
POTASSIUM SERPL-SCNC: 4.6 MMOL/L (ref 3.4–5.3)
PROT SERPL-MCNC: 7.3 G/DL (ref 6.8–8.8)
RBC # BLD AUTO: 4.39 10E12/L (ref 3.8–5.2)
SARS-COV-2 RNA RESP QL NAA+PROBE: NEGATIVE
SODIUM SERPL-SCNC: 130 MMOL/L (ref 133–144)
SPECIMEN SOURCE: NORMAL
WBC # BLD AUTO: 9.3 10E9/L (ref 4–11)

## 2021-03-01 PROCEDURE — 83605 ASSAY OF LACTIC ACID: CPT | Performed by: EMERGENCY MEDICINE

## 2021-03-01 PROCEDURE — 96375 TX/PRO/DX INJ NEW DRUG ADDON: CPT | Performed by: EMERGENCY MEDICINE

## 2021-03-01 PROCEDURE — 96366 THER/PROPH/DIAG IV INF ADDON: CPT | Performed by: EMERGENCY MEDICINE

## 2021-03-01 PROCEDURE — 80320 DRUG SCREEN QUANTALCOHOLS: CPT | Performed by: EMERGENCY MEDICINE

## 2021-03-01 PROCEDURE — 250N000011 HC RX IP 250 OP 636: Performed by: EMERGENCY MEDICINE

## 2021-03-01 PROCEDURE — 85025 COMPLETE CBC W/AUTO DIFF WBC: CPT | Performed by: EMERGENCY MEDICINE

## 2021-03-01 PROCEDURE — 99285 EMERGENCY DEPT VISIT HI MDM: CPT | Performed by: EMERGENCY MEDICINE

## 2021-03-01 PROCEDURE — 258N000003 HC RX IP 258 OP 636: Performed by: EMERGENCY MEDICINE

## 2021-03-01 PROCEDURE — 250N000013 HC RX MED GY IP 250 OP 250 PS 637: Performed by: EMERGENCY MEDICINE

## 2021-03-01 PROCEDURE — 120N000002 HC R&B MED SURG/OB UMMC

## 2021-03-01 PROCEDURE — C9803 HOPD COVID-19 SPEC COLLECT: HCPCS | Performed by: EMERGENCY MEDICINE

## 2021-03-01 PROCEDURE — 70450 CT HEAD/BRAIN W/O DYE: CPT

## 2021-03-01 PROCEDURE — 82010 KETONE BODYS QUAN: CPT | Performed by: EMERGENCY MEDICINE

## 2021-03-01 PROCEDURE — 87635 SARS-COV-2 COVID-19 AMP PRB: CPT | Performed by: EMERGENCY MEDICINE

## 2021-03-01 PROCEDURE — 96361 HYDRATE IV INFUSION ADD-ON: CPT | Performed by: EMERGENCY MEDICINE

## 2021-03-01 PROCEDURE — 96365 THER/PROPH/DIAG IV INF INIT: CPT | Performed by: EMERGENCY MEDICINE

## 2021-03-01 PROCEDURE — 99285 EMERGENCY DEPT VISIT HI MDM: CPT | Mod: 25 | Performed by: EMERGENCY MEDICINE

## 2021-03-01 PROCEDURE — 80307 DRUG TEST PRSMV CHEM ANLYZR: CPT | Performed by: EMERGENCY MEDICINE

## 2021-03-01 PROCEDURE — 80053 COMPREHEN METABOLIC PANEL: CPT | Performed by: EMERGENCY MEDICINE

## 2021-03-01 PROCEDURE — 82075 ASSAY OF BREATH ETHANOL: CPT | Performed by: EMERGENCY MEDICINE

## 2021-03-01 PROCEDURE — 96376 TX/PRO/DX INJ SAME DRUG ADON: CPT | Performed by: EMERGENCY MEDICINE

## 2021-03-01 PROCEDURE — 250N000009 HC RX 250: Performed by: EMERGENCY MEDICINE

## 2021-03-01 PROCEDURE — 83735 ASSAY OF MAGNESIUM: CPT | Performed by: EMERGENCY MEDICINE

## 2021-03-01 RX ORDER — ONDANSETRON 2 MG/ML
4 INJECTION INTRAMUSCULAR; INTRAVENOUS ONCE
Status: COMPLETED | OUTPATIENT
Start: 2021-03-01 | End: 2021-03-01

## 2021-03-01 RX ORDER — ACETAMINOPHEN 325 MG/1
650 TABLET ORAL EVERY 4 HOURS PRN
Status: DISCONTINUED | OUTPATIENT
Start: 2021-03-01 | End: 2021-03-02

## 2021-03-01 RX ORDER — DIAZEPAM 5 MG
5-20 TABLET ORAL EVERY 30 MIN PRN
Status: DISCONTINUED | OUTPATIENT
Start: 2021-03-01 | End: 2021-03-02

## 2021-03-01 RX ADMIN — ACETAMINOPHEN 650 MG: 325 TABLET, FILM COATED ORAL at 23:02

## 2021-03-01 RX ADMIN — ONDANSETRON 4 MG: 2 INJECTION INTRAMUSCULAR; INTRAVENOUS at 21:09

## 2021-03-01 RX ADMIN — ONDANSETRON 4 MG: 2 INJECTION INTRAMUSCULAR; INTRAVENOUS at 22:52

## 2021-03-01 RX ADMIN — SODIUM CHLORIDE 1000 ML: 9 INJECTION, SOLUTION INTRAVENOUS at 21:09

## 2021-03-01 RX ADMIN — FOLIC ACID: 5 INJECTION, SOLUTION INTRAMUSCULAR; INTRAVENOUS; SUBCUTANEOUS at 22:44

## 2021-03-01 ASSESSMENT — ENCOUNTER SYMPTOMS
CONFUSION: 0
HEADACHES: 0
DIFFICULTY URINATING: 0
NECK STIFFNESS: 0
COLOR CHANGE: 0
SHORTNESS OF BREATH: 0
EYE REDNESS: 0
ARTHRALGIAS: 0
FEVER: 0
ABDOMINAL PAIN: 0

## 2021-03-02 LAB
ALBUMIN SERPL-MCNC: 3.3 G/DL (ref 3.4–5)
ALP SERPL-CCNC: 104 U/L (ref 40–150)
ALT SERPL W P-5'-P-CCNC: 34 U/L (ref 0–50)
ANION GAP SERPL CALCULATED.3IONS-SCNC: 8 MMOL/L (ref 3–14)
AST SERPL W P-5'-P-CCNC: 63 U/L (ref 0–45)
BILIRUB SERPL-MCNC: 0.9 MG/DL (ref 0.2–1.3)
BUN SERPL-MCNC: 7 MG/DL (ref 7–30)
CALCIUM SERPL-MCNC: 7.8 MG/DL (ref 8.5–10.1)
CHLORIDE SERPL-SCNC: 105 MMOL/L (ref 94–109)
CO2 SERPL-SCNC: 26 MMOL/L (ref 20–32)
CREAT SERPL-MCNC: 0.5 MG/DL (ref 0.52–1.04)
GFR SERPL CREATININE-BSD FRML MDRD: >90 ML/MIN/{1.73_M2}
GLUCOSE SERPL-MCNC: 106 MG/DL (ref 70–99)
LACTATE BLD-SCNC: 2.1 MMOL/L (ref 0.7–2)
LACTATE BLD-SCNC: 4 MMOL/L (ref 0.7–2)
POTASSIUM SERPL-SCNC: 4.4 MMOL/L (ref 3.4–5.3)
PROT SERPL-MCNC: 6 G/DL (ref 6.8–8.8)
SODIUM SERPL-SCNC: 139 MMOL/L (ref 133–144)

## 2021-03-02 PROCEDURE — 250N000011 HC RX IP 250 OP 636: Performed by: INTERNAL MEDICINE

## 2021-03-02 PROCEDURE — 258N000003 HC RX IP 258 OP 636: Performed by: INTERNAL MEDICINE

## 2021-03-02 PROCEDURE — 120N000002 HC R&B MED SURG/OB UMMC

## 2021-03-02 PROCEDURE — 80053 COMPREHEN METABOLIC PANEL: CPT | Performed by: INTERNAL MEDICINE

## 2021-03-02 PROCEDURE — HZ2ZZZZ DETOXIFICATION SERVICES FOR SUBSTANCE ABUSE TREATMENT: ICD-10-PCS | Performed by: HOSPITALIST

## 2021-03-02 PROCEDURE — 83605 ASSAY OF LACTIC ACID: CPT | Performed by: INTERNAL MEDICINE

## 2021-03-02 PROCEDURE — 99223 1ST HOSP IP/OBS HIGH 75: CPT | Mod: AI | Performed by: INTERNAL MEDICINE

## 2021-03-02 PROCEDURE — 250N000013 HC RX MED GY IP 250 OP 250 PS 637: Performed by: EMERGENCY MEDICINE

## 2021-03-02 PROCEDURE — 36415 COLL VENOUS BLD VENIPUNCTURE: CPT | Performed by: INTERNAL MEDICINE

## 2021-03-02 PROCEDURE — 258N000003 HC RX IP 258 OP 636: Performed by: EMERGENCY MEDICINE

## 2021-03-02 PROCEDURE — 96361 HYDRATE IV INFUSION ADD-ON: CPT | Performed by: EMERGENCY MEDICINE

## 2021-03-02 PROCEDURE — 83605 ASSAY OF LACTIC ACID: CPT | Performed by: EMERGENCY MEDICINE

## 2021-03-02 PROCEDURE — 99207 PR APP CREDIT; MD BILLING SHARED VISIT: CPT | Performed by: HOSPITALIST

## 2021-03-02 PROCEDURE — 250N000013 HC RX MED GY IP 250 OP 250 PS 637: Performed by: INTERNAL MEDICINE

## 2021-03-02 RX ORDER — ATENOLOL 50 MG/1
50 TABLET ORAL EVERY EVENING
Status: ON HOLD | COMMUNITY
End: 2024-03-09

## 2021-03-02 RX ORDER — ATENOLOL 25 MG/1
50 TABLET ORAL DAILY
Status: DISCONTINUED | OUTPATIENT
Start: 2021-03-02 | End: 2021-03-04 | Stop reason: HOSPADM

## 2021-03-02 RX ORDER — LIDOCAINE 40 MG/G
CREAM TOPICAL
Status: DISCONTINUED | OUTPATIENT
Start: 2021-03-02 | End: 2021-03-04 | Stop reason: HOSPADM

## 2021-03-02 RX ORDER — LEVOTHYROXINE SODIUM 200 UG/1
200 TABLET ORAL DAILY
COMMUNITY

## 2021-03-02 RX ORDER — LANOLIN ALCOHOL/MO/W.PET/CERES
100 CREAM (GRAM) TOPICAL DAILY
Status: DISCONTINUED | OUTPATIENT
Start: 2021-03-02 | End: 2021-03-04 | Stop reason: HOSPADM

## 2021-03-02 RX ORDER — DIAZEPAM 5 MG
5-20 TABLET ORAL EVERY 30 MIN PRN
Status: DISCONTINUED | OUTPATIENT
Start: 2021-03-02 | End: 2021-03-04 | Stop reason: HOSPADM

## 2021-03-02 RX ORDER — CALCIUM CARBONATE 500(1250)
500 TABLET ORAL 2 TIMES DAILY WITH MEALS
Status: COMPLETED | OUTPATIENT
Start: 2021-03-02 | End: 2021-03-02

## 2021-03-02 RX ORDER — FOLIC ACID 1 MG/1
1 TABLET ORAL DAILY
Status: DISCONTINUED | OUTPATIENT
Start: 2021-03-02 | End: 2021-03-04 | Stop reason: HOSPADM

## 2021-03-02 RX ORDER — AMLODIPINE BESYLATE 2.5 MG/1
2.5 TABLET ORAL AT BEDTIME
Status: DISCONTINUED | OUTPATIENT
Start: 2021-03-02 | End: 2021-03-04

## 2021-03-02 RX ORDER — AMLODIPINE BESYLATE 10 MG/1
10 TABLET ORAL EVERY MORNING
COMMUNITY

## 2021-03-02 RX ORDER — ONDANSETRON 4 MG/1
4 TABLET, ORALLY DISINTEGRATING ORAL EVERY 6 HOURS PRN
Status: DISCONTINUED | OUTPATIENT
Start: 2021-03-02 | End: 2021-03-04 | Stop reason: HOSPADM

## 2021-03-02 RX ORDER — ACETAMINOPHEN 325 MG/1
650 TABLET ORAL EVERY 4 HOURS PRN
Status: DISCONTINUED | OUTPATIENT
Start: 2021-03-02 | End: 2021-03-04 | Stop reason: HOSPADM

## 2021-03-02 RX ORDER — MULTIPLE VITAMINS W/ MINERALS TAB 9MG-400MCG
1 TAB ORAL DAILY
Status: DISCONTINUED | OUTPATIENT
Start: 2021-03-02 | End: 2021-03-04 | Stop reason: HOSPADM

## 2021-03-02 RX ADMIN — DIAZEPAM 5 MG: 5 TABLET ORAL at 06:36

## 2021-03-02 RX ADMIN — AMLODIPINE BESYLATE 2.5 MG: 2.5 TABLET ORAL at 22:20

## 2021-03-02 RX ADMIN — MULTIPLE VITAMINS W/ MINERALS TAB 1 TABLET: TAB at 08:09

## 2021-03-02 RX ADMIN — DIAZEPAM 5 MG: 5 TABLET ORAL at 08:06

## 2021-03-02 RX ADMIN — ACETAMINOPHEN 650 MG: 325 TABLET, FILM COATED ORAL at 02:59

## 2021-03-02 RX ADMIN — DEXTROSE AND SODIUM CHLORIDE: 5; 900 INJECTION, SOLUTION INTRAVENOUS at 16:28

## 2021-03-02 RX ADMIN — ONDANSETRON 4 MG: 4 TABLET, ORALLY DISINTEGRATING ORAL at 06:36

## 2021-03-02 RX ADMIN — SODIUM CHLORIDE 1000 ML: 9 INJECTION, SOLUTION INTRAVENOUS at 00:52

## 2021-03-02 RX ADMIN — THIAMINE HCL TAB 100 MG 100 MG: 100 TAB at 08:09

## 2021-03-02 RX ADMIN — LEVOTHYROXINE SODIUM 175 MCG: 25 TABLET ORAL at 08:07

## 2021-03-02 RX ADMIN — CALCIUM 500 MG: 500 TABLET ORAL at 18:38

## 2021-03-02 RX ADMIN — DIAZEPAM 10 MG: 5 TABLET ORAL at 10:33

## 2021-03-02 RX ADMIN — ATENOLOL 50 MG: 25 TABLET ORAL at 08:08

## 2021-03-02 RX ADMIN — ACETAMINOPHEN 650 MG: 325 TABLET, FILM COATED ORAL at 08:06

## 2021-03-02 RX ADMIN — DEXTROSE AND SODIUM CHLORIDE: 5; 900 INJECTION, SOLUTION INTRAVENOUS at 04:38

## 2021-03-02 RX ADMIN — FLUOXETINE 20 MG: 20 CAPSULE ORAL at 08:09

## 2021-03-02 RX ADMIN — CALCIUM 500 MG: 500 TABLET ORAL at 08:09

## 2021-03-02 RX ADMIN — DIAZEPAM 10 MG: 5 TABLET ORAL at 16:28

## 2021-03-02 RX ADMIN — FOLIC ACID 1 MG: 1 TABLET ORAL at 08:10

## 2021-03-02 ASSESSMENT — ACTIVITIES OF DAILY LIVING (ADL)
FALL_HISTORY_WITHIN_LAST_SIX_MONTHS: YES
HEARING_DIFFICULTY_OR_DEAF: NO
CONCENTRATING,_REMEMBERING_OR_MAKING_DECISIONS_DIFFICULTY: NO
WEAR_GLASSES_OR_BLIND: YES
PATIENT_/_FAMILY_COMMUNICATION_STYLE: SPOKEN LANGUAGE (ENGLISH OR BILINGUAL)
DIFFICULTY_COMMUNICATING: NO
DOING_ERRANDS_INDEPENDENTLY_DIFFICULTY: NO
NUMBER_OF_TIMES_PATIENT_HAS_FALLEN_WITHIN_LAST_SIX_MONTHS: 1
VISION_MANAGEMENT: READING
WALKING_OR_CLIMBING_STAIRS_DIFFICULTY: NO
TOILETING_ISSUES: NO
DIFFICULTY_EATING/SWALLOWING: NO
DRESSING/BATHING_DIFFICULTY: NO

## 2021-03-02 NOTE — ED NOTES
The patient was accepted at shift change signout with a plan for me to follow-up on a repeat lactate level.  Lactate is coming down nicely, now down to 4.0.  Dr. Fonseca felt that he did not have any concern for ischemic injury nor infectious etiology.  She is safe to transfer to the floor at this time.    Dictation Disclaimer: Some of this Note has been completed with voice-recognition dictation software. Although errors are generally corrected real-time, there is the potential for a rare error to be present in the completed chart.       Abigail Kerns MD  03/02/21 0236

## 2021-03-02 NOTE — PROGRESS NOTES
[facility], [place], [place]  Internal Medicine Progress Note    Assessment and Plans:     Shannon Beckwith is a 60 year old female with a hx of alcohol abuse, arthritis, depression with SI, hypothyroidism, and htn, who presented to the ED for detox and was found to be significantly acidotic with an anion gap due to starvation + alcoholic ketoacidosis.     Acute alcohol intoxication, alcoholic + starvation ketoacidosis chronic alcohol dependence, lactic acid 7.5 on admission, with 3.7 ketones in blood, drug abuse screen only positive for ethanol, Anion Gap of 23, with bicarb 16, AST 52, EtoH breath 0.28, hx of alcohol withdrawal- tremors (no SZ or DTs), no sx/suspicion of sepsis  - MSSA protocol with valium  - Zofran prn  - multivit, folic acid, thiamine  - Consult CD     Hyponatremia On admit Na 130 => 139. Now resolved.     hypocalcemia  8 Ca  - monitor and rehydrate  - replace Ca     Depression hx of SI  - cont home fluoxetine 20 mg daily     Hypothyroidism  - cont home levothyroxine 175 mcg daily     HTN  - cont home atenolol 50mg qam and amlodipine 10 mg qpm with hold parameters     Diet:  reg  DVT Prophylaxis: Low Risk/Ambulatory with no VTE prophylaxis indicated  Almanza Catheter: not present  Code Status:  full       Disposition Plan     Expected discharge: 2 - 3 days, recommended to prior living arrangement once detoxed, lab abnormalities resolved.      Amanda Nguyen MD  Text Page  (7am - 5pm, M-F)    Subjective:      Overnight Events reviewed, Chart Reviewed  Still feels shaky and anxious, has mild HA and nausea.      -Data reviewed today: I reviewed all new labs and imaging results over the last 24 hours.     Exam:     Temp: 96.2  F (35.7  C) Temp src: Oral BP: 122/60 Pulse: 104   Resp: 16 SpO2: 97 % O2 Device: None (Room air)    Vitals:    03/02/21 0109   Weight: 79.4 kg (175 lb)     Vital Signs with Ranges  Temp:  [96.2  F (35.7   C)-98.2  F (36.8  C)] 96.2  F (35.7  C)  Pulse:  [] 104  Resp:  [16] 16  BP: (118-156)/(60-96) 122/60  SpO2:  [94 %-97 %] 97 %  No intake/output data recorded.    Constitutional: No distress noted, Alert, Answering questions appropriately  Respiratory: AE is goog on both sides, no wheezing onr crackles  Cardiovascular: S1S2 normal, no new murmur  GI: Soft, non tender  Skin/Integumen: No rash    Medications     dextrose 5% and 0.9% NaCl 100 mL/hr at 03/02/21 0438       amLODIPine  2.5 mg Oral At Bedtime     atenolol  50 mg Oral Daily     calcium carbonate (OS-MARCELO) 500 mg (elemental) tablet  500 mg Oral BID w/meals     FLUoxetine  20 mg Oral Daily     folic acid  1 mg Oral Daily     levothyroxine  175 mcg Oral Daily     multivitamin w/minerals  1 tablet Oral Daily     thiamine  100 mg Oral Daily       Data   Recent Labs   Lab 03/02/21  0638 03/01/21  2106   WBC  --  9.3   HGB  --  12.0   MCV  --  83   PLT  --  391    130*   POTASSIUM 4.4 4.6   CHLORIDE 105 91*   CO2 26 16*   BUN 7 11   CR 0.50* 0.65   ANIONGAP 8 23*   MARCELO 7.8* 8.0*   * 79   ALBUMIN 3.3* 4.0   PROTTOTAL 6.0* 7.3   BILITOTAL 0.9 0.7   ALKPHOS 104 126   ALT 34 32   AST 63* 52*       Recent Results (from the past 24 hour(s))   CT Head w/o Contrast    Narrative    EXAM: CT HEAD W/O CONTRAST  LOCATION: [facility]  DATE/TIME: 3/1/2021 11:01 PM    INDICATION: Headache  COMPARISON: None.  TECHNIQUE: Routine CT Head without IV contrast. Multiplanar reformats. Dose reduction techniques were used.    FINDINGS:  INTRACRANIAL CONTENTS: No intracranial hemorrhage, extraaxial collection, or mass effect.  No CT evidence of acute infarct. Mild presumed chronic small vessel ischemic changes. Normal ventricles and sulci.     VISUALIZED ORBITS/SINUSES/MASTOIDS: No intraorbital abnormality. No paranasal sinus mucosal disease. No middle ear or mastoid effusion.    BONES/SOFT TISSUES: No acute abnormality.      Impression    IMPRESSION:  1.   No acute intracranial process.

## 2021-03-02 NOTE — PHARMACY-ADMISSION MEDICATION HISTORY
Admission medication history interview status for the 3/1/2021 admission is complete. See Epic admission navigator for allergy information, pharmacy, prior to admission medications and immunization status.     Medication history interview sources:  patient, [facility], Care Everywhere    Changes made to PTA medication list (reason)  Added: atenolol, fluoxetine, amlodipine (per pt, fill hx, & Care Everywhere)  Deleted: none  Changed: none    Additional medication history information (including reliability of information, actions taken by pharmacist):  -Meds all filled recently on 2/17/21. Patient reported she has not taken her home medications for about 5 days prior to admission.    Prior to Admission medications    Medication Sig Last Dose Taking? Auth Provider   amLODIPine (NORVASC) 10 MG tablet Take 10 mg by mouth At Bedtime Past Week at Unknown time Yes Unknown, Entered By History   atenolol (TENORMIN) 50 MG tablet Take 50 mg by mouth daily Past Week at Unknown time Yes Unknown, Entered By History   FLUoxetine (PROZAC) 20 MG capsule Take 20 mg by mouth daily Past Week at Unknown time Yes Unknown, Entered By History   ibuprofen (ADVIL/MOTRIN) 600 MG tablet Take 1 tablet (600 mg) by mouth every 6 hours as needed for other (inflammatory pain) Past Week at Unknown time Yes J Carlos Mcdonald MD   levothyroxine (SYNTHROID/LEVOTHROID) 175 MCG tablet Take 175 mcg by mouth daily Past Week at Unknown time Yes Unknown, Entered By History   Multiple Vitamins-Minerals (MULTI FOR HER PO) Take 1 tablet by mouth daily Past Week at Unknown time Yes Reported, Patient     Medication history completed by: Sherry Calabrese, PharmD, BCPS

## 2021-03-02 NOTE — PLAN OF CARE
Pt arrived from ED and ambulated IND to bed from wheelchair.  VS: /68 (BP Location: Right arm)   Pulse 94   Temp 98.1  F (36.7  C) (Oral)   Resp 16   Wt 79.4 kg (175 lb)   SpO2 95%   BMI 25.84 kg/m    Room air   Output: Voiding spontaneously without difficulty LBM 3/1   Lungs: CTA   Activity: Up with SBA   Skin: intact   Pain:   C/o headache-tylenol given with improvement   Neuro/CMS:   A&Ox4, denies numbness and tingling   Dressing(s):   none   Diet:   Regular- zofran given for nausea, pt to order breakfast    LDA:   L PIV SL- positional   Equipment:   IV pole   Plan:   Continue to monitor and follow plan of care. Able to make needs known and call light within reach.   Additional Info:   MSSA 6, 9  5mg Valium given total

## 2021-03-02 NOTE — ED PROVIDER NOTES
ED Provider Note  [facility]      History     Chief Complaint   Patient presents with     Alcohol Problem     seeking detox from etoh, drinks 1.75L vodka every 2 days. Denies hx w/d seizure.     HPI  Shannon Beckwith is a 60 year old female who presents to the emergency department seeking detox from alcohol.  Patient states that she has been drinking 1.75 L of vodka over a 2-day period for the past 7 days.  Patient states that she resumed daily alcohol consumption 7 days ago.  She reports a history of alcohol withdrawal including tremors.  She denies a history of alcohol withdrawal seizures or DTs.  She states her last drink was just prior to coming to the emergency department.  The patient denies any symptoms of depression or suicidal ideation.  She denies any recent fall or injury.  She denies any recent illness including fever, cough, and dyspnea.  She denies any Covid exposures.     Past Medical History  Past Medical History:   Diagnosis Date     Arthritis      Chemical dependency (H) sober since July 2016 per PCP, multiple prior inpatient hospital stays for EtOH abuse     Depression      Dyspnea on exertion      History of blood transfusion      Hypertension pre hypertensive     Obese     eating disorder     Problem, psychiatric     Alcohlic     Sleep apnea 2000    Not a problem since weight loss surgery      Thyroid disease      Tracheal stenosis      Past Surgical History:   Procedure Laterality Date     ABDOMEN SURGERY      gastric bypass, elaine-en-y     APPENDECTOMY       ARTHRODESIS FOOT Right 3/30/2017    Procedure: ARTHRODESIS FOOT;  Surgeon: J Carlos Mcdonald MD;  Location:  OR     BRONCHOSCOPY RIGID  2/14/2012    Procedure:BRONCHOSCOPY RIGID; Surgeon:TONNY PENA; Location:[facility] OR     GRAFT BONE FROM ILIAC CREST Right 3/30/2017    Procedure: GRAFT BONE FROM ILIAC CREST;  Surgeon: J Carlos Mcdonald MD;  Location:  OR     INJECT STEROID (LOCATION) N/A 10/20/2016     Procedure: INJECT STEROID (LOCATION);  Surgeon: Aylin Del Rio MD;  Location: [facility] OR     LARYNGOSCOPY, ESOPHAGOSCOPY WITH DILATION, COMBINED  2012    Procedure:COMBINED LARYNGOSCOPY, ESOPHAGOSCOPY WITH DILATION; Direct Laryngoscopy, Rigid Bronchoscopy, Balloon Dilation of Subglottic Stenosis  **latex safe; Surgeon:TONNY PENA; Location:[facility] OR     LASER CO2 LARYNGOSCOPY, COMPLEX  2012    Procedure: LASER CO2 LARYNGOSCOPY, COMPLEX;  Microdirect Laryngoscopy, Incision and Dilation,  CO2 Laser, Kenalog Injection;  Surgeon: Aylin Del Rio MD;  Location: [facility] OR     LASER CO2 LARYNGOSCOPY, COMPLEX N/A 10/20/2016    Procedure: LASER CO2 LARYNGOSCOPY, COMPLEX;  Surgeon: Aylin Del Rio MD;  Location: [facility] OR     subglottic dilatation       wisdom teeth removed[       aspirin - buffered (ASCRIPTIN) 325 MG TABS tablet  CHLORTHALIDONE PO  diazepam (VALIUM) 10 MG tablet  ibuprofen (ADVIL/MOTRIN) 600 MG tablet  Levothyroxine Sodium (SYNTHROID PO)  Multiple Vitamins-Minerals (MULTI FOR HER PO)  order for DME  oxyCODONE (ROXICODONE) 5 MG IR tablet  Sertraline HCl (ZOLOFT PO)  TRAZODONE HCL PO      Allergies   Allergen Reactions     Nkda [No Known Drug Allergies]      Family History  Family History   Problem Relation Age of Onset     Alcohol/Drug Sister      Arthritis Mother      Thyroid Disease Mother      Depression Sister      Depression Sister      Obesity Sister      Obesity Sister      Obesity Sister      Depression Sister      Hypertension Sister      Thyroid Disease Sister      Diabetes Father      Hypertension Father      Social History   Social History     Tobacco Use     Smoking status: Former Smoker     Years: 5.00     Types: Cigarettes     Start date: 1976     Quit date: 1980     Years since quittin.1   Substance Use Topics     Alcohol use: No     Comment: since 2016     Drug use: No      Past medical history, past surgical history, medications,  allergies, family history, and social history were reviewed with the patient. No additional pertinent items.       Review of Systems   Constitutional: Negative for fever.   HENT: Negative for congestion.    Eyes: Negative for redness.   Respiratory: Negative for shortness of breath.    Cardiovascular: Negative for chest pain.   Gastrointestinal: Negative for abdominal pain.   Genitourinary: Negative for difficulty urinating.   Musculoskeletal: Negative for arthralgias and neck stiffness.   Skin: Negative for color change.   Neurological: Negative for headaches.   Psychiatric/Behavioral: Negative for confusion.   All other systems reviewed and are negative.    A complete review of systems was performed with pertinent positives and negatives noted in the HPI, and all other systems negative.    Physical Exam   BP: (!) 156/96  Pulse: 101  Temp: 98.2  F (36.8  C)  Resp: 16  SpO2: 97 %  Physical Exam  Vitals signs and nursing note reviewed.   Constitutional:       General: She is not in acute distress.     Appearance: Normal appearance. She is not diaphoretic.   HENT:      Head: Normocephalic and atraumatic.      Mouth/Throat:      Pharynx: No oropharyngeal exudate.   Eyes:      General: No scleral icterus.     Pupils: Pupils are equal, round, and reactive to light.   Cardiovascular:      Rate and Rhythm: Normal rate and regular rhythm.      Pulses: Normal pulses.      Heart sounds: Normal heart sounds.   Pulmonary:      Effort: Pulmonary effort is normal. No respiratory distress.      Breath sounds: Normal breath sounds.   Abdominal:      General: Bowel sounds are normal.      Palpations: Abdomen is soft.      Tenderness: There is no abdominal tenderness.   Musculoskeletal: Normal range of motion.         General: No tenderness.   Skin:     General: Skin is warm and dry.      Findings: No rash.   Neurological:      General: No focal deficit present.      Mental Status: She is alert.      Motor: No weakness.       Coordination: Coordination normal.   Psychiatric:         Mood and Affect: Mood normal.         ED Course      Procedures        Results for orders placed or performed during the hospital encounter of 03/01/21   CT Head w/o Contrast     Status: None    Narrative    EXAM: CT HEAD W/O CONTRAST  LOCATION: [facility]  DATE/TIME: 3/1/2021 11:01 PM    INDICATION: Headache  COMPARISON: None.  TECHNIQUE: Routine CT Head without IV contrast. Multiplanar reformats. Dose reduction techniques were used.    FINDINGS:  INTRACRANIAL CONTENTS: No intracranial hemorrhage, extraaxial collection, or mass effect.  No CT evidence of acute infarct. Mild presumed chronic small vessel ischemic changes. Normal ventricles and sulci.     VISUALIZED ORBITS/SINUSES/MASTOIDS: No intraorbital abnormality. No paranasal sinus mucosal disease. No middle ear or mastoid effusion.    BONES/SOFT TISSUES: No acute abnormality.      Impression    IMPRESSION:  1.  No acute intracranial process.   CBC with platelets differential     Status: Abnormal   Result Value Ref Range    WBC 9.3 4.0 - 11.0 10e9/L    RBC Count 4.39 3.8 - 5.2 10e12/L    Hemoglobin 12.0 11.7 - 15.7 g/dL    Hematocrit 36.5 35.0 - 47.0 %    MCV 83 78 - 100 fl    MCH 27.3 26.5 - 33.0 pg    MCHC 32.9 31.5 - 36.5 g/dL    RDW 19.3 (H) 10.0 - 15.0 %    Platelet Count 391 150 - 450 10e9/L    Diff Method Automated Method     % Neutrophils 77.3 %    % Lymphocytes 13.5 %    % Monocytes 7.3 %    % Eosinophils 0.5 %    % Basophils 1.2 %    % Immature Granulocytes 0.2 %    Nucleated RBCs 0 0 /100    Absolute Neutrophil 7.2 1.6 - 8.3 10e9/L    Absolute Lymphocytes 1.3 0.8 - 5.3 10e9/L    Absolute Monocytes 0.7 0.0 - 1.3 10e9/L    Absolute Eosinophils 0.1 0.0 - 0.7 10e9/L    Absolute Basophils 0.1 0.0 - 0.2 10e9/L    Abs Immature Granulocytes 0.0 0 - 0.4 10e9/L    Absolute Nucleated RBC 0.0    Comprehensive metabolic panel     Status: Abnormal   Result Value Ref Range    Sodium 130 (L) 133  - 144 mmol/L    Potassium 4.6 3.4 - 5.3 mmol/L    Chloride 91 (L) 94 - 109 mmol/L    Carbon Dioxide 16 (L) 20 - 32 mmol/L    Anion Gap 23 (H) 3 - 14 mmol/L    Glucose 79 70 - 99 mg/dL    Urea Nitrogen 11 7 - 30 mg/dL    Creatinine 0.65 0.52 - 1.04 mg/dL    GFR Estimate >90 >60 mL/min/[1.73_m2]    GFR Estimate If Black >90 >60 mL/min/[1.73_m2]    Calcium 8.0 (L) 8.5 - 10.1 mg/dL    Bilirubin Total 0.7 0.2 - 1.3 mg/dL    Albumin 4.0 3.4 - 5.0 g/dL    Protein Total 7.3 6.8 - 8.8 g/dL    Alkaline Phosphatase 126 40 - 150 U/L    ALT 32 0 - 50 U/L    AST 52 (H) 0 - 45 U/L   Drug abuse screen 6 urine (chem dep)     Status: Abnormal   Result Value Ref Range    Amphetamine Qual Urine Negative NEG^Negative    Barbiturates Qual Urine Negative NEG^Negative    Benzodiazepine Qual Urine Negative NEG^Negative    Cannabinoids Qual Urine Negative NEG^Negative    Cocaine Qual Urine Negative NEG^Negative    Ethanol Qual Urine Positive (A) NEG^Negative    Opiates Qualitative Urine Negative NEG^Negative   Magnesium     Status: None   Result Value Ref Range    Magnesium 1.7 1.6 - 2.3 mg/dL   Lactic acid     Status: Abnormal   Result Value Ref Range    Lactic Acid 7.5 (HH) 0.7 - 2.0 mmol/L   Ketone Beta-Hydroxybutyrate Quantitative     Status: Abnormal   Result Value Ref Range    Ketone Quantitative 3.7 (HH) 0.0 - 0.6 mmol/L   Alcohol breath test POCT     Status: Abnormal   Result Value Ref Range    Alcohol Breath Test 0.28 (A) 0.00 - 0.01      The Lactic acid level is elevated due to alcohol liver disease, at this time there is no sign of severe sepsis or septic shock.        Results for orders placed or performed during the hospital encounter of 03/01/21   CT Head w/o Contrast     Status: None    Narrative    EXAM: CT HEAD W/O CONTRAST  LOCATION: [facility]  DATE/TIME: 3/1/2021 11:01 PM    INDICATION: Headache  COMPARISON: None.  TECHNIQUE: Routine CT Head without IV contrast. Multiplanar reformats. Dose reduction  techniques were used.    FINDINGS:  INTRACRANIAL CONTENTS: No intracranial hemorrhage, extraaxial collection, or mass effect.  No CT evidence of acute infarct. Mild presumed chronic small vessel ischemic changes. Normal ventricles and sulci.     VISUALIZED ORBITS/SINUSES/MASTOIDS: No intraorbital abnormality. No paranasal sinus mucosal disease. No middle ear or mastoid effusion.    BONES/SOFT TISSUES: No acute abnormality.      Impression    IMPRESSION:  1.  No acute intracranial process.   CBC with platelets differential     Status: Abnormal   Result Value Ref Range    WBC 9.3 4.0 - 11.0 10e9/L    RBC Count 4.39 3.8 - 5.2 10e12/L    Hemoglobin 12.0 11.7 - 15.7 g/dL    Hematocrit 36.5 35.0 - 47.0 %    MCV 83 78 - 100 fl    MCH 27.3 26.5 - 33.0 pg    MCHC 32.9 31.5 - 36.5 g/dL    RDW 19.3 (H) 10.0 - 15.0 %    Platelet Count 391 150 - 450 10e9/L    Diff Method Automated Method     % Neutrophils 77.3 %    % Lymphocytes 13.5 %    % Monocytes 7.3 %    % Eosinophils 0.5 %    % Basophils 1.2 %    % Immature Granulocytes 0.2 %    Nucleated RBCs 0 0 /100    Absolute Neutrophil 7.2 1.6 - 8.3 10e9/L    Absolute Lymphocytes 1.3 0.8 - 5.3 10e9/L    Absolute Monocytes 0.7 0.0 - 1.3 10e9/L    Absolute Eosinophils 0.1 0.0 - 0.7 10e9/L    Absolute Basophils 0.1 0.0 - 0.2 10e9/L    Abs Immature Granulocytes 0.0 0 - 0.4 10e9/L    Absolute Nucleated RBC 0.0    Comprehensive metabolic panel     Status: Abnormal   Result Value Ref Range    Sodium 130 (L) 133 - 144 mmol/L    Potassium 4.6 3.4 - 5.3 mmol/L    Chloride 91 (L) 94 - 109 mmol/L    Carbon Dioxide 16 (L) 20 - 32 mmol/L    Anion Gap 23 (H) 3 - 14 mmol/L    Glucose 79 70 - 99 mg/dL    Urea Nitrogen 11 7 - 30 mg/dL    Creatinine 0.65 0.52 - 1.04 mg/dL    GFR Estimate >90 >60 mL/min/[1.73_m2]    GFR Estimate If Black >90 >60 mL/min/[1.73_m2]    Calcium 8.0 (L) 8.5 - 10.1 mg/dL    Bilirubin Total 0.7 0.2 - 1.3 mg/dL    Albumin 4.0 3.4 - 5.0 g/dL    Protein Total 7.3 6.8 - 8.8 g/dL     Alkaline Phosphatase 126 40 - 150 U/L    ALT 32 0 - 50 U/L    AST 52 (H) 0 - 45 U/L   Drug abuse screen 6 urine (chem dep)     Status: Abnormal   Result Value Ref Range    Amphetamine Qual Urine Negative NEG^Negative    Barbiturates Qual Urine Negative NEG^Negative    Benzodiazepine Qual Urine Negative NEG^Negative    Cannabinoids Qual Urine Negative NEG^Negative    Cocaine Qual Urine Negative NEG^Negative    Ethanol Qual Urine Positive (A) NEG^Negative    Opiates Qualitative Urine Negative NEG^Negative   Magnesium     Status: None   Result Value Ref Range    Magnesium 1.7 1.6 - 2.3 mg/dL   Lactic acid     Status: Abnormal   Result Value Ref Range    Lactic Acid 7.5 (HH) 0.7 - 2.0 mmol/L   Ketone Beta-Hydroxybutyrate Quantitative     Status: Abnormal   Result Value Ref Range    Ketone Quantitative 3.7 (HH) 0.0 - 0.6 mmol/L   Alcohol breath test POCT     Status: Abnormal   Result Value Ref Range    Alcohol Breath Test 0.28 (A) 0.00 - 0.01     I have personally viewed the patient's labs, urine results, and head CT.    Medications   acetaminophen (TYLENOL) tablet 650 mg (650 mg Oral Given 3/1/21 2302)   0.9% sodium chloride BOLUS (has no administration in time range)   diazepam (VALIUM) tablet 5-20 mg (has no administration in time range)   0.9% sodium chloride BOLUS (0 mLs Intravenous Stopped 3/1/21 2216)   ondansetron (ZOFRAN) injection 4 mg (4 mg Intravenous Given 3/1/21 2109)   dextrose 5% and 0.9% NaCl 1,000 mL with Infuvite Adult 10 mL, thiamine 100 mg, folic acid 1 mg infusion ( Intravenous New Bag 3/1/21 2244)   ondansetron (ZOFRAN) injection 4 mg (4 mg Intravenous Given 3/1/21 2252)        Assessments & Plan (with Medical Decision Making)   60 year old female with alcohol abuse and dependence to the emergency department seeking detox.  The patient been drinking alcohol daily.  She has not been eating.  She has experienced withdrawal symptoms in the past with attempted alcohol cessation.  Patient arrives to the  emergency room with an elevated alcohol level.  Differential diagnosis includes alcohol dependence, electrolyte disturbance, sedative hypnotic intoxication.  The patient's admission labs are remarkable for an anion gap acidosis.  Differential diagnosis includes: Ketoacidosis, lactic acidosis related to alcohol abuse and liver disease, or sepsis.  The patient does not have fever or any infectious symptoms.  Do not suspect sepsis.  He did complain of headache so CT scan was performed and does not reveal any acute intracranial pathology.  Treatment has been initiated with IV normal saline.  Subsequent D5 NS banana bag given.  We will give another liter of normal saline and recheck lactate level.  The patient will require admission to the medicine service to correct lab abnormalities consistent with malnutrition and alcoholic ketoacidosis.  Signed out at change of shift pending further IV fluid resuscitation and lactate level rechecked.    I have reviewed the nursing notes. I have reviewed the findings, diagnosis, plan and need for follow up with the patient.    New Prescriptions    No medications on file       Final diagnoses:   Alcoholic ketoacidosis   Lactate blood increase   Hyponatremia     Chart documentation was completed with Dragon voice-recognition software. Even though reviewed, this chart may still contain some grammatical, spelling, and word errors.     --  Edis Fonseca Md  [facility] EMERGENCY DEPARTMENT  3/1/2021     Edis Fonseca MD  03/01/21 4900

## 2021-03-02 NOTE — H&P
[facility]    History and Physical - Hospitalist Service       Date of Admission:  3/1/2021    Assessment & Plan   Shannon Beckwith is a 60 year old female with a hx of alcohol abuse, arthritis, depression with SI, hypothyroidism, and htn, who presented to the ED for detox and was found to be significantly acidotic with an anion gap due to starvation + alcoholic ketoacidosis.    Acute alcohol intoxication, alcoholic + starvation ketoacidosis- chronic alcohol dependence, lactic acid 7.5 on admission, with 3.7 ketones in blood, drug abuse screen only positive for ethanol, Anion Gap of 23, with bicarb 16, AST 52, EtoH breath 0.28, hx of alcohol withdrawal- tremors (no SZ or DTs), no sx/suspicion of sepsis  - [facility] protocol  - zofran prn  - multivit, folic acid, thiamine    Hyponatremia, hypocalcemia- 130 Na, 8 Ca  - monitor and rehydrate  - replace Ca    Depression- hx of SI  - cont home fluoxetine 20 mg daily    Hypothyroidism-  - cont home levothyroxine 175 mcg daily    HTN-   - cont home atenolol 50mg qam and amlodipine 10 mg qpm with hold parameters     Diet:  reg  DVT Prophylaxis: Low Risk/Ambulatory with no VTE prophylaxis indicated  Almanza Catheter: not present  Code Status:  full         Disposition Plan   Expected discharge: 2 - 3 days, recommended to prior living arrangement once detoxed, lab abnormalities resolved.  Entered: Juju Pemberton MD 03/02/2021, 1:08 AM     The patient's care was discussed with the Bedside Nurse and Patient.    Juju Pemberton MD  [facility]  Contact information available via [facility] Paging/Directory  ______________________________________________________________________    Chief Complaint   Wants to detox    History is obtained from the patient    History of Present Illness   Shannon Beckwith is a 60 year old female with a hx of alcohol abuse, arthritis, depression with SI, hypothyroidism,  and htn, who presented to the ED for detox after drinking 1.75L vodka over 2 days for the 7 days prior to admission. She had a headache. She has a history of alcohol withdrawal with tremors and chills/hot flashes but no seizures/DTs. Her last drink was just prior to presentation in ED. She is depressed but does not currently have any SI. In the ED she was gagging with dry heaves and started shaking. She has not eaten anything for the past 4 days.     She was at a retreat (AA study, not clinical) looking for spiritual help from the end of Dec through Jan22. As soon as she left she began to binge again, as her  served her divorce papers while she was at the retreat. She was able to get sober on her own until 1 week ago, at which time she started binging vodka again.    She was given tylenol, zofran, a banana bag, NS bolus x2, and diazepam per [facility] protocol    Review of Systems    The 10 point Review of Systems is negative other than noted in the HPI or here.    Past Medical History    I have reviewed this patient's medical history and updated it with pertinent information if needed.   Past Medical History:   Diagnosis Date     Arthritis      Chemical dependency (H) sober since July 2016 per PCP, multiple prior inpatient hospital stays for EtOH abuse     Depression      Dyspnea on exertion      History of blood transfusion      Hypertension pre hypertensive     Obese     eating disorder     Problem, psychiatric     Alcohlic     Sleep apnea 2000    Not a problem since weight loss surgery      Thyroid disease      Tracheal stenosis      Past Surgical History   I have reviewed this patient's surgical history and updated it with pertinent information if needed.  Past Surgical History:   Procedure Laterality Date     ABDOMEN SURGERY      gastric bypass, elaine-en-y     APPENDECTOMY       ARTHRODESIS FOOT Right 3/30/2017    Procedure: ARTHRODESIS FOOT;  Surgeon: J Carlos Mcdonald MD;  Location: [facility] OR     BRONCHOSCOPY  RIGID  2012    Procedure:BRONCHOSCOPY RIGID; Surgeon:TONNY PENA; Location:[facility] OR     GRAFT BONE FROM ILIAC CREST Right 3/30/2017    Procedure: GRAFT BONE FROM ILIAC CREST;  Surgeon: J Carlos Mcdonald MD;  Location: [facility] OR     INJECT STEROID (LOCATION) N/A 10/20/2016    Procedure: INJECT STEROID (LOCATION);  Surgeon: Aylin Del Rio MD;  Location: [facility] OR     LARYNGOSCOPY, ESOPHAGOSCOPY WITH DILATION, COMBINED  2012    Procedure:COMBINED LARYNGOSCOPY, ESOPHAGOSCOPY WITH DILATION; Direct Laryngoscopy, Rigid Bronchoscopy, Balloon Dilation of Subglottic Stenosis  **latex safe; Surgeon:TONNY PENA; Location:[facility] OR     LASER CO2 LARYNGOSCOPY, COMPLEX  2012    Procedure: LASER CO2 LARYNGOSCOPY, COMPLEX;  Microdirect Laryngoscopy, Incision and Dilation,  CO2 Laser, Kenalog Injection;  Surgeon: Aylin Del Rio MD;  Location: [facility] OR     LASER CO2 LARYNGOSCOPY, COMPLEX N/A 10/20/2016    Procedure: LASER CO2 LARYNGOSCOPY, COMPLEX;  Surgeon: Aylin Del Rio MD;  Location: [facility] OR     subglottic dilatation       wisdom teeth removed[       Social History   I have reviewed this patient's social history and updated it with pertinent information if needed.  Social History     Tobacco Use     Smoking status: Former Smoker     Years: 5.00     Types: Cigarettes, social smoker only     Start date: 1976     Quit date: 1980, quit again in Dec 2020     Years since quittin.1   Substance Use Topics     Alcohol use: yes     Quit once in 2016 and again in 2021     Drug use: No     Family History   I have reviewed this patient's family history and updated it with pertinent information if needed.  Family History   Problem Relation Age of Onset     Alcohol/Drug Sister      Arthritis Mother      Thyroid Disease Mother      Depression Sister      Depression Sister      Obesity Sister      Obesity Sister      Obesity Sister      Depression Sister      Hypertension  Sister      Thyroid Disease Sister      Diabetes Father      Hypertension Father      Prior to Admission Medications   Prior to Admission Medications   Prescriptions Last Dose Informant Patient Reported? Taking?   Levothyroxine Sodium (SYNTHROID PO)  Self Yes No   Sig: Take 175 mcg by mouth daily   Multiple Vitamins-Minerals (MULTI FOR HER PO)  Self Yes No   Sig: Take 1 tablet by mouth daily   ibuprofen (ADVIL/MOTRIN) 600 MG tablet   No No   Sig: Take 1 tablet (600 mg) by mouth every 6 hours as needed for other (inflammatory pain)   order for DME   No No   Sig: Equipment being ordered: Walker Wheels () and Walker ()  Treatment Diagnosis: Difficulty ambulating      Facility-Administered Medications: None   Atenolol 50 mg qam and amlodipine 10 mg at bedtime  Fluoxetine 20 mg daily    Allergies   Allergies   Allergen Reactions     Nkda [No Known Drug Allergies]      Physical Exam   Vital Signs: Temp: 98.2  F (36.8  C) Temp src: Oral BP: (!) 152/89 Pulse: 91   Resp: 16 SpO2: 96 % O2 Device: None (Room air)      Gen: alert, lying in bed calmly. Flushed to touch  HEENT: no scleral icterus or injection dahiana,no rhinorrhea, dry MM  Resp: CTAB, no w/c, no increased work of breathing  CV: RRR, no m/r/g  Abd: soft, NT/ND, + BS  Neuro: tremulous, no focal deficits noted  Musc: MAEE, no dahiana pedal pitting edema  Skin: no rashes or lesions noted  Psych: sad, apologetic mood    Data   Data reviewed today: I reviewed all medications, new labs and imaging results over the last 24 hours.     Lactic acid 7.5-->4.0 with 2L NS in ED    Most Recent 3 CBC's:  Recent Labs   Lab Test 03/01/21 2106   WBC 9.3   HGB 12.0   MCV 83        Most Recent 3 BMP's:  Recent Labs   Lab Test 03/01/21 2106   *   POTASSIUM 4.6   CHLORIDE 91*   CO2 16*   BUN 11   CR 0.65   ANIONGAP 23*   MARCELO 8.0*   GLC 79     Most Recent 2 LFT's:  Recent Labs   Lab Test 03/01/21 2106   AST 52*   ALT 32   ALKPHOS 126   BILITOTAL 0.7     Recent Results  (from the past 24 hour(s))   CT Head w/o Contrast    Narrative    EXAM: CT HEAD W/O CONTRAST  LOCATION: [facility]  DATE/TIME: 3/1/2021 11:01 PM    INDICATION: Headache  COMPARISON: None.  TECHNIQUE: Routine CT Head without IV contrast. Multiplanar reformats. Dose reduction techniques were used.    FINDINGS:  INTRACRANIAL CONTENTS: No intracranial hemorrhage, extraaxial collection, or mass effect.  No CT evidence of acute infarct. Mild presumed chronic small vessel ischemic changes. Normal ventricles and sulci.     VISUALIZED ORBITS/SINUSES/MASTOIDS: No intraorbital abnormality. No paranasal sinus mucosal disease. No middle ear or mastoid effusion.    BONES/SOFT TISSUES: No acute abnormality.      Impression    IMPRESSION:  1.  No acute intracranial process.

## 2021-03-02 NOTE — PROGRESS NOTES
SPIRITUAL HEALTH SERVICES  SPIRITUAL ASSESSMENT Progress Note  [facility] ([facility]) [address]     REFERRAL SOURCE: Consult    Shannon Rodriguez shared a reflective conversation with me about her alcohol addiction and described how it began when she had bariatric surgery. She merely switched one addiction for another. She's stated what she's tried and shared several resources she's aware of  and has some connection to.     When I asked if she had a person she trusted and who was healthy who could help her decide on next steps she said yes.   She shared how she feels too tired to talk with people, but has been texting.  We discussed how to let those people in and yet set time limit boundaries because of her energy level.  That way she wouldn't be as isolated.      Shannon Rodriguez also discussed her 's wish for divorce and we spoke of grief and loss around that and other parts of her life.  We prayed together.    PLAN: [facility] remains available per pt/request.    Rosa Nelson  Chaplain Resident  Pager: [phone]

## 2021-03-02 NOTE — PLAN OF CARE
VS:   /68 (BP Location: Right arm)   Pulse 69   Temp 97.9  F (36.6  C) (Oral)   Resp 16   Wt 79.4 kg (175 lb)   SpO2 99%   BMI 25.84 kg/m    VSS, except tachy. Denies any CP/SOB. LS clear, O2 sats upper 90's on RA.    Output:   Voiding spontaneously without difficulty. BS+. Loose stool x1 this am.    Activity:   SBA d/t tremors and unsteady gait   Skin: No issues   Pain:   Tylenol for HA    Neuro/CMS:   MSSA 14 0700- 5mg valium admin  MSSA 14 1100- 10mg valium admin    Scoring mostly d/t anxiousness, tremors, sweating   Dressing(s):   None   Diet:   Reg diet. Pt was slightly nauseous overnight, improved with zofran admin. Eggs and banana for breakfast tolerated well.    LDA:   New IV placed in L forearm   Equipment:      Plan:      Additional Info:

## 2021-03-03 PROCEDURE — H0001 ALCOHOL AND/OR DRUG ASSESS: HCPCS

## 2021-03-03 PROCEDURE — 250N000013 HC RX MED GY IP 250 OP 250 PS 637: Performed by: INTERNAL MEDICINE

## 2021-03-03 PROCEDURE — 99232 SBSQ HOSP IP/OBS MODERATE 35: CPT | Performed by: HOSPITALIST

## 2021-03-03 PROCEDURE — 120N000002 HC R&B MED SURG/OB UMMC

## 2021-03-03 PROCEDURE — 258N000003 HC RX IP 258 OP 636: Performed by: INTERNAL MEDICINE

## 2021-03-03 RX ADMIN — LEVOTHYROXINE SODIUM 175 MCG: 25 TABLET ORAL at 08:20

## 2021-03-03 RX ADMIN — ACETAMINOPHEN 650 MG: 325 TABLET, FILM COATED ORAL at 00:59

## 2021-03-03 RX ADMIN — ACETAMINOPHEN 650 MG: 325 TABLET, FILM COATED ORAL at 05:31

## 2021-03-03 RX ADMIN — DEXTROSE AND SODIUM CHLORIDE: 5; 900 INJECTION, SOLUTION INTRAVENOUS at 02:16

## 2021-03-03 RX ADMIN — MULTIPLE VITAMINS W/ MINERALS TAB 1 TABLET: TAB at 08:20

## 2021-03-03 RX ADMIN — AMLODIPINE BESYLATE 2.5 MG: 2.5 TABLET ORAL at 21:45

## 2021-03-03 RX ADMIN — FLUOXETINE 20 MG: 20 CAPSULE ORAL at 08:20

## 2021-03-03 RX ADMIN — FOLIC ACID 1 MG: 1 TABLET ORAL at 08:20

## 2021-03-03 RX ADMIN — ATENOLOL 50 MG: 25 TABLET ORAL at 08:20

## 2021-03-03 RX ADMIN — THIAMINE HCL TAB 100 MG 100 MG: 100 TAB at 08:20

## 2021-03-03 RX ADMIN — DIAZEPAM 5 MG: 5 TABLET ORAL at 00:59

## 2021-03-03 RX ADMIN — DIAZEPAM 5 MG: 5 TABLET ORAL at 05:31

## 2021-03-03 NOTE — PROGRESS NOTES
"Type Of Assessment: Inpatient Substance Use Comprehensive Assessment    Referral Source:  [facility] Unit Phone 8A: [phone]    MRN: [id number]    DATE OF SERVICE: March 3, 2021  Date of previous JACKIE Assessment: 21 at [facility]  Patient confirmed identity through two factor verification:  and SSN    PATIENT'S NAME: Shannon Beckwith  Age: 60 year old  Last 4 SSN: [id number]  Sex: female   Gender Identity: female  Sexual Orientation: Heterosexual  Cultural Background: Yes, white-Belarusian  YOB: 1960  Current Address:   [address] [place] [id number]  Patient Phone Number:  [phone]  Patient's E-Mail Contact:  [email or web address]  Funding: [facility]  PMI: SYDNI    Telemedicine Visit: The patient's condition can be safely assessed and treated via synchronous audio and visual telemedicine encounter.    Reason for Telemedicine Visit: Patient required immediate assessment / treatment   Originating Site (Patient Location): [address] [address] Unit Phone 8A: [phone]  Distant Site (Provider Location): [facility]: Unit Phone 8A: [phone]  Consent:  The patient/guardian has verbally consented to: the potential risks and benefits of telemedicine (video visit) versus in person care; bill my insurance or make self-payment for services provided; and responsibility for payment of non-covered services.   Mode of Communication:  Video Conference via Telephone visit    START TIME: 10:59 AM  END TIME: 11:57 AM    As the provider I attest to compliance with applicable laws and regulations related to telemedicine.      Shannon Beckwith was seen for a substance use disorder consult on 3/3/2021 by MOLLY Hicks.       Reason for Substance Use Disorder Consult:  \"I am alcoholic and I have not been able to maintain sobriety.\"  Shannon Beckwith is a 60 year old female with a hx of alcohol " abuse, arthritis, depression with SI, hypothyroidism, and htn, who presented to the ED for detox and was found to be significantly acidotic with an anion gap due to starvation + alcoholic ketoacidosis.    Are you currently having severe withdrawal symptoms that are putting yourself or others in danger? Yes, explain: went to [facility] for medical stabilization.  Are you currently having severe medical problems that require immediate attention? Yes, explain: Patient is at [facility] for medical stabilization. Unit Phone 8A: [phone]    Are you currently having severe emotional or behavioral problems that are putting yourself or others at risk of harm? No    Have you participated in prior substance use disorder evaluations? Yes. When, Where, and What circumstances: Went to [facility] 5 times; and recently to the [facility] for 30 days.   Have you ever been to detox, inpatient or outpatient treatment for substance related use? List previous treatment: Yes. When, Where, and What circumstances: 6 times in detox; 5 times at [facility] ([phone]) and the [facility] for 30 days.  Have you ever had a gambling problem or had treatment for compulsive gambling? No  Patient is in active withdrawal, but is currently admitted to [facility] Unit [facility] for medical detoxification and withdrawal monitoring and is not an imminent safety risk to self or others, and may proceed with the assessment interview    Comprehensive Substance Use History   X X = Primary Drug Used Age of First Use    Pattern of Substance Use   (heaviest use in life and a use history within the past year if applicable) (DSM-5: Sx #3) Date /  Quantity of last use if within the past 30 days Withdrawal Potential?   Method of use  (Oral, smoked, snorted, IV, etc)    Alcohol   48 I did not drink even in my own wedding, but I started drinking when I was 48 yrs old, once a month not much.  The I had Gastric bypass in 2003; in 2005 then my drinking  "started to ramp up one glass of wine from one drink a week to daily wine bottle drinking.  The last 5 years I have been binge drinking a liter and half every two days for 7 days in row.  My drinking has changed, I could stay sober for a short period of time and then went on a period of binge drinking. 3/1/21 at 7 PM no oral    Marijuana/Hashish   No use        Cocaine/Crack No use        Meth/Amphetamines   No use        Heroin   No use        Other Opiates/Synthetics   No use        Inhalants  No use        Benzodiazepines   No use        Hallucinogens   No use        Barbiturates/Sedatives/Hypnotics   No use        Over-the-Counter Drugs   No use        Other   No use        Nicotine   No use         Withdrawal symptoms: Have you had any of the following withdrawal symptoms?  Sweating (Rapid Pulse)  Shaky / Jittery / Tremors  Unable to Sleep  Agitation  Headache  Fatigue / Extremely Tired  Sad / Depressed Feeling  High Blood Pressure  Diarrhea  Diminished Appetite  Unable to Eat  Anxiety / Worried    Have you experienced any cravings?  Yes    Have you had periods of abstinence?  Yes  What was your longest period? \"I had 8 months sober-when I moved into a sober house where I had to go to meetings and sober [place].\"    Any circumstances that lead to relapse? \"My  recently served me  papers; other time it's sadness; loneliness or happy times.\"    What activities have you engaged in when using alcohol/other drugs that could be hazardous to you or others? (i.e. driving a car/motorcycle/boat, operating machinery, unsafe sex, sharing needles for drugs or tattoos, etc ) The patient reported having a history of driving while under the influence of alcohol or drugs.    A description of any risk-taking behavior, including behavior that puts the client at risk of exposure to blood-borne or sexually transmitted diseases: Patient denies.    Arrests and legal interventions related to substance use: \"I had a DUI in " "11/2011 where I started to get help.\"    A description of how the patient's use affected their ability to function appropriately in a work setting: \"yes, I have lost 4 good paying jobs since 2012 till recently\"    A description of how the patient's use affected their ability to function appropriately in an educational setting: Patient denies.    Leisure time activities that are associated with substance use: \"Yes or no-dancing, playing cards, camping.\"    Do you think your substance use has become a problem for you? She agrees she has a substance abuse problem.    MEDICAL HISTORY  Physical or medical concerns or diagnoses:   Past Medical History:   Diagnosis Date     Arthritis       Chemical dependency (H) sober since July 2016 per PCP, multiple prior inpatient hospital stays for EtOH abuse     Depression       Dyspnea on exertion       History of blood transfusion       Hypertension pre hypertensive     Obese       eating disorder     Problem, psychiatric       Alcohlic     Sleep apnea 2000     Not a problem since weight loss surgery      Thyroid disease       Tracheal stenosis             Do you have any current medical treatment needs not being addressed by inpatient treatment?  No    Do you need a referral for a medical provider? No.    Current medications:   Patient reports current meds as:   Outpatient Medications Marked as Taking for the 3/1/21 encounter (Hospital Encounter)   Medication Sig     amLODIPine (NORVASC) 10 MG tablet Take 10 mg by mouth At Bedtime     atenolol (TENORMIN) 50 MG tablet Take 50 mg by mouth daily     FLUoxetine (PROZAC) 20 MG capsule Take 20 mg by mouth daily     ibuprofen (ADVIL/MOTRIN) 600 MG tablet Take 1 tablet (600 mg) by mouth every 6 hours as needed for other (inflammatory pain)     levothyroxine (SYNTHROID/LEVOTHROID) 175 MCG tablet Take 175 mcg by mouth daily     Multiple Vitamins-Minerals (MULTI FOR HER PO) Take 1 tablet by mouth daily         Are you pregnant? No    Do you " "have any specific physical needs/accommodations? No    MENTAL HEALTH HISTORY:  Have you ever had  hospitalizations or treatment for mental health illness: No    Mental health history, including diagnosis and symptoms, and the effect on the client's ability to function: \"Patient reports Dx of depression-I have been very sad and lonely.\"    Current mental health treatment including psychotropic medication needed to maintain stability: (Note: The assessment must utilize screening tools approved by the commissioner pursuant to section 245.4633 to identify whether the client screens positive for co-occurring disorders): See medications listed above.    GAIN-SS Tool:  When was the last time that you had significant problems     with feeling very trapped, lonely, sad, blue, depressed or hopeless about the future? Past Month  with sleep trouble, such as bad dreams, sleeping restlessly, or falling asleep during the day? Past Month  with feeling very anxious, nervous, tense, scared, panicked or like something bad was going to happen?  2 - 12 months ago  with becoming very distressed and upset when something reminded you of the past?  2 - 12 months ago  with thinking about ending your life or committing suicide?  Never    When was the last time that you did the following things two or more times?    Lied or conned to get things you wanted or to avoid having to do something?   Past Month  Had a hard time paying attention at school, work or home? Never  Had a hard time listening to instructions at school, work or home?  Never  Were a bully or threatened other people?  Never  Started physical fights with other people?  Never    Have you ever been verbally, emotionally, physically or sexually abused?   No    Family history of substance use and misuse: \"Yes, my father had some brothers and sisters, daughter and two nephews who are all alcoholic\"    The patient's desire for family involvement in the treatment program: \"Yes, I " "would love to have daughter or one of my sister involved.\"  Level of family support: \"my daughter Meg, sister Otilia, and a couple of AA girlfriends.\"    Social network in relation to expected support for recovery: No, not recently.    Are you currently in a significant relationship? No    Do you have any children (include living arrangements/custody/contact)?:  One daughter who is 30 years old.    What is your current living situation? I rented an apartment where I live alone.    Are you employed/attending school? Yes, I am on a leave of abscense from my work.    SUMMARY:  Ability to understand written treatment materials: Yes  Ability to understand patient rules and patient rights: Yes  Does the patient recognize needs related to substance use and is willing to follow treatment recommendations: Yes  Does the patient have an opioid use disorder:  does not have a history of opiate use.    ASAM Dimension Scale Ratings:  Dimension 1: 0 Client displays full functioning with good ability to tolerate and cope with withdrawal discomfort. No signs or symptoms of intoxication or withdrawal or resolving signs or symptoms.  Dimension 2: 0 Client displays full functioning with good ability to cope with physical discomfort.  Dimension 3: 2 Client has difficulty with impulse control and lacks coping skills. Client has thoughts of suicide or harm to others without means; however, the thoughts may interfere with participation in some treatment activities. Client has difficulty functioning in significant life areas. Client has moderate symptoms of emotional, behavioral, or cognitive problems. Client is able to participate in most treatment activities.  Dimension 4: 2 Client displays verbal compliance, but lacks consistent behaviors; has low motivation for change; and is passively involved in treatment.  Dimension 5: 4 No awareness of the negative impact of mental health problems or substance abuse. No coping skills to arrest " mental health or addiction illnesses, or prevent relapse.  Dimension 6: 3 Client is not engaged in structured, meaningful activity and the client's peers, family, significant other, and living environment are unsupportive, or there is significant criminal justice system involvement.    Category of Substance Severity (ICD-10 Code / DSM 5 Code)     Alcohol Use Disorder Severe  (10.20) (303.90)   Cannabis Use Disorder The patient does not meet the criteria for a Cannabis use disorder.   Hallucinogen Use Disorder The patient does not meet the criteria for a Hallucinogen use disorder.   Inhalant Use Disorder The patient does not meet the criteria for an Inhalant use disorder.   Opioid Use Disorder The patient does not meet the criteria for an Opioid use disorder.   Sedative, Hypnotic, or Anxiolytic Use Disorder The patient does not meet the criteria for a Sedative/Hypnotic use disorder.   Stimulant Related Disorder The patient does not meet the criteria for a Stimulant use disorder.   Tobacco Use Disorder The patient does not meet the criteria for a Tobacco use disorder.   Other (or unknown) Substance Use Disorder The patient does not meet the criteria for a Other (or unknown) Substance use disorder.     A problematic pattern of alcohol/drug use leading to clinically significant impairment or distress, as manifested by at least two of the following, occurring within a 12-month period:    1.) Alcohol/drug is often taken in larger amounts or over a longer period than was intended.  2.) There is a persistent desire or unsuccessful efforts to cut down or control alcohol/drug use  3.) A great deal of time is spent in activities necessary to obtain alcohol, use alcohol, or recover from its effects.  4.) Craving, or a strong desire or urge to use alcohol/drug  5.) Recurrent alcohol/drug use resulting in a failure to fulfill major role obligations at work, school or home.  6.) Continued alcohol use despite having persistent or  recurrent social or interpersonal problems caused or exacerbated by the effects of alcohol/drug.  7.) Important social, occupational, or recreational activities are given up or reduced because of alcohol/drug use.  8.) Recurrent alcohol/drug use in situations in which it is physically hazardous.  9.) Alcohol/drug use is continued despite knowledge of having a persistent or recurrent physical or psychological problem that is likely to have been caused or exacerbated by alcohol.  10.) Tolerance, as defined by either of the following: A need for markedly increased amounts of alcohol/drug to achieve intoxication or desired effect.  11.) Withdrawal, as manifested by either of the following: The characteristic withdrawal syndrome for alcohol/drug (refer to Criteria A and B of the criteria set for alcohol/drug withdrawal).    Specify if: In early remission:  After full criteria for alcohol/drug use disorder were previously met, none of the criteria for alcohol/drug use disorder have been met for at least 3 months but for less than 12 months (with the exception that Criterion A4,  Craving or a strong desire or urge to use alcohol/drug  may be met).     In sustained remission:   After full criteria for alcohol use disorder were previously met, non of the criteria for alcohol/drug use disorder have been met at any time during a period of 12 months or longer (with the exception that Criterion A4,  Craving or strong desire or urge to use alcohol/drug  may be met).     Specify if:   This additional specifier is used if the individual is in an environment where access to alcohol is restricted.    Mild: Presence of 2-3 symptoms  Moderate: Presence of 4-5 symptoms  Severe: Presence of 6 or more symptoms    Collateral information: JACKIE Collateral Info: Sufficient information is obtained from the patient to support diagnosis and recommendations. Contact with a collateral sources is not required.    Recommendations: Complete a  residential based or similar treatment program, such as [facility].    Resources:  JACKIE Resources: [facility] [place]   (https://aam[place].org) and [facility], [address], [address] â   [place] [address]  / Phone: [phone] ([email or web address])    Daanes: Record has been saved! Assessment ID: [id number]    JACKIE consult completed by: MOLLY Hicks.  Phone Number: [phone]  E-mail Address: [email or web address]  [address] [place] [phone]

## 2021-03-03 NOTE — PLAN OF CARE
VS: VSS   O2: >90% on RA   Output: Voiding adequately in BR   Last BM: 3/2; +fl   Activity: WBAT; up with SBA with FWW. Generalized weakness   Up for meals? Yes   Skin: CDI   Pain: Denies   CMS: Intact   Dressing: None   Diet: Regular   LDA: L PIV infusing   Equipment: IV pole, FWW   Plan: TBD   Additional Info: Last MSSA 13 at 1630; tremors, diaphoresis, restlessness. 10mg valium administered.

## 2021-03-03 NOTE — PLAN OF CARE
VS:   /74 (BP Location: Right arm)   Pulse 64   Temp 96.8  F (36  C) (Oral)   Resp 16   Wt 79.4 kg (175 lb)   SpO2 97%   BMI 25.84 kg/m      Output:   Voiding in BR.  Is passing gas,last BM 3/2/21   Activity:   Up SBA1 and walker   Skin: Intact   Pain:   Headache and chronic L hip pain.Managed with tylenol and positioning.Reported she cancelled her upcoming hip surgery due to ETOH.   Neuro/CMS:   LLE intermittent numbness.Denies currently.   Dressing(s):   none   Diet:   regular   LDA:   PIV line L forearm.IV fluid running at 100ml/hr.   Equipment:   walker   Plan:   MSSA scored 6,10 and 8.Sweats/chills/tremors present though HR on 60's range,afebrile,denying any hallucination.Will continue monitor/treat withdrawal sx.   Additional Info:

## 2021-03-03 NOTE — PROGRESS NOTES
[facility], [place], [place]  Internal Medicine Progress Note    Assessment and Plans:     Shannon Beckwith is a 60 year old female with a hx of alcohol abuse, arthritis, depression with SI, hypothyroidism, and htn, who presented to the ED for detox and was found to be significantly acidotic with an anion gap due to starvation + alcoholic ketoacidosis.     Acute alcohol intoxication, alcoholic + starvation ketoacidosis chronic alcohol dependence, lactic acid 7.5 on admission, with 3.7 ketones in blood, drug abuse screen only positive for ethanol, Anion Gap of 23, with bicarb 16, AST 52, EtoH breath 0.28, hx of alcohol withdrawal- tremors (no SZ or DTs), no sx/suspicion of sepsis.    - MSSA protocol with valium  - Zofran prn  - multivit, folic acid, thiamine  - Consult CD     Hyponatremia On admit Na 130 => 139. Now resolved.     hypocalcemia  8 Ca  - monitor and rehydrate  - replace Ca     Depression hx of SI  - cont home fluoxetine 20 mg daily     Hypothyroidism  - cont home levothyroxine 175 mcg daily     HTN  - cont home atenolol 50mg qam and amlodipine 10 mg qpm with hold parameters     Diet:  reg  DVT Prophylaxis: Low Risk/Ambulatory with no VTE prophylaxis indicated  Almanza Catheter: not present  Code Status:  full       Disposition Plan     Expected discharge: tomorrow,  recommended to prior living arrangement once detoxed, lab abnormalities resolved.    Amanda Nguyen MD  Text Page  (7am - 5pm, M-F)    Subjective:      Overnight Events reviewed, Chart Reviewed  Improving, still not feeling that great.   Had some sweating. Tremors are better.   Wants to see CD     -Data reviewed today: I reviewed all new labs and imaging results over the last 24 hours.     Exam:     Temp: 97.2  F (36.2  C) Temp src: Oral BP: (!) 145/83 Pulse: 77   Resp: 18 SpO2: 100 % O2 Device: None (Room air)    Vitals:    03/02/21 0109   Weight: 79.4 kg (175 lb)      Vital Signs with Ranges  Temp:  [96  F (35.6  C)-98  F (36.7  C)] 97.2  F (36.2  C)  Pulse:  [58-77] 77  Resp:  [15-18] 18  BP: (116-145)/(68-91) 145/83  SpO2:  [97 %-100 %] 100 %  No intake/output data recorded.    Constitutional: No distress noted, Alert, Answering questions appropriately  Respiratory: AE is goog on both sides, no wheezing onr crackles  Cardiovascular: S1S2 normal, no new murmur  GI: Soft, non tender  Skin/Integumen: No rash    Medications     dextrose 5% and 0.9% NaCl 100 mL/hr at 03/03/21 0828       amLODIPine  2.5 mg Oral At Bedtime     atenolol  50 mg Oral Daily     FLUoxetine  20 mg Oral Daily     folic acid  1 mg Oral Daily     levothyroxine  175 mcg Oral Daily     multivitamin w/minerals  1 tablet Oral Daily     thiamine  100 mg Oral Daily     Data      Recent Labs   Lab 03/02/21  0638 03/01/21  2106   WBC  --  9.3   HGB  --  12.0   MCV  --  83   PLT  --  391    130*   POTASSIUM 4.4 4.6   CHLORIDE 105 91*   CO2 26 16*   BUN 7 11   CR 0.50* 0.65   ANIONGAP 8 23*   MARCELO 7.8* 8.0*   * 79   ALBUMIN 3.3* 4.0   PROTTOTAL 6.0* 7.3   BILITOTAL 0.9 0.7   ALKPHOS 104 126   ALT 34 32   AST 63* 52*     No results found for this or any previous visit (from the past 24 hour(s)).

## 2021-03-03 NOTE — PLAN OF CARE
VS: BP (!) 145/83 (BP Location: Left arm)   Pulse 77   Temp 97.2  F (36.2  C) (Oral)   Resp 18   Wt 79.4 kg (175 lb)   SpO2 100%   BMI 25.84 kg/m      Output: Voiding in  BR. Last BM today- 3/3/2021   Activity: SBA1, walker    Skin: Intact    Pain:   Denies    Neuro/CMS:   A&Ox4.    Dressing(s):   None    Diet:   Regular   LDA:   PIV line in left forearm. SL.    Equipment:   Walker    Plan:   Continue with POC   Additional Info:   MSSA 2 at 845 am

## 2021-03-04 ENCOUNTER — PATIENT OUTREACH (OUTPATIENT)
Dept: CARE COORDINATION | Facility: CLINIC | Age: 61
End: 2021-03-04

## 2021-03-04 VITALS
DIASTOLIC BLOOD PRESSURE: 103 MMHG | TEMPERATURE: 98.4 F | BODY MASS INDEX: 25.84 KG/M2 | WEIGHT: 175 LBS | OXYGEN SATURATION: 99 % | HEART RATE: 74 BPM | RESPIRATION RATE: 16 BRPM | SYSTOLIC BLOOD PRESSURE: 162 MMHG

## 2021-03-04 PROCEDURE — 250N000013 HC RX MED GY IP 250 OP 250 PS 637: Performed by: INTERNAL MEDICINE

## 2021-03-04 PROCEDURE — 99239 HOSP IP/OBS DSCHRG MGMT >30: CPT | Performed by: HOSPITALIST

## 2021-03-04 RX ORDER — LANOLIN ALCOHOL/MO/W.PET/CERES
100 CREAM (GRAM) TOPICAL DAILY
Qty: 30 TABLET | Refills: 0 | Status: SHIPPED | OUTPATIENT
Start: 2021-03-05 | End: 2021-04-04

## 2021-03-04 RX ORDER — UBIDECARENONE 30 MG
1 CAPSULE ORAL DAILY
Qty: 30 TABLET | Refills: 0 | Status: SHIPPED | OUTPATIENT
Start: 2021-03-04 | End: 2021-05-28

## 2021-03-04 RX ORDER — AMLODIPINE BESYLATE 10 MG/1
10 TABLET ORAL AT BEDTIME
Status: DISCONTINUED | OUTPATIENT
Start: 2021-03-04 | End: 2021-03-04 | Stop reason: HOSPADM

## 2021-03-04 RX ORDER — FOLIC ACID 1 MG/1
1 TABLET ORAL DAILY
Qty: 30 TABLET | Refills: 0 | Status: SHIPPED | OUTPATIENT
Start: 2021-03-05 | End: 2021-05-28

## 2021-03-04 RX ADMIN — ATENOLOL 50 MG: 25 TABLET ORAL at 07:44

## 2021-03-04 RX ADMIN — MULTIPLE VITAMINS W/ MINERALS TAB 1 TABLET: TAB at 07:44

## 2021-03-04 RX ADMIN — THIAMINE HCL TAB 100 MG 100 MG: 100 TAB at 07:43

## 2021-03-04 RX ADMIN — LEVOTHYROXINE SODIUM 175 MCG: 25 TABLET ORAL at 07:44

## 2021-03-04 RX ADMIN — FOLIC ACID 1 MG: 1 TABLET ORAL at 07:43

## 2021-03-04 RX ADMIN — FLUOXETINE 20 MG: 20 CAPSULE ORAL at 07:43

## 2021-03-04 NOTE — CONSULTS
Care Management Initial Consult    General Information  Assessment completed with: Patient, VM-chart review,    Type of CM/SW Visit: Initial Assessment    Primary Care Provider verified and updated as needed: Yes   Readmission within the last 30 days: no previous admission in last 30 days         Advance Care Planning: Advance Care Planning Reviewed: education/resources on health care directives provided          Communication Assessment  Patient's communication style: spoken language (English or Bilingual)    Hearing Difficulty or Deaf: no   Wear Glasses or Blind: yes    Cognitive  Cognitive/Neuro/Behavioral: WDL                      Living Environment:   People in home: alone     Current living Arrangements: apartment      Able to return to prior arrangements: yes       Family/Social Support:  Care provided by: self  Provides care for: no one  Marital Status:   Children          Description of Support System: Supportive, Involved. Pt reports that her adult dtr Meg Beckwith is supportive.  Pt is recently going through a divorce and is experiencing distress over this situation.  Pt plans to go to a grief/loss support group for people experiencing divorce.  Support Assessment: Adequate family and caregiver support    Current Resources:   Patient receiving home care services: No     Community Resources: Chemical Dependency Services, OP Mental Health.  Pt has a [facility] CC named Shannan - pt did not have contact information readily available but confirmed that she has regular contact with her CC.  Equipment currently used at home: none  Supplies currently used at home: None    Employment/Financial:  Employment Status: employed part-time        Financial Concerns: No concerns identified        Lifestyle & Psychosocial Needs:        Socioeconomic History     Marital status:      Spouse name: Not on file     Number of children: Not on file     Years of education: Not on file     Highest education level: Not on  file     Tobacco Use     Smoking status: Former Smoker     Years: 5.00     Types: Cigarettes     Start date: 1976     Quit date: 1980     Years since quittin.1   Substance and Sexual Activity     Alcohol use: No     Comment: since 2016     Drug use: No     Sexual activity: Not Currently     Partners: Male     Birth control/protection: Post-menopausal       Functional Status:  Prior to admission patient needed assistance:  Pt was ind with ADLs/IADLs PTA    Mental Health Status:  Mental Health Status: Current Concern. Pt is diagnosed with depression and works with therapist Reji Callejas at [facility] in [facility], ph: [phone].  Pt is also seeking admission to the intensive outpatient MI/CD program through [facility] in [place], see below.    Chemical Dependency Status:  Chemical Dependency Status: Current Concern. Pt has a history of alcohol abuse - pt had a CD assessment completed by  on 3/3, see note.  Pt is seeking admission to IOP with [facility].  See below.          Values/Beliefs:  Spiritual, Cultural Beliefs, Yazidi Practices, Values that affect care: no             Additional Information:  Per team rounds, pt is medically ready to discharge today.   ELTON met with pt at bedside to complete initial consult.  Pt was assessed by  CD  and was recommended for inpatient treatment and referred to [facility].  However, pt stated that she's been communicating with [facility] in [place] and would like to admit to their intensive outpatient program which offers dual diagnosis MI/CD treatment 5 days per week for 3 hours per day.  ELTON agreed to assist with coordinating admission to their IOP.  ELTON obtained an MICHELE and faxed the assessment from 3/3 to [facility], contact information below.    ELTON left VM to Martha Miles at [facility] (contact below), and requested a call back to coordinate discharge.     ADDEND 1018:  ELTON spoke with Martha who requested the assessment to sent via  email because the fax cut off.  She clarified that the IOP is only 3 days a week, but that clients are able to see an individual LADC and psychologist on their off days, so they could come in 5 days per week.  Martha and ELTON discussed pt's natural supports (dtr, grief group, [facility] CC).  Martha will review assessment and update SW if they are going to accept for admission.     ADDEND 1150: ELTON spoke with pt and relayed that she is waiting to hear back from Martha.  Pt is going to discharge and will follow up with [facility] for treatment.     [facility]  Martha Miles  Ph: [phone]  Fax: [phone]  Main office: [phone]    GAIL Arias  [facility]  5 Ortho & 8A   Ph: [phone]  Pager: [phone]

## 2021-03-04 NOTE — PLAN OF CARE
Alert and oriented X 4. Able to make needs known. Call light in reach. VSS. MSSA 1, no Valium needed since 3/2.  Denies pain. Up ad nicolette, independent with cares. Continent of bowel and bladder.  Appears to be sleeping during rounds. Continue with plan of care.

## 2021-03-04 NOTE — PLAN OF CARE
Patient A & O x4. VSS. Pt up IND in the room with walker. Lung sounds . Patient denies chest pain, SOB, lightheadedness, dizziness, tingling, numbness, nausea, and vomiting. Bowel sounds active, last BM 3/3, passing flatus, and tolerating regular diet. Drinking well and voiding spontaneously without difficulties. PIV SL. Denies pain. Plan is to discharge to CD tx possibly lodging plus tomorrow if bed available. Pt would like SW to fax or email CD assessment to [facility] as a back up plan if lodging plus is full. Phone # in front of chart and in sticky note. Patient demonstrates ability to use call light appropriately. Will continue to monitor patient.

## 2021-03-04 NOTE — DISCHARGE SUMMARY
[facility]  Discharge Summary    Shannon Beckwith MRN# [id number]   YOB: 1960 Age: 60 year old     Date of Admission:  3/1/2021  Date of Discharge:  03/04/2021.  Admitting Physician:  eTjas Rossi MD  Discharge Physician:  Amanda Nguyen MD  Discharging Service:  Internal Medicine     Primary Provider: Victor Hugo Odonnell              Discharge Diagnosis:     Primary Discharge Diagnosis:    # Acute alcohol intoxication, alcoholic + starvation ketoacidosis chronic alcohol dependence,   # Lactic acidosis with LA level of 7.5 on admission, with 3.7 ketones in blood,  # Hyponatremia On admit Na 130 => 139. Now resolved.   # Hypocalcemia  8 Ca  # Depression hx of SI  # Hypothyroidism  # HTN      Past Medical History:   Diagnosis Date     Arthritis      Chemical dependency (H) sober since July 2016 per PCP, multiple prior inpatient hospital stays for EtOH abuse     Depression      Dyspnea on exertion      History of blood transfusion      Hypertension pre hypertensive     Obese     eating disorder     Problem, psychiatric     Alcohlic     Sleep apnea 2000    Not a problem since weight loss surgery      Thyroid disease      Tracheal stenosis                     Discharge Medications:     Current Discharge Medication List      START taking these medications    Details   folic acid (FOLVITE) 1 MG tablet Take 1 tablet (1 mg) by mouth daily  Qty: 30 tablet, Refills: 0    Associated Diagnoses: Alcoholic ketoacidosis      thiamine (B-1) 100 MG tablet Take 1 tablet (100 mg) by mouth daily  Qty: 30 tablet, Refills: 0    Associated Diagnoses: Alcoholic ketoacidosis         CONTINUE these medications which have CHANGED    Details   Multiple Vitamins-Minerals (MULTI FOR HER) TABS Take 1 Tablespoonful by mouth daily  Qty: 30 tablet, Refills: 0    Associated Diagnoses: Alcoholic ketoacidosis         CONTINUE these medications which have NOT CHANGED    Details   amLODIPine (NORVASC) 10 MG tablet Take  10 mg by mouth At Bedtime      atenolol (TENORMIN) 50 MG tablet Take 50 mg by mouth daily      FLUoxetine (PROZAC) 20 MG capsule Take 20 mg by mouth daily      levothyroxine (SYNTHROID/LEVOTHROID) 175 MCG tablet Take 175 mcg by mouth daily         STOP taking these medications       ibuprofen (ADVIL/MOTRIN) 600 MG tablet Comments:   Reason for Stopping:                    Hospital Course:     Shannon Beckwith is a 60 year old female with a hx of alcohol abuse, arthritis, depression with SI, hypothyroidism, and htn, who presented to the ED for detox and was found to be significantly acidotic with an anion gap due to starvation + alcoholic ketoacidosis.     # Acute alcohol intoxication, alcoholic + starvation ketoacidosis chronic alcohol dependence, lactic acid 7.5 on admission, with 3.7 ketones in blood, drug abuse screen only positive for ethanol, Anion Gap of 23, with bicarb 16, AST 52, EtoH breath 0.28, hx of alcohol withdrawal- tremors (no SZ or DTs), no sx/suspicion of sepsis. She got better with IVF and ON MSSA protocol with valium. Withdrawal has resolved. She was seen by CD. Assessment done. She want to pursue out put CD treatment. She has a bed lined up at [facility] in [place], [place]. She will go there on Monday. If somehow that does not work, she will plus lodging plus [facility].     # Hyponatremia On admit Na 130 => 139. Now resolved.     # Hypocalcemia  8 Ca. No sympts noted.   # Depression hx of SI  - cont home fluoxetine 20 mg daily     # Hypothyroidism  - cont home levothyroxine 175 mcg daily     # HTN  - cont home atenolol 50mg qam and amlodipine 10 mg qpm with hold parameters     Diet:  reg  DVT Prophylaxis: Low Risk/Ambulatory with no VTE prophylaxis indicated  Almanza Catheter: not present  Code Status:  full              Discharge Disposition:   Discharged to home           Condition on Discharge:   Discharge condition: Stable   Code status on discharge: Full Code           Procedures:   none        Consultations:   Consultation during this admission received from CD.               Final Day of Progress before Discharge:       Physical Exam:  Blood pressure (!) 162/103, pulse 74, temperature 98.4  F (36.9  C), temperature source Oral, resp. rate 16, weight 79.4 kg (175 lb), SpO2 99 %.  GENERAL: Alert and oriented x 3. NAD.   HEENT: Anicteric sclera. PERRL. Mucous membranes moist and without lesions.   CV: RRR. S1, S2. No murmurs appreciated.   RESPIRATORY: Effort normal. Lungs CTAB with no wheezing, rales, rhonchi.   GI: Abdomen soft and non distended with normoactive bowel sounds present in all quadrants. No tenderness, rebound, guarding.      Data:  All laboratory data reviewed             Significant Results:     Results for orders placed or performed during the hospital encounter of 03/01/21   CT Head w/o Contrast     Status: None    Narrative    EXAM: CT HEAD W/O CONTRAST  LOCATION: [facility]  DATE/TIME: 3/1/2021 11:01 PM    INDICATION: Headache  COMPARISON: None.  TECHNIQUE: Routine CT Head without IV contrast. Multiplanar reformats. Dose reduction techniques were used.    FINDINGS:  INTRACRANIAL CONTENTS: No intracranial hemorrhage, extraaxial collection, or mass effect.  No CT evidence of acute infarct. Mild presumed chronic small vessel ischemic changes. Normal ventricles and sulci.     VISUALIZED ORBITS/SINUSES/MASTOIDS: No intraorbital abnormality. No paranasal sinus mucosal disease. No middle ear or mastoid effusion.    BONES/SOFT TISSUES: No acute abnormality.      Impression    IMPRESSION:  1.  No acute intracranial process.   CBC with platelets differential     Status: Abnormal   Result Value Ref Range    WBC 9.3 4.0 - 11.0 10e9/L    RBC Count 4.39 3.8 - 5.2 10e12/L    Hemoglobin 12.0 11.7 - 15.7 g/dL    Hematocrit 36.5 35.0 - 47.0 %    MCV 83 78 - 100 fl    MCH 27.3 26.5 - 33.0 pg    MCHC 32.9 31.5 - 36.5 g/dL    RDW 19.3 (H) 10.0 - 15.0 %    Platelet Count 391 150 - 450 10e9/L     Diff Method Automated Method     % Neutrophils 77.3 %    % Lymphocytes 13.5 %    % Monocytes 7.3 %    % Eosinophils 0.5 %    % Basophils 1.2 %    % Immature Granulocytes 0.2 %    Nucleated RBCs 0 0 /100    Absolute Neutrophil 7.2 1.6 - 8.3 10e9/L    Absolute Lymphocytes 1.3 0.8 - 5.3 10e9/L    Absolute Monocytes 0.7 0.0 - 1.3 10e9/L    Absolute Eosinophils 0.1 0.0 - 0.7 10e9/L    Absolute Basophils 0.1 0.0 - 0.2 10e9/L    Abs Immature Granulocytes 0.0 0 - 0.4 10e9/L    Absolute Nucleated RBC 0.0    Comprehensive metabolic panel     Status: Abnormal   Result Value Ref Range    Sodium 130 (L) 133 - 144 mmol/L    Potassium 4.6 3.4 - 5.3 mmol/L    Chloride 91 (L) 94 - 109 mmol/L    Carbon Dioxide 16 (L) 20 - 32 mmol/L    Anion Gap 23 (H) 3 - 14 mmol/L    Glucose 79 70 - 99 mg/dL    Urea Nitrogen 11 7 - 30 mg/dL    Creatinine 0.65 0.52 - 1.04 mg/dL    GFR Estimate >90 >60 mL/min/[1.73_m2]    GFR Estimate If Black >90 >60 mL/min/[1.73_m2]    Calcium 8.0 (L) 8.5 - 10.1 mg/dL    Bilirubin Total 0.7 0.2 - 1.3 mg/dL    Albumin 4.0 3.4 - 5.0 g/dL    Protein Total 7.3 6.8 - 8.8 g/dL    Alkaline Phosphatase 126 40 - 150 U/L    ALT 32 0 - 50 U/L    AST 52 (H) 0 - 45 U/L   Drug abuse screen 6 urine (chem dep)     Status: Abnormal   Result Value Ref Range    Amphetamine Qual Urine Negative NEG^Negative    Barbiturates Qual Urine Negative NEG^Negative    Benzodiazepine Qual Urine Negative NEG^Negative    Cannabinoids Qual Urine Negative NEG^Negative    Cocaine Qual Urine Negative NEG^Negative    Ethanol Qual Urine Positive (A) NEG^Negative    Opiates Qualitative Urine Negative NEG^Negative   Magnesium     Status: None   Result Value Ref Range    Magnesium 1.7 1.6 - 2.3 mg/dL   Asymptomatic SARS-CoV-2 COVID-19 Virus (Coronavirus) by PCR     Status: None    Specimen: Nasopharyngeal   Result Value Ref Range    SARS-CoV-2 Virus Specimen Source Nasopharyngeal     SARS-CoV-2 PCR Result NEGATIVE     SARS-CoV-2 PCR Comment (Note)    Lactic  acid     Status: Abnormal   Result Value Ref Range    Lactic Acid 7.5 (HH) 0.7 - 2.0 mmol/L   Ketone Beta-Hydroxybutyrate Quantitative     Status: Abnormal   Result Value Ref Range    Ketone Quantitative 3.7 (HH) 0.0 - 0.6 mmol/L   Lactic acid     Status: Abnormal   Result Value Ref Range    Lactic Acid 4.0 (HH) 0.7 - 2.0 mmol/L   Comprehensive metabolic panel     Status: Abnormal   Result Value Ref Range    Sodium 139 133 - 144 mmol/L    Potassium 4.4 3.4 - 5.3 mmol/L    Chloride 105 94 - 109 mmol/L    Carbon Dioxide 26 20 - 32 mmol/L    Anion Gap 8 3 - 14 mmol/L    Glucose 106 (H) 70 - 99 mg/dL    Urea Nitrogen 7 7 - 30 mg/dL    Creatinine 0.50 (L) 0.52 - 1.04 mg/dL    GFR Estimate >90 >60 mL/min/[1.73_m2]    GFR Estimate If Black >90 >60 mL/min/[1.73_m2]    Calcium 7.8 (L) 8.5 - 10.1 mg/dL    Bilirubin Total 0.9 0.2 - 1.3 mg/dL    Albumin 3.3 (L) 3.4 - 5.0 g/dL    Protein Total 6.0 (L) 6.8 - 8.8 g/dL    Alkaline Phosphatase 104 40 - 150 U/L    ALT 34 0 - 50 U/L    AST 63 (H) 0 - 45 U/L   Lactic acid whole blood     Status: Abnormal   Result Value Ref Range    Lactic Acid 2.1 (H) 0.7 - 2.0 mmol/L   Chemical Dependency IP Consult     Status: None ()    Cale Moreira LADC     3/3/2021 11:58 AM  IP Consult completed and patient was referred to [facility] today.    Care Management / Social Work IP Consult     Status: None ()    Gail Meneses, MSW     3/4/2021 11:51 AM  Care Management Initial Consult    General Information  Assessment completed with: Patient, VM-chart review,    Type of CM/SW Visit: Initial Assessment    Primary Care Provider verified and updated as needed: Yes   Readmission within the last 30 days: no previous admission in   last 30 days         Advance Care Planning: Advance Care Planning Reviewed:   education/resources on health care directives provided          Communication Assessment  Patient's communication style: spoken language (English or   Bilingual)    Hearing  Difficulty or Deaf: no   Wear Glasses or Blind: yes    Cognitive  Cognitive/Neuro/Behavioral: WDL                      Living Environment:   People in home: alone     Current living Arrangements: apartment      Able to return to prior arrangements: yes       Family/Social Support:  Care provided by: self  Provides care for: no one  Marital Status:   Children          Description of Support System: Supportive, Involved. Pt reports   that her adult dtr Meg Beckwith is supportive.  Pt is recently   going through a divorce and is experiencing distress over this   situation.  Pt plans to go to a grief/loss support group for   people experiencing divorce.  Support Assessment: Adequate family and caregiver support    Current Resources:   Patient receiving home care services: No     Community Resources: Chemical Dependency Services, OP Mental   Health.  Pt has a [facility] CC named Shannan - pt did not have contact   information readily available but confirmed that she has regular   contact with her CC.  Equipment currently used at home: none  Supplies currently used at home: None    Employment/Financial:  Employment Status: employed part-time        Financial Concerns: No concerns identified        Lifestyle & Psychosocial Needs:        Socioeconomic History     Marital status:      Spouse name: Not on file     Number of children: Not on file     Years of education: Not on file     Highest education level: Not on file     Tobacco Use     Smoking status: Former Smoker     Years: 5.00     Types: Cigarettes     Start date: 1976     Quit date: 1980     Years since quittin.1   Substance and Sexual Activity     Alcohol use: No     Comment: since 2016     Drug use: No     Sexual activity: Not Currently     Partners: Male     Birth control/protection: Post-menopausal       Functional Status:  Prior to admission patient needed assistance:  Pt was ind with   ADLs/IADLs PTA    Mental Health Status:  Mental  Health Status: Current Concern. Pt is diagnosed with   depression and works with therapist Reji Callejas at [facility] in   [facility], ph: [phone].  Pt is also seeking admission to   the intensive outpatient MI/CD program through [facility] in   [place], see below.    Chemical Dependency Status:  Chemical Dependency Status: Current Concern. Pt has a history of   alcohol abuse - pt had a CD assessment completed by  on 3/3,   see note.  Pt is seeking admission to IOP with [facility].    See below.          Values/Beliefs:  Spiritual, Cultural Beliefs, Voodoo Practices, Values that   affect care: no             Additional Information:  Per team rounds, pt is medically ready to discharge today.   ELTON met with pt at bedside to complete initial consult.  Pt was   assessed by  CD  and was recommended for inpatient   treatment and referred to [facility].  However, pt stated that   she's been communicating with [facility] in [place] and would   like to admit to their intensive outpatient program which offers   dual diagnosis MI/CD treatment 5 days per week for 3 hours per   day.  ELTON agreed to assist with coordinating admission to their   IOP.  ELTON obtained an MICHELE and faxed the assessment from 3/3 to   [facility], contact information below.    SW left VM to Martha Miles at [facility] (contact below), and   requested a call back to coordinate discharge.     ADDEND 1018:  ELTON spoke with Martha who requested the assessment to   sent via email because the fax cut off.  She clarified that the   IOP is only 3 days a week, but that clients are able to see an   individual LADC and psychologist on their off days, so they could   come in 5 days per week.  Martha and ELTON discussed pt's natural   supports (dtr, grief group, [facility] CC).  Martha will review   assessment and update ELTON if they are going to accept for   admission.     ADDEND 1150: ELTON spoke with pt and relayed that she is waiting to   hear back from  Martha.  Pt is going to discharge and will follow   up with [facility] for treatment.     [facility]  Martha Miles  Ph: [phone]  Fax: [phone]  Main office: [phone]    [facility]  5 Ortho & 8A   Ph: [phone]  Pager: [phone]       Alcohol breath test POCT     Status: Abnormal   Result Value Ref Range    Alcohol Breath Test 0.28 (A) 0.00 - 0.01      No results found for this or any previous visit (from the past 48 hour(s)).             Pending Results:   Unresulted Labs Ordered in the Past 30 Days of this Admission     No orders found from 1/30/2021 to 3/2/2021.                  Discharge Instructions and Follow-Up:     Discharge Procedure Orders   Reason for your hospital stay   Order Comments: Alcohol withdrawl     Adult [facility] Follow-up and recommended labs and tests   Order Comments: Follow up with primary care provider, Victor Hugo Odonnell, within 7 days for hospital follow- up.  No follow up labs or test are needed.      Appointments on Tad and/or [facility] (with [facility] or [facility] provider or service). Call [phone] if you haven't heard regarding these appointments within 7 days of discharge.     Activity   Order Comments: Your activity upon discharge: activity as tolerated     Order Specific Question Answer Comments   Is discharge order? Yes      Diet   Order Comments: Follow this diet upon discharge: Orders Placed This Encounter      Combination Diet Regular Diet Adult     Order Specific Question Answer Comments   Is discharge order? Yes             Amanda Nguyen MD  Internal Medicine Staff Hospitalist  [phone]  March 4, 2021        Time spent on patient: 35 minutes total including face to face and coordinating care time reviewing current illness, any medication changes, and the care plan for today.

## 2021-03-04 NOTE — PLAN OF CARE
Pt discharged ambulatory with medications and instructions and personal belongings. Walked to [employer]. She is calling for a ride.

## 2021-03-05 NOTE — PROGRESS NOTES
Attempted to contact the patient x2   for post-hospital call without answer. Will close encounter at this time.    Heidi Hughes CMA  Post Hospital Discharge Team

## 2021-05-12 ENCOUNTER — HOSPITAL ENCOUNTER (OUTPATIENT)
Facility: CLINIC | Age: 61
End: 2021-05-12
Attending: ORTHOPAEDIC SURGERY | Admitting: ORTHOPAEDIC SURGERY
Payer: COMMERCIAL

## 2021-05-12 DIAGNOSIS — Z98.890 POST-OPERATIVE STATE: Primary | ICD-10-CM

## 2021-05-14 DIAGNOSIS — Z11.59 ENCOUNTER FOR SCREENING FOR OTHER VIRAL DISEASES: ICD-10-CM

## 2021-05-26 RX ORDER — TRANEXAMIC ACID 650 MG/1
1950 TABLET ORAL ONCE
Status: CANCELLED | OUTPATIENT
Start: 2021-05-26 | End: 2021-05-26

## 2021-05-26 RX ORDER — CEFAZOLIN SODIUM 2 G/100ML
2 INJECTION, SOLUTION INTRAVENOUS SEE ADMIN INSTRUCTIONS
Status: CANCELLED | OUTPATIENT
Start: 2021-05-26

## 2021-05-26 RX ORDER — CELECOXIB 200 MG/1
400 CAPSULE ORAL ONCE
Status: CANCELLED | OUTPATIENT
Start: 2021-05-26 | End: 2021-05-26

## 2021-05-26 RX ORDER — CEFAZOLIN SODIUM 2 G/100ML
2 INJECTION, SOLUTION INTRAVENOUS
Status: CANCELLED | OUTPATIENT
Start: 2021-05-26

## 2021-05-26 RX ORDER — ACETAMINOPHEN 325 MG/1
975 TABLET ORAL ONCE
Status: CANCELLED | OUTPATIENT
Start: 2021-05-26 | End: 2021-05-26

## 2021-05-28 RX ORDER — HYDROXYZINE HYDROCHLORIDE 25 MG/1
25 TABLET, FILM COATED ORAL 3 TIMES DAILY PRN
COMMUNITY
End: 2021-06-01 | Stop reason: HOSPADM

## 2021-05-28 RX ORDER — IBUPROFEN 200 MG
200 TABLET ORAL 3 TIMES DAILY PRN
COMMUNITY
End: 2021-06-01 | Stop reason: HOSPADM

## 2021-05-28 RX ORDER — MULTIVITAMIN,THERAPEUTIC
1 TABLET ORAL DAILY
COMMUNITY
End: 2021-06-01 | Stop reason: HOSPADM

## 2021-05-28 NOTE — PROGRESS NOTES
PTA medications updated by Medication Scribe prior to surgery via phone call with patient (last doses completed by Nurse)     Medication history sources: Patient, Surescripts, H&P and Patient's home med list  In the past week, patient estimated taking medication this percent of the time: Greater than 90%  Adherence assessment: N/A Not Observed    Significant changes made to the medication list:  Patient reports no longer taking the following meds (med scribe removed from PTA med list): Cyclobenzaprine (flexeril)      Additional medication history information:   None    Medication reconciliation completed by provider prior to medication history? No    Time spent in this activity: 30 MINUTES    The information provided in this note is only as accurate as the sources available at the time of update(s)      Prior to Admission medications    Medication Sig Last Dose Taking? Auth Provider   amLODIPine (NORVASC) 10 MG tablet Take 10 mg by mouth At Bedtime  at PM Yes Unknown, Entered By History   atenolol (TENORMIN) 50 MG tablet Take 50 mg by mouth every morning   at AM Yes Unknown, Entered By History   Ferrous Sulfate (IRON PO) Take 1 tablet by mouth daily  Yes Reported, Patient   FLUoxetine (PROZAC) 20 MG capsule Take 20 mg by mouth daily (with lunch)   Yes Unknown, Entered By History   hydrOXYzine (ATARAX) 25 MG tablet Take 25 mg by mouth 3 times daily as needed for itching > 2 MONTHS at PRN Yes Reported, Patient   ibuprofen (ADVIL/MOTRIN) 200 MG tablet Take 200 mg by mouth 3 times daily as needed for mild pain 5/28/2021 Yes Reported, Patient   levothyroxine (SYNTHROID/LEVOTHROID) 175 MCG tablet Take 175 mcg by mouth daily  Yes Unknown, Entered By History   multivitamin, therapeutic (THERA-VIT) TABS tablet Take 1 tablet by mouth daily  Yes Reported, Patient   UNKNOWN TO PATIENT Take 2 tablets by mouth daily as needed (1 hour prior to dental visits) Oral Antibioitics 5/3/2021 at PRN Yes Reported, Patient   VITAMIN D PO  Take 1 tablet by mouth daily  Yes Reported, Patient

## 2021-05-30 RX ORDER — LIDOCAINE 40 MG/G
CREAM TOPICAL
Status: CANCELLED | OUTPATIENT
Start: 2021-05-30

## 2021-05-30 RX ORDER — SODIUM CHLORIDE, SODIUM LACTATE, POTASSIUM CHLORIDE, CALCIUM CHLORIDE 600; 310; 30; 20 MG/100ML; MG/100ML; MG/100ML; MG/100ML
INJECTION, SOLUTION INTRAVENOUS CONTINUOUS
Status: CANCELLED | OUTPATIENT
Start: 2021-05-30

## 2021-05-31 ENCOUNTER — ANESTHESIA EVENT (OUTPATIENT)
Dept: SURGERY | Facility: CLINIC | Age: 61
End: 2021-05-31

## 2021-05-31 RX ORDER — CELECOXIB 100 MG/1
100 CAPSULE ORAL 2 TIMES DAILY
Status: CANCELLED | OUTPATIENT
Start: 2021-05-31 | End: 2021-06-02

## 2021-05-31 RX ORDER — CEFAZOLIN SODIUM 1 G/3ML
1 INJECTION, POWDER, FOR SOLUTION INTRAMUSCULAR; INTRAVENOUS EVERY 8 HOURS
Status: CANCELLED | OUTPATIENT
Start: 2021-05-31 | End: 2021-06-01

## 2021-05-31 RX ORDER — LIDOCAINE 40 MG/G
CREAM TOPICAL
Status: CANCELLED | OUTPATIENT
Start: 2021-05-31

## 2021-05-31 RX ORDER — ASPIRIN 81 MG/1
162 TABLET ORAL DAILY
Qty: 120 TABLET | Refills: 0 | Status: CANCELLED | OUTPATIENT
Start: 2021-05-31

## 2021-05-31 RX ORDER — ASPIRIN 81 MG/1
162 TABLET ORAL DAILY
Status: CANCELLED | OUTPATIENT
Start: 2021-05-31

## 2021-05-31 RX ORDER — OXYCODONE HYDROCHLORIDE 5 MG/1
10 TABLET ORAL EVERY 4 HOURS PRN
Status: CANCELLED | OUTPATIENT
Start: 2021-05-31

## 2021-05-31 RX ORDER — ACETAMINOPHEN 325 MG/1
650 TABLET ORAL EVERY 4 HOURS PRN
Qty: 100 TABLET | Refills: 0 | Status: CANCELLED | OUTPATIENT
Start: 2021-05-31

## 2021-05-31 RX ORDER — SODIUM CHLORIDE, SODIUM LACTATE, POTASSIUM CHLORIDE, CALCIUM CHLORIDE 600; 310; 30; 20 MG/100ML; MG/100ML; MG/100ML; MG/100ML
INJECTION, SOLUTION INTRAVENOUS CONTINUOUS
Status: CANCELLED | OUTPATIENT
Start: 2021-05-31

## 2021-05-31 RX ORDER — HYDROXYZINE HYDROCHLORIDE 25 MG/1
25 TABLET, FILM COATED ORAL EVERY 6 HOURS PRN
Status: CANCELLED | OUTPATIENT
Start: 2021-05-31

## 2021-05-31 RX ORDER — OXYCODONE HYDROCHLORIDE 5 MG/1
5 TABLET ORAL EVERY 4 HOURS PRN
Status: CANCELLED | OUTPATIENT
Start: 2021-05-31

## 2021-05-31 RX ORDER — CELECOXIB 200 MG/1
200 CAPSULE ORAL DAILY
Qty: 40 CAPSULE | Refills: 0 | Status: CANCELLED | OUTPATIENT
Start: 2021-05-31 | End: 2021-07-10

## 2021-05-31 RX ORDER — BISACODYL 10 MG
10 SUPPOSITORY, RECTAL RECTAL DAILY PRN
Status: CANCELLED | OUTPATIENT
Start: 2021-05-31

## 2021-05-31 RX ORDER — HYDROXYZINE HYDROCHLORIDE 25 MG/1
25 TABLET, FILM COATED ORAL EVERY 6 HOURS PRN
Qty: 30 TABLET | Refills: 0 | Status: CANCELLED | OUTPATIENT
Start: 2021-05-31

## 2021-05-31 RX ORDER — PROCHLORPERAZINE MALEATE 10 MG
10 TABLET ORAL EVERY 6 HOURS PRN
Status: CANCELLED | OUTPATIENT
Start: 2021-05-31

## 2021-05-31 RX ORDER — AMOXICILLIN 250 MG
1-2 CAPSULE ORAL 2 TIMES DAILY
Qty: 100 TABLET | Refills: 0 | Status: CANCELLED | OUTPATIENT
Start: 2021-05-31

## 2021-05-31 RX ORDER — HYDROMORPHONE HYDROCHLORIDE 1 MG/ML
0.4 INJECTION, SOLUTION INTRAMUSCULAR; INTRAVENOUS; SUBCUTANEOUS
Status: CANCELLED | OUTPATIENT
Start: 2021-05-31

## 2021-05-31 RX ORDER — HYDROMORPHONE HYDROCHLORIDE 1 MG/ML
0.2 INJECTION, SOLUTION INTRAMUSCULAR; INTRAVENOUS; SUBCUTANEOUS
Status: CANCELLED | OUTPATIENT
Start: 2021-05-31

## 2021-05-31 RX ORDER — POLYETHYLENE GLYCOL 3350 17 G/17G
17 POWDER, FOR SOLUTION ORAL DAILY
Status: CANCELLED | OUTPATIENT
Start: 2021-06-01

## 2021-05-31 RX ORDER — AMOXICILLIN 250 MG
1 CAPSULE ORAL 2 TIMES DAILY
Status: CANCELLED | OUTPATIENT
Start: 2021-05-31

## 2021-05-31 RX ORDER — ACETAMINOPHEN 325 MG/1
975 TABLET ORAL EVERY 8 HOURS
Status: CANCELLED | OUTPATIENT
Start: 2021-05-31 | End: 2021-06-03

## 2021-05-31 RX ORDER — ONDANSETRON 2 MG/ML
4 INJECTION INTRAMUSCULAR; INTRAVENOUS EVERY 6 HOURS PRN
Status: CANCELLED | OUTPATIENT
Start: 2021-05-31

## 2021-05-31 RX ORDER — DOCUSATE SODIUM 100 MG/1
100 CAPSULE, LIQUID FILLED ORAL 2 TIMES DAILY
Status: CANCELLED | OUTPATIENT
Start: 2021-05-31

## 2021-05-31 RX ORDER — DIPHENHYDRAMINE HCL 25 MG
25 CAPSULE ORAL EVERY 6 HOURS PRN
Status: CANCELLED | OUTPATIENT
Start: 2021-05-31

## 2021-05-31 RX ORDER — ONDANSETRON 4 MG/1
4 TABLET, ORALLY DISINTEGRATING ORAL EVERY 6 HOURS PRN
Status: CANCELLED | OUTPATIENT
Start: 2021-05-31

## 2021-05-31 RX ORDER — OXYCODONE HYDROCHLORIDE 5 MG/1
5-10 TABLET ORAL
Qty: 50 TABLET | Refills: 0 | Status: CANCELLED | OUTPATIENT
Start: 2021-05-31

## 2021-06-01 ENCOUNTER — ANESTHESIA (OUTPATIENT)
Dept: SURGERY | Facility: CLINIC | Age: 61
End: 2021-06-01

## 2021-06-15 ENCOUNTER — HOSPITAL ENCOUNTER (OUTPATIENT)
Facility: CLINIC | Age: 61
End: 2021-06-15
Attending: ORTHOPAEDIC SURGERY | Admitting: ORTHOPAEDIC SURGERY
Payer: COMMERCIAL

## 2021-06-18 ENCOUNTER — TRANSFERRED RECORDS (OUTPATIENT)
Dept: HEALTH INFORMATION MANAGEMENT | Facility: CLINIC | Age: 61
End: 2021-06-18

## 2021-06-18 NOTE — DISCHARGE INSTRUCTIONS
**If you have questions or concerns about your procedure, please contact Dr Phil Carrera [phone].**

## 2021-06-23 RX ORDER — CEFAZOLIN SODIUM 2 G/100ML
2 INJECTION, SOLUTION INTRAVENOUS SEE ADMIN INSTRUCTIONS
Status: CANCELLED | OUTPATIENT
Start: 2021-06-23

## 2021-06-23 RX ORDER — TRANEXAMIC ACID 650 MG/1
1950 TABLET ORAL ONCE
Status: CANCELLED | OUTPATIENT
Start: 2021-06-23 | End: 2021-06-23

## 2021-06-23 RX ORDER — CEFAZOLIN SODIUM 2 G/100ML
2 INJECTION, SOLUTION INTRAVENOUS
Status: CANCELLED | OUTPATIENT
Start: 2021-06-23

## 2021-06-23 RX ORDER — CELECOXIB 200 MG/1
400 CAPSULE ORAL ONCE
Status: CANCELLED | OUTPATIENT
Start: 2021-06-23 | End: 2021-06-23

## 2021-06-23 RX ORDER — ACETAMINOPHEN 325 MG/1
975 TABLET ORAL ONCE
Status: CANCELLED | OUTPATIENT
Start: 2021-06-23 | End: 2021-06-23

## 2021-06-28 RX ORDER — IBUPROFEN 200 MG
200 TABLET ORAL 3 TIMES DAILY PRN
COMMUNITY
End: 2021-06-28 | Stop reason: HOSPADM

## 2021-06-28 RX ORDER — ACETAMINOPHEN 325 MG/1
325-650 TABLET ORAL 4 TIMES DAILY PRN
COMMUNITY
End: 2021-06-28 | Stop reason: HOSPADM

## 2021-06-28 RX ORDER — FERROUS SULFATE 325(65) MG
325 TABLET ORAL 2 TIMES DAILY
COMMUNITY
End: 2021-06-28 | Stop reason: HOSPADM

## 2021-06-28 RX ORDER — AMOXICILLIN 500 MG/1
2000 TABLET, FILM COATED ORAL DAILY PRN
COMMUNITY
End: 2021-06-28 | Stop reason: HOSPADM

## 2021-06-28 RX ORDER — CYCLOBENZAPRINE HCL 10 MG
10 TABLET ORAL
COMMUNITY
End: 2021-06-28 | Stop reason: HOSPADM

## 2021-06-28 RX ORDER — HYDROXYZINE HYDROCHLORIDE 25 MG/1
25 TABLET, FILM COATED ORAL 3 TIMES DAILY PRN
COMMUNITY
End: 2021-06-28 | Stop reason: HOSPADM

## 2021-06-28 NOTE — PROGRESS NOTES
PTA medications updated by Medication Scribe prior to surgery via phone call with patient (last doses completed by Nurse)     Medication history sources: Patient, Surescripts and H&P  In the past week, patient estimated taking medication this percent of the time: Greater than 90%  Adherence assessment: N/A Not Observed    Significant changes made to the medication list:  None      Additional medication history information:   None    Medication reconciliation completed by provider prior to medication history? No    Time spent in this activity: 20 minutes    The information provided in this note is only as accurate as the sources available at the time of update(s)      Prior to Admission medications    Medication Sig Last Dose Taking? Auth Provider   acetaminophen (TYLENOL) 325 MG tablet Take 325-650 mg by mouth 4 times daily as needed for mild pain  at prn Yes Reported, Patient   amLODIPine (NORVASC) 10 MG tablet Take 10 mg by mouth At Bedtime  at hs Yes Unknown, Entered By History   amoxicillin (AMOXIL) 500 MG tablet Take 2,000 mg by mouth daily as needed (1 hour prior to dental vists) May 2021 Yes Reported, Patient   atenolol (TENORMIN) 50 MG tablet Take 50 mg by mouth every morning   at am Yes Unknown, Entered By History   cyclobenzaprine (FLEXERIL) 10 MG tablet Take 10 mg by mouth nightly as needed for muscle spasms > 1 month at prn Yes Reported, Patient   ferrous sulfate (FEROSUL) 325 (65 Fe) MG tablet Take 325 mg by mouth 2 times daily  Yes Reported, Patient   FLUoxetine (PROZAC) 20 MG capsule Take 20 mg by mouth daily (with lunch)  6/28/2021 at lunch Yes Unknown, Entered By History   hydrOXYzine (ATARAX) 25 MG tablet Take 25 mg by mouth 3 times daily as needed for itching > 1 month at prn Yes Reported, Patient   ibuprofen (ADVIL/MOTRIN) 200 MG tablet Take 200 mg by mouth 3 times daily as needed for mild pain  at prn Yes Reported, Patient   levothyroxine (SYNTHROID/LEVOTHROID) 175 MCG tablet Take 175 mcg by  mouth daily  at am Yes Unknown, Entered By History

## 2021-07-09 DIAGNOSIS — Z11.59 ENCOUNTER FOR SCREENING FOR OTHER VIRAL DISEASES: ICD-10-CM

## 2021-07-26 ENCOUNTER — LAB (OUTPATIENT)
Dept: LAB | Facility: CLINIC | Age: 61
End: 2021-07-26
Payer: COMMERCIAL

## 2021-07-26 ENCOUNTER — ANESTHESIA EVENT (OUTPATIENT)
Dept: SURGERY | Facility: CLINIC | Age: 61
End: 2021-07-26
Payer: COMMERCIAL

## 2021-07-26 DIAGNOSIS — Z11.59 ENCOUNTER FOR SCREENING FOR OTHER VIRAL DISEASES: ICD-10-CM

## 2021-07-26 LAB — SARS-COV-2 RNA RESP QL NAA+PROBE: NEGATIVE

## 2021-07-26 PROCEDURE — U0005 INFEC AGEN DETEC AMPLI PROBE: HCPCS

## 2021-07-26 PROCEDURE — U0003 INFECTIOUS AGENT DETECTION BY NUCLEIC ACID (DNA OR RNA); SEVERE ACUTE RESPIRATORY SYNDROME CORONAVIRUS 2 (SARS-COV-2) (CORONAVIRUS DISEASE [COVID-19]), AMPLIFIED PROBE TECHNIQUE, MAKING USE OF HIGH THROUGHPUT TECHNOLOGIES AS DESCRIBED BY CMS-2020-01-R: HCPCS

## 2021-07-26 RX ORDER — IBUPROFEN 200 MG
200 TABLET ORAL 4 TIMES DAILY PRN
Status: ON HOLD | COMMUNITY
End: 2021-07-28

## 2021-07-26 RX ORDER — AMOXICILLIN 500 MG/1
2000 TABLET, FILM COATED ORAL DAILY PRN
COMMUNITY

## 2021-07-26 RX ORDER — MULTIVIT-MIN/IRON/FOLIC ACID/K 18-600-40
1 CAPSULE ORAL EVERY EVENING
COMMUNITY

## 2021-07-26 RX ORDER — FERROUS SULFATE 325(65) MG
325 TABLET ORAL DAILY
COMMUNITY

## 2021-07-26 RX ORDER — CYCLOBENZAPRINE HCL 10 MG
10 TABLET ORAL
Status: ON HOLD | COMMUNITY
End: 2021-07-28

## 2021-07-26 ASSESSMENT — LIFESTYLE VARIABLES: TOBACCO_USE: 1

## 2021-07-26 NOTE — PROGRESS NOTES
PTA medications updated by Medication Scribe prior to surgery via phone call with patient (last doses completed by Nurse)     Medication history sources: Patient, Surescripts and H&P  In the past week, patient estimated taking medication this percent of the time: Greater than 90%  Adherence assessment: N/A Not Observed    Significant changes made to the medication list:  None      Additional medication history information:   None    Medication reconciliation completed by provider prior to medication history? No    Time spent in this activity: 30 MINUTES    The information provided in this note is only as accurate as the sources available at the time of update(s)      Prior to Admission medications    Medication Sig Last Dose Taking? Auth Provider   acetaminophen-codeine (TYLENOL #3) 300-30 MG tablet Take 1 tablet by mouth 4 times daily as needed for severe pain  at PRN Yes Reported, Patient   amLODIPine (NORVASC) 10 MG tablet Take 10 mg by mouth every morning  7/26/2021 at AM Yes Unknown, Entered By History   amoxicillin (AMOXIL) 500 MG tablet Take 2,000 mg by mouth daily as needed > 1 MONTH at PRN Yes Reported, Patient   atenolol (TENORMIN) 50 MG tablet Take 50 mg by mouth every evening   at PM Yes Unknown, Entered By History   cyclobenzaprine (FLEXERIL) 10 MG tablet Take 10 mg by mouth nightly as needed for muscle spasms >1 WEEK at PRN Yes Reported, Patient   ferrous sulfate (FEROSUL) 325 (65 Fe) MG tablet Take 325 mg by mouth 2 times daily 7/26/2021 at PM Yes Reported, Patient   FLUoxetine (PROZAC) 20 MG capsule Take 20 mg by mouth daily (with lunch)  7/26/2021 at LUNCH Yes Unknown, Entered By History   ibuprofen (ADVIL/MOTRIN) 200 MG tablet Take 200 mg by mouth 4 times daily as needed for mild pain  Yes Reported, Patient   levothyroxine (SYNTHROID/LEVOTHROID) 175 MCG tablet Take 175 mcg by mouth daily  at AM Yes Unknown, Entered By History   Vitamin D, Cholecalciferol, 25 MCG (1000 UT) TABS Take 1 tablet by  mouth every evening 7/26/2021 at PM Yes Reported, Patient

## 2021-07-27 ENCOUNTER — HOSPITAL ENCOUNTER (OUTPATIENT)
Facility: CLINIC | Age: 61
Discharge: HOME HEALTH | End: 2021-07-28
Attending: ORTHOPAEDIC SURGERY | Admitting: ORTHOPAEDIC SURGERY
Payer: COMMERCIAL

## 2021-07-27 ENCOUNTER — APPOINTMENT (OUTPATIENT)
Dept: PHYSICAL THERAPY | Facility: CLINIC | Age: 61
End: 2021-07-27
Attending: ORTHOPAEDIC SURGERY
Payer: COMMERCIAL

## 2021-07-27 ENCOUNTER — ANESTHESIA (OUTPATIENT)
Dept: SURGERY | Facility: CLINIC | Age: 61
End: 2021-07-27
Payer: COMMERCIAL

## 2021-07-27 ENCOUNTER — APPOINTMENT (OUTPATIENT)
Dept: GENERAL RADIOLOGY | Facility: CLINIC | Age: 61
End: 2021-07-27
Attending: PHYSICIAN ASSISTANT
Payer: COMMERCIAL

## 2021-07-27 DIAGNOSIS — Z98.890 POST-OPERATIVE STATE: Primary | ICD-10-CM

## 2021-07-27 PROBLEM — Z96.642 STATUS POST TOTAL HIP REPLACEMENT, LEFT: Status: ACTIVE | Noted: 2021-07-27

## 2021-07-27 PROBLEM — Z96.649 S/P TOTAL HIP ARTHROPLASTY: Status: ACTIVE | Noted: 2021-07-27

## 2021-07-27 LAB
ABO/RH(D): NORMAL
ANTIBODY SCREEN: NEGATIVE
CREAT SERPL-MCNC: 0.58 MG/DL (ref 0.52–1.04)
GFR SERPL CREATININE-BSD FRML MDRD: >90 ML/MIN/1.73M2
HGB BLD-MCNC: 12 G/DL (ref 11.7–15.7)
POTASSIUM BLD-SCNC: 4.1 MMOL/L (ref 3.4–5.3)
SPECIMEN EXPIRATION DATE: NORMAL

## 2021-07-27 PROCEDURE — 86900 BLOOD TYPING SEROLOGIC ABO: CPT | Performed by: ANESTHESIOLOGY

## 2021-07-27 PROCEDURE — 258N000001 HC RX 258: Performed by: ORTHOPAEDIC SURGERY

## 2021-07-27 PROCEDURE — 250N000011 HC RX IP 250 OP 636: Performed by: ANESTHESIOLOGY

## 2021-07-27 PROCEDURE — 250N000011 HC RX IP 250 OP 636: Performed by: NURSE ANESTHETIST, CERTIFIED REGISTERED

## 2021-07-27 PROCEDURE — 250N000013 HC RX MED GY IP 250 OP 250 PS 637: Performed by: PHYSICIAN ASSISTANT

## 2021-07-27 PROCEDURE — 258N000003 HC RX IP 258 OP 636: Performed by: NURSE ANESTHETIST, CERTIFIED REGISTERED

## 2021-07-27 PROCEDURE — 370N000017 HC ANESTHESIA TECHNICAL FEE, PER MIN: Performed by: ORTHOPAEDIC SURGERY

## 2021-07-27 PROCEDURE — 250N000009 HC RX 250: Performed by: ORTHOPAEDIC SURGERY

## 2021-07-27 PROCEDURE — 73502 X-RAY EXAM HIP UNI 2-3 VIEWS: CPT

## 2021-07-27 PROCEDURE — 97116 GAIT TRAINING THERAPY: CPT | Mod: GP | Performed by: PHYSICAL THERAPIST

## 2021-07-27 PROCEDURE — C1776 JOINT DEVICE (IMPLANTABLE): HCPCS | Performed by: ORTHOPAEDIC SURGERY

## 2021-07-27 PROCEDURE — 250N000011 HC RX IP 250 OP 636: Performed by: ORTHOPAEDIC SURGERY

## 2021-07-27 PROCEDURE — 999N000141 HC STATISTIC PRE-PROCEDURE NURSING ASSESSMENT: Performed by: ORTHOPAEDIC SURGERY

## 2021-07-27 PROCEDURE — 97530 THERAPEUTIC ACTIVITIES: CPT | Mod: GP | Performed by: PHYSICAL THERAPIST

## 2021-07-27 PROCEDURE — 85018 HEMOGLOBIN: CPT | Performed by: ANESTHESIOLOGY

## 2021-07-27 PROCEDURE — 97161 PT EVAL LOW COMPLEX 20 MIN: CPT | Mod: GP | Performed by: PHYSICAL THERAPIST

## 2021-07-27 PROCEDURE — 250N000011 HC RX IP 250 OP 636: Performed by: PHYSICIAN ASSISTANT

## 2021-07-27 PROCEDURE — 258N000003 HC RX IP 258 OP 636: Performed by: ANESTHESIOLOGY

## 2021-07-27 PROCEDURE — 278N000051 HC OR IMPLANT GENERAL: Performed by: ORTHOPAEDIC SURGERY

## 2021-07-27 PROCEDURE — 710N000009 HC RECOVERY PHASE 1, LEVEL 1, PER MIN: Performed by: ORTHOPAEDIC SURGERY

## 2021-07-27 PROCEDURE — 250N000013 HC RX MED GY IP 250 OP 250 PS 637: Performed by: ANESTHESIOLOGY

## 2021-07-27 PROCEDURE — 99207 PR NO CHARGE LOS: CPT | Performed by: PHYSICIAN ASSISTANT

## 2021-07-27 PROCEDURE — 272N000001 HC OR GENERAL SUPPLY STERILE: Performed by: ORTHOPAEDIC SURGERY

## 2021-07-27 PROCEDURE — 84132 ASSAY OF SERUM POTASSIUM: CPT | Performed by: ANESTHESIOLOGY

## 2021-07-27 PROCEDURE — 258N000003 HC RX IP 258 OP 636: Performed by: PHYSICIAN ASSISTANT

## 2021-07-27 PROCEDURE — 360N000077 HC SURGERY LEVEL 4, PER MIN: Performed by: ORTHOPAEDIC SURGERY

## 2021-07-27 PROCEDURE — C1713 ANCHOR/SCREW BN/BN,TIS/BN: HCPCS | Performed by: ORTHOPAEDIC SURGERY

## 2021-07-27 PROCEDURE — 36415 COLL VENOUS BLD VENIPUNCTURE: CPT | Performed by: ANESTHESIOLOGY

## 2021-07-27 PROCEDURE — 82565 ASSAY OF CREATININE: CPT | Performed by: ANESTHESIOLOGY

## 2021-07-27 PROCEDURE — 250N000009 HC RX 250: Performed by: NURSE ANESTHETIST, CERTIFIED REGISTERED

## 2021-07-27 DEVICE — IMP LINER SNR ACET R3 XLPE 0DEG 36X52MM 71332752: Type: IMPLANTABLE DEVICE | Site: HIP | Status: FUNCTIONAL

## 2021-07-27 DEVICE — IMP SHELL SNR ACET R3 3H 52MM 71335552: Type: IMPLANTABLE DEVICE | Site: HIP | Status: FUNCTIONAL

## 2021-07-27 DEVICE — IMPLANTABLE DEVICE: Type: IMPLANTABLE DEVICE | Site: HIP | Status: FUNCTIONAL

## 2021-07-27 DEVICE — IMP STEM SNN HIGH OFFSET SZ 9 71356109: Type: IMPLANTABLE DEVICE | Site: HIP | Status: FUNCTIONAL

## 2021-07-27 DEVICE — IMP SCR ACET SNN SPHERICAL HEAD 6.5X25MM 71332525: Type: IMPLANTABLE DEVICE | Site: HIP | Status: FUNCTIONAL

## 2021-07-27 DEVICE — IMP HOLE COVER SNN ACETAB CUP REFLEC INTERFIT 71336500: Type: IMPLANTABLE DEVICE | Site: HIP | Status: FUNCTIONAL

## 2021-07-27 DEVICE — IMP HEAD FEMORAL SNR OXINIUM 12/14 36MM +0 71343600: Type: IMPLANTABLE DEVICE | Site: HIP | Status: FUNCTIONAL

## 2021-07-27 RX ORDER — CEFAZOLIN SODIUM 2 G/100ML
2 INJECTION, SOLUTION INTRAVENOUS SEE ADMIN INSTRUCTIONS
Status: DISCONTINUED | OUTPATIENT
Start: 2021-07-27 | End: 2021-07-27 | Stop reason: HOSPADM

## 2021-07-27 RX ORDER — BISACODYL 10 MG
10 SUPPOSITORY, RECTAL RECTAL DAILY PRN
Status: DISCONTINUED | OUTPATIENT
Start: 2021-07-27 | End: 2021-07-28 | Stop reason: HOSPADM

## 2021-07-27 RX ORDER — ONDANSETRON 2 MG/ML
4 INJECTION INTRAMUSCULAR; INTRAVENOUS EVERY 6 HOURS PRN
Status: DISCONTINUED | OUTPATIENT
Start: 2021-07-27 | End: 2021-07-28 | Stop reason: HOSPADM

## 2021-07-27 RX ORDER — ACETAMINOPHEN 325 MG/1
650 TABLET ORAL EVERY 4 HOURS PRN
Qty: 100 TABLET | Refills: 0 | Status: SHIPPED | OUTPATIENT
Start: 2021-07-27

## 2021-07-27 RX ORDER — VANCOMYCIN HYDROCHLORIDE 1 G/20ML
INJECTION, POWDER, LYOPHILIZED, FOR SOLUTION INTRAVENOUS PRN
Status: DISCONTINUED | OUTPATIENT
Start: 2021-07-27 | End: 2021-07-27

## 2021-07-27 RX ORDER — PROPOFOL 10 MG/ML
INJECTION, EMULSION INTRAVENOUS CONTINUOUS PRN
Status: DISCONTINUED | OUTPATIENT
Start: 2021-07-27 | End: 2021-07-27

## 2021-07-27 RX ORDER — HYDRALAZINE HYDROCHLORIDE 20 MG/ML
5 INJECTION INTRAMUSCULAR; INTRAVENOUS ONCE
Status: COMPLETED | OUTPATIENT
Start: 2021-07-27 | End: 2021-07-27

## 2021-07-27 RX ORDER — CEFAZOLIN SODIUM 1 G/3ML
1 INJECTION, POWDER, FOR SOLUTION INTRAMUSCULAR; INTRAVENOUS EVERY 8 HOURS
Status: DISCONTINUED | OUTPATIENT
Start: 2021-07-27 | End: 2021-07-27

## 2021-07-27 RX ORDER — OXYCODONE HYDROCHLORIDE 5 MG/1
5-10 TABLET ORAL
Qty: 50 TABLET | Refills: 0 | Status: SHIPPED | OUTPATIENT
Start: 2021-07-27 | End: 2021-07-28

## 2021-07-27 RX ORDER — POLYETHYLENE GLYCOL 3350 17 G/17G
17 POWDER, FOR SOLUTION ORAL DAILY
Status: DISCONTINUED | OUTPATIENT
Start: 2021-07-28 | End: 2021-07-27

## 2021-07-27 RX ORDER — LIDOCAINE 40 MG/G
CREAM TOPICAL
Status: DISCONTINUED | OUTPATIENT
Start: 2021-07-27 | End: 2021-07-27

## 2021-07-27 RX ORDER — ATENOLOL 25 MG/1
50 TABLET ORAL DAILY
Status: DISCONTINUED | OUTPATIENT
Start: 2021-07-28 | End: 2021-07-28 | Stop reason: HOSPADM

## 2021-07-27 RX ORDER — CELECOXIB 100 MG/1
100 CAPSULE ORAL 2 TIMES DAILY
Status: DISCONTINUED | OUTPATIENT
Start: 2021-07-27 | End: 2021-07-28 | Stop reason: HOSPADM

## 2021-07-27 RX ORDER — FENTANYL CITRATE 50 UG/ML
INJECTION, SOLUTION INTRAMUSCULAR; INTRAVENOUS PRN
Status: DISCONTINUED | OUTPATIENT
Start: 2021-07-27 | End: 2021-07-27

## 2021-07-27 RX ORDER — HYDROXYZINE HYDROCHLORIDE 25 MG/1
25 TABLET, FILM COATED ORAL EVERY 6 HOURS PRN
Qty: 30 TABLET | Refills: 0 | Status: SHIPPED | OUTPATIENT
Start: 2021-07-27

## 2021-07-27 RX ORDER — TRANEXAMIC ACID 650 MG/1
1950 TABLET ORAL ONCE
Status: COMPLETED | OUTPATIENT
Start: 2021-07-27 | End: 2021-07-27

## 2021-07-27 RX ORDER — ATENOLOL 25 MG/1
50 TABLET ORAL ONCE
Status: COMPLETED | OUTPATIENT
Start: 2021-07-27 | End: 2021-07-27

## 2021-07-27 RX ORDER — LIDOCAINE 40 MG/G
CREAM TOPICAL
Status: DISCONTINUED | OUTPATIENT
Start: 2021-07-27 | End: 2021-07-28 | Stop reason: HOSPADM

## 2021-07-27 RX ORDER — ASPIRIN 81 MG/1
162 TABLET ORAL DAILY
Status: DISCONTINUED | OUTPATIENT
Start: 2021-07-27 | End: 2021-07-27

## 2021-07-27 RX ORDER — SODIUM CHLORIDE, SODIUM LACTATE, POTASSIUM CHLORIDE, CALCIUM CHLORIDE 600; 310; 30; 20 MG/100ML; MG/100ML; MG/100ML; MG/100ML
INJECTION, SOLUTION INTRAVENOUS CONTINUOUS
Status: DISCONTINUED | OUTPATIENT
Start: 2021-07-27 | End: 2021-07-27 | Stop reason: HOSPADM

## 2021-07-27 RX ORDER — NALOXONE HYDROCHLORIDE 0.4 MG/ML
0.4 INJECTION, SOLUTION INTRAMUSCULAR; INTRAVENOUS; SUBCUTANEOUS
Status: DISCONTINUED | OUTPATIENT
Start: 2021-07-27 | End: 2021-07-28 | Stop reason: HOSPADM

## 2021-07-27 RX ORDER — ONDANSETRON 2 MG/ML
INJECTION INTRAMUSCULAR; INTRAVENOUS PRN
Status: DISCONTINUED | OUTPATIENT
Start: 2021-07-27 | End: 2021-07-27

## 2021-07-27 RX ORDER — DIPHENHYDRAMINE HCL 25 MG
25 CAPSULE ORAL EVERY 6 HOURS PRN
Status: DISCONTINUED | OUTPATIENT
Start: 2021-07-27 | End: 2021-07-28 | Stop reason: HOSPADM

## 2021-07-27 RX ORDER — HYDROMORPHONE HCL IN WATER/PF 6 MG/30 ML
.2-.4 PATIENT CONTROLLED ANALGESIA SYRINGE INTRAVENOUS EVERY 5 MIN PRN
Status: DISCONTINUED | OUTPATIENT
Start: 2021-07-27 | End: 2021-07-27 | Stop reason: HOSPADM

## 2021-07-27 RX ORDER — ONDANSETRON 4 MG/1
4 TABLET, ORALLY DISINTEGRATING ORAL EVERY 30 MIN PRN
Status: DISCONTINUED | OUTPATIENT
Start: 2021-07-27 | End: 2021-07-27 | Stop reason: HOSPADM

## 2021-07-27 RX ORDER — AMOXICILLIN 250 MG
1 CAPSULE ORAL 2 TIMES DAILY
Status: DISCONTINUED | OUTPATIENT
Start: 2021-07-27 | End: 2021-07-28 | Stop reason: HOSPADM

## 2021-07-27 RX ORDER — ASPIRIN 81 MG/1
162 TABLET ORAL DAILY
Qty: 120 TABLET | Refills: 0 | Status: ON HOLD | OUTPATIENT
Start: 2021-07-27 | End: 2024-03-09

## 2021-07-27 RX ORDER — AMLODIPINE BESYLATE 10 MG/1
10 TABLET ORAL EVERY MORNING
Status: DISCONTINUED | OUTPATIENT
Start: 2021-07-28 | End: 2021-07-28 | Stop reason: HOSPADM

## 2021-07-27 RX ORDER — VANCOMYCIN HYDROCHLORIDE 1 G/20ML
INJECTION, POWDER, LYOPHILIZED, FOR SOLUTION INTRAVENOUS
Status: DISCONTINUED
Start: 2021-07-27 | End: 2021-07-27 | Stop reason: HOSPADM

## 2021-07-27 RX ORDER — LIDOCAINE HYDROCHLORIDE 20 MG/ML
INJECTION, SOLUTION INFILTRATION; PERINEURAL PRN
Status: DISCONTINUED | OUTPATIENT
Start: 2021-07-27 | End: 2021-07-27

## 2021-07-27 RX ORDER — EPHEDRINE SULFATE 50 MG/ML
INJECTION, SOLUTION INTRAMUSCULAR; INTRAVENOUS; SUBCUTANEOUS PRN
Status: DISCONTINUED | OUTPATIENT
Start: 2021-07-27 | End: 2021-07-27

## 2021-07-27 RX ORDER — PROCHLORPERAZINE MALEATE 10 MG
10 TABLET ORAL EVERY 6 HOURS PRN
Status: DISCONTINUED | OUTPATIENT
Start: 2021-07-27 | End: 2021-07-28 | Stop reason: HOSPADM

## 2021-07-27 RX ORDER — ACETAMINOPHEN 325 MG/1
975 TABLET ORAL EVERY 8 HOURS
Status: DISCONTINUED | OUTPATIENT
Start: 2021-07-27 | End: 2021-07-27

## 2021-07-27 RX ORDER — PROCHLORPERAZINE MALEATE 10 MG
10 TABLET ORAL EVERY 6 HOURS PRN
Status: DISCONTINUED | OUTPATIENT
Start: 2021-07-27 | End: 2021-07-27

## 2021-07-27 RX ORDER — OXYCODONE HYDROCHLORIDE 5 MG/1
5 TABLET ORAL EVERY 4 HOURS PRN
Status: DISCONTINUED | OUTPATIENT
Start: 2021-07-27 | End: 2021-07-27

## 2021-07-27 RX ORDER — ONDANSETRON 4 MG/1
4 TABLET, ORALLY DISINTEGRATING ORAL EVERY 6 HOURS PRN
Status: DISCONTINUED | OUTPATIENT
Start: 2021-07-27 | End: 2021-07-27

## 2021-07-27 RX ORDER — FERROUS SULFATE 325(65) MG
325 TABLET ORAL 2 TIMES DAILY
Status: DISCONTINUED | OUTPATIENT
Start: 2021-07-27 | End: 2021-07-28 | Stop reason: HOSPADM

## 2021-07-27 RX ORDER — POLYETHYLENE GLYCOL 3350 17 G/17G
17 POWDER, FOR SOLUTION ORAL DAILY
Status: DISCONTINUED | OUTPATIENT
Start: 2021-07-28 | End: 2021-07-28 | Stop reason: HOSPADM

## 2021-07-27 RX ORDER — DIPHENHYDRAMINE HCL 25 MG
25 CAPSULE ORAL EVERY 6 HOURS PRN
Status: DISCONTINUED | OUTPATIENT
Start: 2021-07-27 | End: 2021-07-27

## 2021-07-27 RX ORDER — SODIUM CHLORIDE, SODIUM LACTATE, POTASSIUM CHLORIDE, CALCIUM CHLORIDE 600; 310; 30; 20 MG/100ML; MG/100ML; MG/100ML; MG/100ML
INJECTION, SOLUTION INTRAVENOUS CONTINUOUS
Status: DISCONTINUED | OUTPATIENT
Start: 2021-07-27 | End: 2021-07-27

## 2021-07-27 RX ORDER — ASPIRIN 81 MG/1
162 TABLET ORAL DAILY
Status: DISCONTINUED | OUTPATIENT
Start: 2021-07-27 | End: 2021-07-28 | Stop reason: HOSPADM

## 2021-07-27 RX ORDER — HYDROXYZINE HYDROCHLORIDE 25 MG/1
25 TABLET, FILM COATED ORAL EVERY 6 HOURS PRN
Status: DISCONTINUED | OUTPATIENT
Start: 2021-07-27 | End: 2021-07-28 | Stop reason: HOSPADM

## 2021-07-27 RX ORDER — ACETAMINOPHEN 325 MG/1
975 TABLET ORAL ONCE
Status: COMPLETED | OUTPATIENT
Start: 2021-07-27 | End: 2021-07-27

## 2021-07-27 RX ORDER — HYDROXYZINE HYDROCHLORIDE 25 MG/1
25 TABLET, FILM COATED ORAL EVERY 6 HOURS PRN
Status: DISCONTINUED | OUTPATIENT
Start: 2021-07-27 | End: 2021-07-27

## 2021-07-27 RX ORDER — HYDROMORPHONE HCL IN WATER/PF 6 MG/30 ML
0.2 PATIENT CONTROLLED ANALGESIA SYRINGE INTRAVENOUS
Status: DISCONTINUED | OUTPATIENT
Start: 2021-07-27 | End: 2021-07-27

## 2021-07-27 RX ORDER — HYDROMORPHONE HCL IN WATER/PF 6 MG/30 ML
0.4 PATIENT CONTROLLED ANALGESIA SYRINGE INTRAVENOUS
Status: DISCONTINUED | OUTPATIENT
Start: 2021-07-27 | End: 2021-07-28 | Stop reason: HOSPADM

## 2021-07-27 RX ORDER — ACETAMINOPHEN 325 MG/1
975 TABLET ORAL EVERY 8 HOURS
Status: DISCONTINUED | OUTPATIENT
Start: 2021-07-27 | End: 2021-07-28 | Stop reason: HOSPADM

## 2021-07-27 RX ORDER — OXYCODONE HYDROCHLORIDE 5 MG/1
5 TABLET ORAL EVERY 4 HOURS PRN
Status: DISCONTINUED | OUTPATIENT
Start: 2021-07-27 | End: 2021-07-28 | Stop reason: HOSPADM

## 2021-07-27 RX ORDER — SODIUM CHLORIDE, SODIUM LACTATE, POTASSIUM CHLORIDE, CALCIUM CHLORIDE 600; 310; 30; 20 MG/100ML; MG/100ML; MG/100ML; MG/100ML
INJECTION, SOLUTION INTRAVENOUS CONTINUOUS
Status: DISCONTINUED | OUTPATIENT
Start: 2021-07-27 | End: 2021-07-28 | Stop reason: HOSPADM

## 2021-07-27 RX ORDER — BISACODYL 10 MG
10 SUPPOSITORY, RECTAL RECTAL DAILY PRN
Status: DISCONTINUED | OUTPATIENT
Start: 2021-07-27 | End: 2021-07-27

## 2021-07-27 RX ORDER — CELECOXIB 200 MG/1
400 CAPSULE ORAL ONCE
Status: COMPLETED | OUTPATIENT
Start: 2021-07-27 | End: 2021-07-27

## 2021-07-27 RX ORDER — HYDROMORPHONE HCL IN WATER/PF 6 MG/30 ML
0.2 PATIENT CONTROLLED ANALGESIA SYRINGE INTRAVENOUS
Status: DISCONTINUED | OUTPATIENT
Start: 2021-07-27 | End: 2021-07-28 | Stop reason: HOSPADM

## 2021-07-27 RX ORDER — ONDANSETRON 4 MG/1
4 TABLET, ORALLY DISINTEGRATING ORAL EVERY 6 HOURS PRN
Status: DISCONTINUED | OUTPATIENT
Start: 2021-07-27 | End: 2021-07-28 | Stop reason: HOSPADM

## 2021-07-27 RX ORDER — NALOXONE HYDROCHLORIDE 0.4 MG/ML
0.2 INJECTION, SOLUTION INTRAMUSCULAR; INTRAVENOUS; SUBCUTANEOUS
Status: DISCONTINUED | OUTPATIENT
Start: 2021-07-27 | End: 2021-07-28 | Stop reason: HOSPADM

## 2021-07-27 RX ORDER — CELECOXIB 200 MG/1
200 CAPSULE ORAL DAILY
Qty: 40 CAPSULE | Refills: 0 | Status: SHIPPED | OUTPATIENT
Start: 2021-07-27 | End: 2021-07-28

## 2021-07-27 RX ORDER — CEFAZOLIN SODIUM 2 G/100ML
2 INJECTION, SOLUTION INTRAVENOUS EVERY 8 HOURS
Status: COMPLETED | OUTPATIENT
Start: 2021-07-27 | End: 2021-07-28

## 2021-07-27 RX ORDER — FENTANYL CITRATE 0.05 MG/ML
25-50 INJECTION, SOLUTION INTRAMUSCULAR; INTRAVENOUS EVERY 5 MIN PRN
Status: DISCONTINUED | OUTPATIENT
Start: 2021-07-27 | End: 2021-07-27 | Stop reason: HOSPADM

## 2021-07-27 RX ORDER — ONDANSETRON 2 MG/ML
4 INJECTION INTRAMUSCULAR; INTRAVENOUS EVERY 30 MIN PRN
Status: DISCONTINUED | OUTPATIENT
Start: 2021-07-27 | End: 2021-07-27 | Stop reason: HOSPADM

## 2021-07-27 RX ORDER — DOCUSATE SODIUM 100 MG/1
100 CAPSULE, LIQUID FILLED ORAL 2 TIMES DAILY
Status: DISCONTINUED | OUTPATIENT
Start: 2021-07-27 | End: 2021-07-28 | Stop reason: HOSPADM

## 2021-07-27 RX ORDER — ONDANSETRON 2 MG/ML
4 INJECTION INTRAMUSCULAR; INTRAVENOUS EVERY 6 HOURS PRN
Status: DISCONTINUED | OUTPATIENT
Start: 2021-07-27 | End: 2021-07-27

## 2021-07-27 RX ORDER — OXYCODONE HYDROCHLORIDE 5 MG/1
10 TABLET ORAL EVERY 4 HOURS PRN
Status: DISCONTINUED | OUTPATIENT
Start: 2021-07-27 | End: 2021-07-28 | Stop reason: HOSPADM

## 2021-07-27 RX ORDER — OXYCODONE HYDROCHLORIDE 5 MG/1
10 TABLET ORAL EVERY 4 HOURS PRN
Status: DISCONTINUED | OUTPATIENT
Start: 2021-07-27 | End: 2021-07-27

## 2021-07-27 RX ORDER — MEPERIDINE HYDROCHLORIDE 25 MG/ML
12.5 INJECTION INTRAMUSCULAR; INTRAVENOUS; SUBCUTANEOUS EVERY 5 MIN PRN
Status: DISCONTINUED | OUTPATIENT
Start: 2021-07-27 | End: 2021-07-27 | Stop reason: HOSPADM

## 2021-07-27 RX ORDER — CELECOXIB 100 MG/1
100 CAPSULE ORAL 2 TIMES DAILY
Status: DISCONTINUED | OUTPATIENT
Start: 2021-07-27 | End: 2021-07-27

## 2021-07-27 RX ORDER — HYDROMORPHONE HCL IN WATER/PF 6 MG/30 ML
0.4 PATIENT CONTROLLED ANALGESIA SYRINGE INTRAVENOUS
Status: DISCONTINUED | OUTPATIENT
Start: 2021-07-27 | End: 2021-07-27

## 2021-07-27 RX ORDER — AMOXICILLIN 250 MG
1 CAPSULE ORAL 2 TIMES DAILY
Status: DISCONTINUED | OUTPATIENT
Start: 2021-07-27 | End: 2021-07-27

## 2021-07-27 RX ORDER — CEFAZOLIN SODIUM 2 G/100ML
2 INJECTION, SOLUTION INTRAVENOUS
Status: COMPLETED | OUTPATIENT
Start: 2021-07-27 | End: 2021-07-27

## 2021-07-27 RX ORDER — AMOXICILLIN 250 MG
1-2 CAPSULE ORAL 2 TIMES DAILY
Qty: 100 TABLET | Refills: 0 | Status: ON HOLD | OUTPATIENT
Start: 2021-07-27 | End: 2024-03-10

## 2021-07-27 RX ORDER — MAGNESIUM HYDROXIDE 1200 MG/15ML
LIQUID ORAL PRN
Status: DISCONTINUED | OUTPATIENT
Start: 2021-07-27 | End: 2021-07-27 | Stop reason: HOSPADM

## 2021-07-27 RX ADMIN — TRANEXAMIC ACID 1950 MG: 650 TABLET ORAL at 07:19

## 2021-07-27 RX ADMIN — ATENOLOL 50 MG: 25 TABLET ORAL at 08:11

## 2021-07-27 RX ADMIN — PHENYLEPHRINE HYDROCHLORIDE 100 MCG: 10 INJECTION INTRAVENOUS at 10:29

## 2021-07-27 RX ADMIN — Medication 5 MG: at 10:29

## 2021-07-27 RX ADMIN — HYDROMORPHONE HYDROCHLORIDE 0.4 MG: 0.2 INJECTION, SOLUTION INTRAMUSCULAR; INTRAVENOUS; SUBCUTANEOUS at 14:01

## 2021-07-27 RX ADMIN — FENTANYL CITRATE 50 MCG: 50 INJECTION, SOLUTION INTRAMUSCULAR; INTRAVENOUS at 12:38

## 2021-07-27 RX ADMIN — Medication 5 MG: at 10:43

## 2021-07-27 RX ADMIN — ONDANSETRON 4 MG: 2 INJECTION INTRAMUSCULAR; INTRAVENOUS at 10:37

## 2021-07-27 RX ADMIN — HYDROMORPHONE HYDROCHLORIDE 0.4 MG: 0.2 INJECTION, SOLUTION INTRAMUSCULAR; INTRAVENOUS; SUBCUTANEOUS at 12:46

## 2021-07-27 RX ADMIN — CEFAZOLIN SODIUM 2 G: 2 INJECTION, SOLUTION INTRAVENOUS at 16:26

## 2021-07-27 RX ADMIN — LIDOCAINE HYDROCHLORIDE 40 MG: 20 INJECTION, SOLUTION INFILTRATION; PERINEURAL at 09:36

## 2021-07-27 RX ADMIN — FLUOXETINE HYDROCHLORIDE 20 MG: 20 CAPSULE ORAL at 15:12

## 2021-07-27 RX ADMIN — SODIUM CHLORIDE, POTASSIUM CHLORIDE, SODIUM LACTATE AND CALCIUM CHLORIDE: 600; 310; 30; 20 INJECTION, SOLUTION INTRAVENOUS at 08:27

## 2021-07-27 RX ADMIN — FENTANYL CITRATE 50 MCG: 50 INJECTION, SOLUTION INTRAMUSCULAR; INTRAVENOUS at 12:20

## 2021-07-27 RX ADMIN — ASPIRIN 162 MG: 81 TABLET, COATED ORAL at 15:11

## 2021-07-27 RX ADMIN — PROPOFOL 100 MCG/KG/MIN: 10 INJECTION, EMULSION INTRAVENOUS at 09:39

## 2021-07-27 RX ADMIN — DOCUSATE SODIUM 100 MG: 100 CAPSULE, LIQUID FILLED ORAL at 20:54

## 2021-07-27 RX ADMIN — MEPIVACAINE HYDROCHLORIDE 1.4 ML: 20 INJECTION, SOLUTION EPIDURAL; INFILTRATION at 09:34

## 2021-07-27 RX ADMIN — HYDROMORPHONE HYDROCHLORIDE 0.2 MG: 0.2 INJECTION, SOLUTION INTRAMUSCULAR; INTRAVENOUS; SUBCUTANEOUS at 12:58

## 2021-07-27 RX ADMIN — SENNOSIDES AND DOCUSATE SODIUM 1 TABLET: 8.6; 5 TABLET ORAL at 20:54

## 2021-07-27 RX ADMIN — PHENYLEPHRINE HYDROCHLORIDE 100 MCG: 10 INJECTION INTRAVENOUS at 10:43

## 2021-07-27 RX ADMIN — PHENYLEPHRINE HYDROCHLORIDE 100 MCG: 10 INJECTION INTRAVENOUS at 10:11

## 2021-07-27 RX ADMIN — CELECOXIB 400 MG: 200 CAPSULE ORAL at 07:21

## 2021-07-27 RX ADMIN — FENTANYL CITRATE 25 MCG: 50 INJECTION, SOLUTION INTRAMUSCULAR; INTRAVENOUS at 09:32

## 2021-07-27 RX ADMIN — Medication 5 MG: at 10:22

## 2021-07-27 RX ADMIN — PHENYLEPHRINE HYDROCHLORIDE 100 MCG: 10 INJECTION INTRAVENOUS at 09:54

## 2021-07-27 RX ADMIN — HYDROXYZINE HYDROCHLORIDE 25 MG: 25 TABLET ORAL at 14:01

## 2021-07-27 RX ADMIN — CEFAZOLIN SODIUM 2 G: 2 INJECTION, SOLUTION INTRAVENOUS at 09:20

## 2021-07-27 RX ADMIN — HYDROMORPHONE HYDROCHLORIDE 0.2 MG: 0.2 INJECTION, SOLUTION INTRAMUSCULAR; INTRAVENOUS; SUBCUTANEOUS at 11:23

## 2021-07-27 RX ADMIN — ACETAMINOPHEN 975 MG: 325 TABLET, FILM COATED ORAL at 15:10

## 2021-07-27 RX ADMIN — ACETAMINOPHEN 975 MG: 325 TABLET, FILM COATED ORAL at 22:38

## 2021-07-27 RX ADMIN — HYDRALAZINE HYDROCHLORIDE 5 MG: 20 INJECTION INTRAMUSCULAR; INTRAVENOUS at 12:08

## 2021-07-27 RX ADMIN — CELECOXIB 100 MG: 100 CAPSULE ORAL at 20:54

## 2021-07-27 RX ADMIN — HYDROMORPHONE HYDROCHLORIDE 0.4 MG: 0.2 INJECTION, SOLUTION INTRAMUSCULAR; INTRAVENOUS; SUBCUTANEOUS at 12:28

## 2021-07-27 RX ADMIN — PROPOFOL 150 MCG/KG/MIN: 10 INJECTION, EMULSION INTRAVENOUS at 09:36

## 2021-07-27 RX ADMIN — OXYCODONE HYDROCHLORIDE 10 MG: 5 TABLET ORAL at 15:12

## 2021-07-27 RX ADMIN — ACETAMINOPHEN 975 MG: 325 TABLET, FILM COATED ORAL at 08:11

## 2021-07-27 RX ADMIN — HYDROMORPHONE HYDROCHLORIDE 0.4 MG: 0.2 INJECTION, SOLUTION INTRAMUSCULAR; INTRAVENOUS; SUBCUTANEOUS at 19:39

## 2021-07-27 RX ADMIN — FENTANYL CITRATE 50 MCG: 50 INJECTION, SOLUTION INTRAMUSCULAR; INTRAVENOUS at 09:30

## 2021-07-27 RX ADMIN — OXYCODONE HYDROCHLORIDE 10 MG: 5 TABLET ORAL at 20:54

## 2021-07-27 RX ADMIN — FERROUS SULFATE TAB 325 MG (65 MG ELEMENTAL FE) 325 MG: 325 (65 FE) TAB at 20:54

## 2021-07-27 RX ADMIN — MIDAZOLAM 2 MG: 1 INJECTION INTRAMUSCULAR; INTRAVENOUS at 09:22

## 2021-07-27 ASSESSMENT — ACTIVITIES OF DAILY LIVING (ADL): TOILETING_ISSUES: NO

## 2021-07-27 ASSESSMENT — ENCOUNTER SYMPTOMS: ORTHOPNEA: 0

## 2021-07-27 ASSESSMENT — MIFFLIN-ST. JEOR: SCORE: 1426.78

## 2021-07-27 NOTE — PLAN OF CARE
[facility]      OUTPATIENT PHYSICAL THERAPY EVALUATION  PLAN OF TREATMENT FOR OUTPATIENT REHABILITATION  (COMPLETE FOR INITIAL CLAIMS ONLY)  Patient's Last Name, First Name, M.I.  YOB: 1960  Shannon Beckwith                        Provider's Name  [facility] Medical Record No.  [id number]                               Onset Date:  07/27/21   Start of Care Date:  07/27/21      Type:     _X_PT   ___OT   ___SLP Medical Diagnosis:  s/p left ALLEGRA                        PT Diagnosis:  impaired gait    Visits from SOC:  1   _________________________________________________________________________________  Plan of Treatment/Functional Goals    Planned Interventions: balance training, bed mobility training, gait training, home exercise program, strengthening, stair training, transfer training     Goals: See Physical Therapy Goals on Care Plan in [facility] electronic health record.    Therapy Frequency: 2x/day  Predicted Duration of Therapy Intervention: 3 days   _________________________________________________________________________________    I CERTIFY THE NEED FOR THESE SERVICES FURNISHED UNDER        THIS PLAN OF TREATMENT AND WHILE UNDER MY CARE     (Physician co-signature of this document indicates review and certification of the therapy plan).              Certification date from: 07/27/21, Certification date to: 07/31/21    Referring Physician: Robert Marrero PA-C            Initial Assessment        See Physical Therapy evaluation dated 07/27/21 in Epic electronic health record.

## 2021-07-27 NOTE — ANESTHESIA PREPROCEDURE EVALUATION
Anesthesia Pre-Procedure Evaluation    Patient: Shannon Beckwith   MRN: [id number] : 1960        Preoperative Diagnosis: Primary osteoarthritis of left hip [M16.12]   Procedure : Procedure(s):  LEFT TOTAL HIP ARTHROPLASTY     Past Medical History:   Diagnosis Date     Anxiety      Arthritis      Chemical dependency (H) sober since 2016 per PCP, multiple prior inpatient hospital stays for EtOH abuse     COVID-19 04/10/2021     Depression      Dyspnea on exertion      Eating disorder      History of 2019 novel coronavirus disease (COVID-19) 2021     History of blood transfusion      Hypertension pre hypertensive     Obese     eating disorder     Problem, psychiatric     Alcohlic     Sleep apnea 2000    Not a problem since weight loss surgery      Thyroid disease     hypothyroidism     Tracheal stenosis       Past Surgical History:   Procedure Laterality Date     ABDOMEN SURGERY      gastric bypass, elaine-en-y     APPENDECTOMY       ARTHRODESIS FOOT Right 3/30/2017    Procedure: ARTHRODESIS FOOT;  Surgeon: J Carlos Mcdonald MD;  Location: [facility] OR     BRONCHOSCOPY RIGID  2012    Procedure:BRONCHOSCOPY RIGID; Surgeon:TONNY PENA; Location:[facility] OR     GRAFT BONE FROM ILIAC CREST Right 3/30/2017    Procedure: GRAFT BONE FROM ILIAC CREST;  Surgeon: J Carlos Mcdonald MD;  Location: [facility] OR     INJECT STEROID (LOCATION) N/A 10/20/2016    Procedure: INJECT STEROID (LOCATION);  Surgeon: Aylin Del Rio MD;  Location: [facility] OR     LARYNGOSCOPY, ESOPHAGOSCOPY WITH DILATION, COMBINED  2012    Procedure:COMBINED LARYNGOSCOPY, ESOPHAGOSCOPY WITH DILATION; Direct Laryngoscopy, Rigid Bronchoscopy, Balloon Dilation of Subglottic Stenosis  **latex safe; Surgeon:TONNY PENA; Location:[facility] OR     LASER CO2 LARYNGOSCOPY, COMPLEX  2012    Procedure: LASER CO2 LARYNGOSCOPY, COMPLEX;  Microdirect Laryngoscopy, Incision and Dilation,  CO2 Laser, Kenalog Injection;  Surgeon: Aylin Del Rio  MD Destiny;  Location: [facility] OR     LASER CO2 LARYNGOSCOPY, COMPLEX N/A 10/20/2016    Procedure: LASER CO2 LARYNGOSCOPY, COMPLEX;  Surgeon: Aylin Del Rio MD;  Location: [facility] OR     ORTHOPEDIC SURGERY Left 2017    left total knee     subglottic dilatation       wisdom teeth removed[        Allergies   Allergen Reactions     Nkda [No Known Drug Allergies]       Social History     Tobacco Use     Smoking status: Former Smoker     Years: 5.00     Types: Cigarettes     Start date: 1976     Quit date: 1980     Years since quittin.5   Substance Use Topics     Alcohol use: No     Comment: AUG'18 & completed [place] trt      Wt Readings from Last 1 Encounters:   21 79.4 kg (175 lb)        Anesthesia Evaluation   Pt has had prior anesthetic.     History of anesthetic complications  - difficult airway.  h/o failed SAB to LMA, .    ROS/MED HX  ENT/Pulmonary: Comment:  difficult intubation, 5 ETT , severe subglottic stenosis,  H/o inspitatory stridor no stridor since -denies sx of tracheal subglottic stenosis     S/p laser laryngoscopy  and     (+) tobacco use, Past use,  (-) sleep apnea   Neurologic:       Cardiovascular: Comment: ecg-nsr     (+) hypertension----- (-) CONTE, orthopnea/PND and syncope   METS/Exercise Tolerance:     Hematologic: Comments: B12 def    (+) anemia,     Musculoskeletal:   (+) arthritis,     GI/Hepatic: Comment:  EtOH abuse and withdrawal sx, 3/2021 acute ETOH intoxication, acidosis, no EtOH times 3 months, good nutrition now   gastric bypass,MAXI,  h/o eating disorder  H/o [place] Dil   (-) GERD   Renal/Genitourinary: Comment: Electrolytes reviewed 21      Endo:     (+) thyroid problem, Obesity,     Psychiatric/Substance Use:     (+) psychiatric history anxiety and depression     Infectious Disease: Comment: 2021 COVID 19      Malignancy:       Other:            Physical Exam    Airway        Mallampati: II   TM distance: > 3 FB   Neck ROM:  full   Mouth opening: > 3 cm    Respiratory Devices and Support         Dental       (+) caps and chipped      Cardiovascular          Rhythm and rate: regular     Pulmonary           breath sounds clear to auscultation           OUTSIDE LABS:  CBC:   Lab Results   Component Value Date    WBC 9.3 03/01/2021    HGB 12.0 03/01/2021    HCT 36.5 03/01/2021     03/01/2021     BMP:   Lab Results   Component Value Date     03/02/2021     (L) 03/01/2021    POTASSIUM 4.4 03/02/2021    POTASSIUM 4.6 03/01/2021    CHLORIDE 105 03/02/2021    CHLORIDE 91 (L) 03/01/2021    CO2 26 03/02/2021    CO2 16 (L) 03/01/2021    BUN 7 03/02/2021    BUN 11 03/01/2021    CR 0.50 (L) 03/02/2021    CR 0.65 03/01/2021     (H) 03/02/2021    GLC 79 03/01/2021     COAGS: No results found for: PTT, INR, FIBR  POC:   Lab Results   Component Value Date     (H) 03/31/2017     HEPATIC:   Lab Results   Component Value Date    ALBUMIN 3.3 (L) 03/02/2021    PROTTOTAL 6.0 (L) 03/02/2021    ALT 34 03/02/2021    AST 63 (H) 03/02/2021    ALKPHOS 104 03/02/2021    BILITOTAL 0.9 03/02/2021     OTHER:   Lab Results   Component Value Date    LACT 2.1 (H) 03/02/2021    MARCELO 7.8 (L) 03/02/2021    MAG 1.7 03/01/2021     Prior to Admission medications    Medication Sig Start Date End Date Taking? Authorizing Provider   acetaminophen-codeine (TYLENOL #3) 300-30 MG tablet Take 1 tablet by mouth 4 times daily as needed for severe pain   Yes Reported, Patient   amLODIPine (NORVASC) 10 MG tablet Take 10 mg by mouth every morning    Yes Unknown, Entered By History   amoxicillin (AMOXIL) 500 MG tablet Take 2,000 mg by mouth daily as needed   Yes Reported, Patient   atenolol (TENORMIN) 50 MG tablet Take 50 mg by mouth every evening    Yes Unknown, Entered By History   cyclobenzaprine (FLEXERIL) 10 MG tablet Take 10 mg by mouth nightly as needed for muscle spasms   Yes Reported, Patient   ferrous sulfate (FEROSUL) 325 (65 Fe) MG tablet Take 325  mg by mouth 2 times daily   Yes Reported, Patient   FLUoxetine (PROZAC) 20 MG capsule Take 20 mg by mouth daily (with lunch)    Yes Unknown, Entered By History   ibuprofen (ADVIL/MOTRIN) 200 MG tablet Take 200 mg by mouth 4 times daily as needed for mild pain   Yes Reported, Patient   levothyroxine (SYNTHROID/LEVOTHROID) 175 MCG tablet Take 175 mcg by mouth daily   Yes Unknown, Entered By History   Vitamin D, Cholecalciferol, 25 MCG (1000 UT) TABS Take 1 tablet by mouth every evening   Yes Reported, Patient     Current Facility-Administered Medications Ordered in Epic   Medication Dose Route Frequency Last Rate Last Admin     ceFAZolin (ANCEF) intermittent infusion 2 g in 100 mL dextrose PRE-MIX  2 g Intravenous Pre-Op/Pre-procedure x 1 dose         ceFAZolin (ANCEF) intermittent infusion 2 g in 100 mL dextrose PRE-MIX  2 g Intravenous See Admin Instructions         lactated ringers infusion   Intravenous Continuous 25 mL/hr at 07/27/21 0827 New Bag at 07/27/21 0827     lidocaine 1 % 0.1-1 mL  0.1-1 mL Other Q1H PRN         ropivacaine (NAROPIN) 300 mg, ketorolac (TORADOL) 30 mg, EPINEPHrine (ADRENALIN) 0.6 mg in sodium chloride 0.9 % 100 mL (ORTHO WARNER STANDARD DOSE)   INTRA-ARTICULAR On Call to OR         vancomycin (VANCOCIN) 100 mg/mL injection             No current [facility]-ordered outpatient medications on file.       lactated ringers 25 mL/hr at 07/27/21 0827     No results for input(s): ABO, RH in the last 00357 hours.  No results for input(s): HCG in the last 55043 hours.  No results found for this or any previous visit (from the past 744 hour(s)).    Anesthesia Plan    ASA Status:  3      Anesthesia Type: Spinal.              Consents    Anesthesia Plan(s) and associated risks, benefits, and realistic alternatives discussed. Questions answered and patient/representative(s) expressed understanding.     - Discussed with:  Patient    Use of blood products discussed: Yes.     Postoperative Care       PONV  prophylaxis: Ondansetron (or other 5HT-3), Dexamethasone or Solumedrol     Comments:    No NSAIDs, difficult airway cart in OR            Ila Coates MD

## 2021-07-27 NOTE — PROGRESS NOTES
07/27/21 1615   Quick Adds   Type of Visit Initial PT Evaluation   Living Environment   People in home alone   Current Living Arrangements apartment   Home Accessibility no concerns   Transportation Anticipated family or friend will provide;health plan transportation   Living Environment Comments apt with walk in shower, elevated toilet seat with vanity and grab bars in bathroom. Hx of ETOH addiction and currently sober - did express concerns about being home alone and managing continued sobriety    Self-Care   Usual Activity Tolerance moderate   Current Activity Tolerance moderate   Regular Exercise No   Equipment Currently Used at Home none   Activity/Exercise/Self-Care Comment normally ind with self-cares and ADLs, drive herself. Has been more difficult and has had 1 fall in apt d/t left leg giving out.    Disability/Function   Fall history within last six months yes   Number of times patient has fallen within last six months 1   General Information   Onset of Illness/Injury or Date of Surgery 07/27/21   Referring Physician Robert Marrero PA-C   Patient/Family Therapy Goals Statement (PT) go to transitional care to have support and assist with managing sobriety with pain management    Pertinent History of Current Problem (include personal factors and/or comorbidities that impact the POC)  60yo female with PMHx hypertension, alcohol use disorder with recent relapse and treatment (currently sober), depression, and OA of left hip who underwent previously scheduled left ALLEGRA   Existing Precautions/Restrictions no hip ER;no hip ADD past midline;90 degree hip flexion   Weight-Bearing Status - LLE weight-bearing as tolerated   Cognition   Orientation Status (Cognition) oriented x 4   Affect/Mental Status (Cognition) anxious  (concerned about managing at home alone)   Cognitive Status Comments WFL   Pain Assessment   Patient Currently in Pain Yes, see Vital Sign flowsheet   Bed Mobility   Comment (Bed Mobility) Min A  supine <> sit with HOB elevated   Transfers   Transfer Safety Comments CGA sit <> stand with FWW    Gait/Stairs (Locomotion)   [place] Level (Gait) contact guard   Assistive Device (Gait) walker, front-wheeled   Distance in Feet (Required for LE Total Joints) 100'   Clinical Impression   Criteria for Skilled Therapeutic Intervention yes, treatment indicated   PT Diagnosis (PT) impaired gait    Influenced by the following impairments weakness, pain, impaired balance    Functional limitations due to impairments fall risk, decreased activity tolerance    Clinical Presentation Stable/Uncomplicated   Clinical Presentation Rationale clinical judgement    Clinical Decision Making (Complexity) low complexity   Therapy Frequency (PT) 2x/day   Predicted Duration of Therapy Intervention (days/wks) 3 days    Planned Therapy Interventions (PT) balance training;bed mobility training;gait training;home exercise program;strengthening;stair training;transfer training   Anticipated Equipment Needs at Discharge (PT) walker, rolling   Risk & Benefits of therapy have been explained evaluation/treatment results reviewed;care plan/treatment goals reviewed;risks/benefits reviewed;current/potential barriers reviewed;participants voiced agreement with care plan;participants included;patient   PT Discharge Planning    PT Rationale for DC Rec Patient tolerating therapy well; would benefit from support in TCU setting d/t being alone and having concerns managing pain while managing sobriety post surgery. if patient does d/.c home to apt recommend home PT d/t decreased ability to leave apt safely and easily.    PT Brief overview of current status  Min A bed mob, CGA transfers and gait    Therapy Certification   Start of care date 07/27/21   Certification date from 07/27/21   Certification date to 07/31/21   Medical Diagnosis s/p left ALLEGRA   Total Evaluation Time   Total Evaluation Time (Minutes) 10

## 2021-07-27 NOTE — ANESTHESIA POSTPROCEDURE EVALUATION
Patient: Shannon Beckwith    Procedure(s):  LEFT TOTAL HIP ARTHROPLASTY    Diagnosis:Primary osteoarthritis of left hip [M16.12]  Diagnosis Additional Information: No value filed.    Anesthesia Type:  Spinal    Note:     Postop Pain Control: Uneventful            Sign Out: Well controlled pain   PONV: No   Neuro/Psych: Uneventful            Sign Out: Acceptable/Baseline neuro status   Airway/Respiratory: Uneventful            Sign Out: Acceptable/Baseline resp. status   CV/Hemodynamics: Uneventful            Sign Out: Acceptable CV status   Other NRE: NONE   DID A NON-ROUTINE EVENT OCCUR? No           Last vitals:  Vitals Value Taken Time   /88 07/27/21 1310   Temp 36.6  C (97.9  F) 07/27/21 1240   Pulse 70 07/27/21 1313   Resp 25 07/27/21 1313   SpO2 99 % 07/27/21 1313   Vitals shown include unvalidated device data.    Electronically Signed By: Ila Coates MD  July 27, 2021  5:04 PM

## 2021-07-27 NOTE — PROGRESS NOTES
hospitalist consult received; pt is a 60yo female with PMHx hypertension, alcohol use disorder with recent relapse and treatment (currently sober), depression, and OA of left hip who underwent previously scheduled left ALLEGRA. Chart, preop H&P reviewed. At this time, patient does not have chronic medical conditions that require daily monitoring. Hospitalist will sign-off, call with concerns.    Alo Szymanski PA-C  7/27/2021, 2:06 PM  Pager: [phone]

## 2021-07-27 NOTE — ANESTHESIA CARE TRANSFER NOTE
Patient: Shannon Beckwith    Procedure(s):  LEFT TOTAL HIP ARTHROPLASTY    Diagnosis: Primary osteoarthritis of left hip [M16.12]  Diagnosis Additional Information: No value filed.    Anesthesia Type:   Spinal     Note:    Oropharynx: oropharynx clear of all foreign objects and spontaneously breathing  Level of Consciousness: awake  Oxygen Supplementation: face mask  Level of Supplemental Oxygen (L/min / FiO2): 6  Independent Airway: airway patency satisfactory and stable  Dentition: dentition unchanged  Vital Signs Stable: post-procedure vital signs reviewed and stable  Report to RN Given: handoff report given  Patient transferred to: PACU    Handoff Report: Identifed the Patient, Identified the Reponsible Provider, Reviewed the pertinent medical history, Discussed the surgical course, Reviewed Intra-OP anesthesia mangement and issues during anesthesia, Set expectations for post-procedure period and Allowed opportunity for questions and acknowledgement of understanding        Electronically Signed By: JOAO Jessica CRNA  July 27, 2021  11:07 AM

## 2021-07-27 NOTE — ANESTHESIA PROCEDURE NOTES
Intrathecal Procedure Note  Pre-Procedure   Staff -        Anesthesiologist:  Ila Coates MD       Performed By: anesthesiologist       Location: OR       Pre-Anesthestic Checklist: patient identified, risks and benefits discussed, informed consent and pre-op evaluation  Timeout:       Correct Patient: Yes        Correct Procedure: Yes        Correct Site: Yes        Correct Position: Yes   Procedure Documentation  Procedure: intrathecal       Patient Position: sitting       Patient Prep/Sterile Barriers: sterile gloves, mask, patient draped       Skin prep: Betadine       Insertion Site: L3-4. (midline approach).       Needle Gauge: 24.        Needle Length (Inches): 3.5        Spinal Needle Type: Rajwinder-Marty       Introducer used      # of attempts: 1 and  # of redirects:     Assessment/Narrative         Paresthesias: No.       CSF fluid: clear.    Medication(s) Administered   2% Mepivacaine PF (Intrathecal), 1.4 mL  Medication Administration Time: 7/27/2021 9:34 AM    Comments:  Risk IBNLT permanent nerve injury, HA and infection discussed.     X 1 into SAB.  No blood or complications.

## 2021-07-27 NOTE — PROCEDURES
DATE OF SERVICE: 7/27/2021    SURGEON   LUZ SHUKLA MD     FIRST ASSISTANT   AMBAR MARRERO PA-C   Please bill for Mr. Marrero as first assistant as there was no resident available to assist in this case. Assistants were necessary and performed positioning, draping, retraction, suturing and wound dressing tasks throughout the surgical procedure. The assistant was present for the entire procedure.    ASSISTANTS  JARRELL Mao    PREOPERATIVE DIAGNOSIS A  End-stage left hip osteoarthritis.     POSTOPERATIVE DIAGNOSIS   End-stage left hip osteoarthritis.     PROCEDURE   Left total hip arthroplasty using [employer] components, a size 54 cup, a size 36 mm highly cross-linked polyethylene liner, a size 9 high offset Anthology stem and a 0 36 head.     INDICATION FOR SURGERY   Shannon Beckwith [id number] is a 61 year old-year-old female with a long history of increasing left hip pain. Preoperative x-rays showed advanced degenerative changes in the left hip. The patient has failed all reasonable non-operative options and we decided that they would benefit from the above-named procedure. Informed consent was obtained.     FINDINGS   The femoral head had completely flattened out. There was a significant amount of eburnation within the acetabulum as well. There was also significant collapse of the femoral head.     PROCEDURE   Shannon Beckwith was correctly identified by myself in the PAR and their left lower extremity was marked. The patient was taken to the operating room. They were placed under spinal anesthetic. The patient was then placed supine and leg lengths assessed. They were placed in the right lateral decubitus position with the Merida hip-ford. The patient was then prepped with Avagard, followed by a 10 minute Betadine scrub, alcohol paint, DuraPrep and draped in the usual fashion. A time-out was then performed. A posterior incision was made and carried down the IT band. The IT band was then split along  with the gluteal musculature. The Omni-Tract retractor was then placed, giving us excellent exposure. We placed a marking stitch at this point to measure length, as well as clinically measured length. The piriformis was identified. This was dissected with a Bovie off the back of the head, as well as with the capsule. The capsule was teed.  The Omni-Tract retractors were then placed deeper. We then dislocated the femoral head and a femoral cut was made one fingerbreadth above the lesser tuberosity. The leg was then placed in approximately 45 degrees of internal rotation and rested on a Clifford stand.  An anterior hip retractor was placed anteriorly over the rim of the acetabulum, giving us excellent exposure. The cotyloid fossa was removed and followed by reaming medially with a 48 reamer and took this up to a 54 with excellent circumferential bleeding in the 45 degrees of abduction and approximately 15 degrees of anteversion position. The wound was then thoroughly irrigated. We pounded the permanent cup in place and I placed 2 screws and a centerhole cover. The permanent liner was also placed at this time. A Ray-Jake sponge was placed in the acetabulum. Attention was turned to the femur. The femur was placed perpendicular to the floor. The , followed by a T-handle reamer was then used to open up the femoral canal. A rat-tailed rasp was then used to identify the lateral cortex of the femur. The broaching was started at a zero and taken up to a 9. I lateralized this with a lateralizing broach as much as possible, keeping in line with the patient's natural anteversion. The Calcar reamer was then utilized. The Ray-jake sponge was removed. We then trialed the head. It was felt that a 0 was the right length and the patient was stable in all ranges of motion. We used the marking stitch to assess leg length, as well as clinical leg lengths, both appeared to be equal. The trial components were removed. The wound and  femoral canal were thoroughly irrigated. About a third of the vancomycin powder was placed within the femoral canal. The permanent 9 stem was impacted into place. The permanent trunion was then thoroughly irrigated, dried and cleaned. The permanent size 36 head was impacted in place. The hip was reduced. The short external rotators and piriformis were repaired back to the posterior femur using a #5 FiberWire x3 through bone tunnels. The rest of the vancomycin powder was placed in the wound and the IT band was closed with a running #2 Quill; 0 Stratafix and 2-0 Stratafix running sutures were used to close the subdermal fatty and subdermal layers respectively, and the skin was closed with a running 3-0 Quill suture. The Prineo dressing was applied. There were no complications. Sponge and needle counts were correct. The patient was taken to the PAR in stable condition.     SPECIMENS  None    COMPLICATIONS  None    ESTIMATED BLOOD LOSS  100 mL    CONDITION ON DISCHARGE FROM OR  Satisfactory    PLAN  1. Antibiotic Prophylaxis was given included Ancef 2 gm. Antibiotics given within 1 hour of surgical incision and discontinued within 24 hrs.   2. DVT prophylaxis ASA given within 24 hours    3. Weight Bearing WBAT (Weight bearing as tolerated).   4. Discharge anticipated date POD# 1 to Home vs Rehab Facility    5. Pain Medication oxycodone, Tylenol and Celebrex  6. Leave Prineo dressing on upon discharge if applied in OR.7. Routine posterior-approach hip precautions for 6 weeks- no external rotation, no adduction past midline, no hip flexion past 90 degrees.      LUZ SHUKLA MD    @C(1)@ [facility] - [phone]

## 2021-07-28 ENCOUNTER — APPOINTMENT (OUTPATIENT)
Dept: OCCUPATIONAL THERAPY | Facility: CLINIC | Age: 61
End: 2021-07-28
Attending: PHYSICIAN ASSISTANT
Payer: COMMERCIAL

## 2021-07-28 ENCOUNTER — APPOINTMENT (OUTPATIENT)
Dept: PHYSICAL THERAPY | Facility: CLINIC | Age: 61
End: 2021-07-28
Attending: PHYSICIAN ASSISTANT
Payer: COMMERCIAL

## 2021-07-28 VITALS
DIASTOLIC BLOOD PRESSURE: 94 MMHG | SYSTOLIC BLOOD PRESSURE: 138 MMHG | HEIGHT: 69 IN | RESPIRATION RATE: 16 BRPM | TEMPERATURE: 98.4 F | OXYGEN SATURATION: 96 % | HEART RATE: 82 BPM | WEIGHT: 178 LBS | BODY MASS INDEX: 26.36 KG/M2

## 2021-07-28 LAB
GLUCOSE BLDC GLUCOMTR-MCNC: 107 MG/DL (ref 70–99)
HGB BLD-MCNC: 10.6 G/DL (ref 11.7–15.7)

## 2021-07-28 PROCEDURE — 36415 COLL VENOUS BLD VENIPUNCTURE: CPT | Performed by: PHYSICIAN ASSISTANT

## 2021-07-28 PROCEDURE — 85018 HEMOGLOBIN: CPT | Performed by: PHYSICIAN ASSISTANT

## 2021-07-28 PROCEDURE — 250N000011 HC RX IP 250 OP 636: Performed by: PHYSICIAN ASSISTANT

## 2021-07-28 PROCEDURE — 97110 THERAPEUTIC EXERCISES: CPT | Mod: GP | Performed by: PHYSICAL THERAPY ASSISTANT

## 2021-07-28 PROCEDURE — 97530 THERAPEUTIC ACTIVITIES: CPT | Mod: GO | Performed by: OCCUPATIONAL THERAPIST

## 2021-07-28 PROCEDURE — 97535 SELF CARE MNGMENT TRAINING: CPT | Mod: GO | Performed by: OCCUPATIONAL THERAPIST

## 2021-07-28 PROCEDURE — 97165 OT EVAL LOW COMPLEX 30 MIN: CPT | Mod: GO | Performed by: OCCUPATIONAL THERAPIST

## 2021-07-28 PROCEDURE — 97116 GAIT TRAINING THERAPY: CPT | Mod: GP | Performed by: PHYSICAL THERAPY ASSISTANT

## 2021-07-28 PROCEDURE — 250N000013 HC RX MED GY IP 250 OP 250 PS 637: Performed by: PHYSICIAN ASSISTANT

## 2021-07-28 PROCEDURE — 97530 THERAPEUTIC ACTIVITIES: CPT | Mod: GP | Performed by: PHYSICAL THERAPY ASSISTANT

## 2021-07-28 RX ORDER — CYCLOBENZAPRINE HCL 10 MG
10 TABLET ORAL
Qty: 7 TABLET | Refills: 0 | Status: SHIPPED | OUTPATIENT
Start: 2021-07-28 | End: 2021-07-28

## 2021-07-28 RX ORDER — CELECOXIB 200 MG/1
200 CAPSULE ORAL DAILY
Qty: 40 CAPSULE | Refills: 0 | Status: ON HOLD | OUTPATIENT
Start: 2021-07-28 | End: 2024-03-09

## 2021-07-28 RX ORDER — OXYCODONE HYDROCHLORIDE 5 MG/1
5-10 TABLET ORAL
Qty: 30 TABLET | Refills: 0 | Status: SHIPPED | OUTPATIENT
Start: 2021-07-28 | End: 2021-07-28

## 2021-07-28 RX ORDER — IBUPROFEN 200 MG
200 TABLET ORAL 4 TIMES DAILY PRN
Start: 2021-07-28

## 2021-07-28 RX ORDER — OXYCODONE HYDROCHLORIDE 5 MG/1
5-10 TABLET ORAL
Qty: 30 TABLET | Refills: 0 | Status: ON HOLD | OUTPATIENT
Start: 2021-07-28 | End: 2024-03-10

## 2021-07-28 RX ORDER — CYCLOBENZAPRINE HCL 10 MG
10 TABLET ORAL
Refills: 0 | Status: ON HOLD
Start: 2021-07-28 | End: 2024-03-09

## 2021-07-28 RX ORDER — KETOROLAC TROMETHAMINE 10 MG/1
10 TABLET, FILM COATED ORAL EVERY 6 HOURS PRN
Qty: 12 TABLET | Refills: 0 | Status: ON HOLD | OUTPATIENT
Start: 2021-07-28 | End: 2024-03-09

## 2021-07-28 RX ORDER — OXYCODONE HYDROCHLORIDE 5 MG/1
5-10 TABLET ORAL
Qty: 40 TABLET | Refills: 0 | Status: SHIPPED | OUTPATIENT
Start: 2021-07-28 | End: 2021-07-28

## 2021-07-28 RX ADMIN — HYDROMORPHONE HYDROCHLORIDE 0.4 MG: 0.2 INJECTION, SOLUTION INTRAMUSCULAR; INTRAVENOUS; SUBCUTANEOUS at 03:12

## 2021-07-28 RX ADMIN — OXYCODONE HYDROCHLORIDE 10 MG: 5 TABLET ORAL at 10:07

## 2021-07-28 RX ADMIN — ACETAMINOPHEN 975 MG: 325 TABLET, FILM COATED ORAL at 13:30

## 2021-07-28 RX ADMIN — AMLODIPINE BESYLATE 10 MG: 10 TABLET ORAL at 10:08

## 2021-07-28 RX ADMIN — CELECOXIB 100 MG: 100 CAPSULE ORAL at 10:08

## 2021-07-28 RX ADMIN — CEFAZOLIN SODIUM 2 G: 2 INJECTION, SOLUTION INTRAVENOUS at 00:56

## 2021-07-28 RX ADMIN — DOCUSATE SODIUM 100 MG: 100 CAPSULE, LIQUID FILLED ORAL at 10:08

## 2021-07-28 RX ADMIN — FERROUS SULFATE TAB 325 MG (65 MG ELEMENTAL FE) 325 MG: 325 (65 FE) TAB at 10:08

## 2021-07-28 RX ADMIN — ASPIRIN 162 MG: 81 TABLET, COATED ORAL at 10:08

## 2021-07-28 RX ADMIN — ATENOLOL 50 MG: 25 TABLET ORAL at 10:07

## 2021-07-28 RX ADMIN — LEVOTHYROXINE SODIUM 175 MCG: 100 TABLET ORAL at 05:36

## 2021-07-28 RX ADMIN — OXYCODONE HYDROCHLORIDE 10 MG: 5 TABLET ORAL at 13:29

## 2021-07-28 RX ADMIN — OXYCODONE HYDROCHLORIDE 10 MG: 5 TABLET ORAL at 00:56

## 2021-07-28 RX ADMIN — POLYETHYLENE GLYCOL 3350 17 G: 17 POWDER, FOR SOLUTION ORAL at 10:08

## 2021-07-28 RX ADMIN — SENNOSIDES AND DOCUSATE SODIUM 1 TABLET: 8.6; 5 TABLET ORAL at 10:08

## 2021-07-28 RX ADMIN — OXYCODONE HYDROCHLORIDE 10 MG: 5 TABLET ORAL at 05:36

## 2021-07-28 RX ADMIN — FLUOXETINE HYDROCHLORIDE 20 MG: 20 CAPSULE ORAL at 12:28

## 2021-07-28 RX ADMIN — ACETAMINOPHEN 975 MG: 325 TABLET, FILM COATED ORAL at 06:51

## 2021-07-28 ASSESSMENT — ACTIVITIES OF DAILY LIVING (ADL)
PREVIOUS_RESPONSIBILITIES: MEAL PREP;HOUSEKEEPING;LAUNDRY;SHOPPING;FINANCES;MEDICATION MANAGEMENT;DRIVING
DEPENDENT_IADLS:: INDEPENDENT

## 2021-07-28 NOTE — PROGRESS NOTES
Care Management Discharge Note    Discharge Date: 07/28/2021       Discharge Disposition:  home    Discharge Services:  HHPT    Discharge DME:      Discharge Transportation:  Family will provide    Private pay costs discussed: Not applicable    PAS Confirmation Code:  NA  Patient/family educated on Medicare website which has current facility and service quality ratings:      Education Provided on the Discharge Plan:    Persons Notified of Discharge Plans: patient, bedside RN  Patient/Family in Agreement with the Plan:  yes    Handoff Referral Completed: No    Additional Information:  Spoke with patient. Orders are in for HHPT. Patient in agreement. Referral sent to [facility]. Info on AVS

## 2021-07-28 NOTE — CONSULTS
Care Management Initial Consult    General Information  Assessment completed with: Patient,         Primary Care Provider verified and updated as needed:     Readmission within the last 30 days:           Advance Care Planning: Advance Care Planning Reviewed: no concerns identified          Communication Assessment  Patient's communication style: spoken language (English or Bilingual)    Hearing Difficulty or Deaf: no   Wear Glasses or Blind: no    Cognitive  Cognitive/Neuro/Behavioral: WDL              Best Language: 0 - No aphasia       Living Environment:   People in home: alone     Current living Arrangements: apartment      Able to return to prior arrangements:         Family/Social Support:  Care provided by: self  Provides care for: no one     Other (specify) (Friends)          Description of Support System: Supportive, Involved         Current Resources:   Patient receiving home care services:       Community Resources:    Equipment currently used at home: grab bar, tub/shower, raised toilet seat, shower chair, cane, straight, walker, deepali  Supplies currently used at home:      Employment/Financial:  Employment Status:          Financial Concerns:             Lifestyle & Psychosocial Needs:  Social Determinants of Health     Tobacco Use: Medium Risk     Smoking Tobacco Use: Former Smoker     Smokeless Tobacco Use: Unknown   Alcohol Use:      Frequency of Alcohol Consumption:      Average Number of Drinks:      Frequency of Binge Drinking:    Financial Resource Strain:      Difficulty of Paying Living Expenses:    Food Insecurity:      Worried About Running Out of Food in the Last Year:      Ran Out of Food in the Last Year:    Transportation Needs:      Lack of Transportation (Medical):      Lack of Transportation (Non-Medical):    Physical Activity:      Days of Exercise per Week:      Minutes of Exercise per Session:    Stress:      Feeling of Stress :    Social Connections:      Frequency of Communication  with Friends and Family:      Frequency of Social Gatherings with Friends and Family:      Attends Amish Services:      Active Member of Clubs or Organizations:      Attends Club or Organization Meetings:      Marital Status:    Intimate Partner Violence:      Fear of Current or Ex-Partner:      Emotionally Abused:      Physically Abused:      Sexually Abused:    Depression:      PHQ-2 Score:    Housing Stability:      Unable to Pay for Housing in the Last Year:      Number of Places Lived in the Last Year:      Unstable Housing in the Last Year:        Functional Status:  Prior to admission patient needed assistance:   Dependent ADLs:: Independent  Dependent IADLs:: Independent       Mental Health Status:          Chemical Dependency Status:                Values/Beliefs:  Spiritual, Cultural Beliefs, Amish Practices, Values that affect care: no               Additional Information:  Writer spoke with pt. She stated she lives alone and previously was independent with all ADL's. She stated in the last 2 weeks she was needing more assistance and had to have groceries delivered.     Pt stated she is interested in TCU. She stated that she pre-registered at [facility]. Writer sent referral, they stated they do not have a bed until tomorrow. Writer notified Meme BAY, who stated that she will have to check and make sure that is okay and will get back to writer.     ALEXSANDER Reed

## 2021-07-28 NOTE — PROGRESS NOTES
"ORTHOPEDIC LOWER EXTREMITY PROGRESS NOTE    POD#1  Patient is a 61 year old female who underwent Procedure(s):  LEFT TOTAL HIP ARTHROPLASTY on 2021 on left . Pain is well controlled. Tolerating oxycodone well.  Pre-registered at [facility], would prefer SNF vs home.  Lives alone and doesn't have much support.    Vitals:   Blood pressure (!) 138/94, pulse 82, temperature 98.4  F (36.9  C), temperature source Oral, resp. rate 16, height 1.753 m (5' 9\"), weight 80.7 kg (178 lb), SpO2 96 %.  Temp (24hrs), Av.5  F (36.4  C), Min:96.3  F (35.7  C), Max:98.4  F (36.9  C)        Drains: none    EXAM   The patient is alert description: awake and alert.  Calves are soft and non-tender.   Sensation is intact.  Dorsiflexion and plantar flexion is intact.  Foot warm with nl cap refill.  The incision is incision: covered and with prineo dressing.     Labs:   Recent Labs   Lab Test 21  0708 21  0724 21  2106   HGB 10.6* 12.0 12.0       ASSESSMENT  S/p L ALLEGRA   PLAN  1. DVT prophylaxis: anticoagulants: aspirin  2. Weight Bearing: weightbearing: WBAT (Weight bearing as tolerated).  3. Anticipated discharge date today . Discharge to discharge destination: Home vs SNF.  4. Cont Pain Control pain medication: Oxycodone and Tylenol    5. Will consult SW to help with discharge planning    Meme Beckman PA-C  [facility]        "

## 2021-07-28 NOTE — PLAN OF CARE
Patient vital signs are at baseline: yes  Patient able to ambulate as they were prior to admission or with assist devices provided by therapies during their stay: yes  Patient MUST void prior to discharge:  yes  Patient able to tolerate oral intake:  yes  Pain has adequate pain control using Oral analgesics:  no  A/Ox4. VSS on RA. Up A/1 GB/W. Oxy and scheduled tylenol. Dilaudid x1 for breakthrough pain, effective.

## 2021-07-28 NOTE — PROGRESS NOTES
07/28/21 0916   Quick Adds   Type of Visit Initial Occupational Therapy Evaluation   Living Environment   People in home alone   Current Living Arrangements apartment   Home Accessibility no concerns   Transportation Anticipated car, drives self;family or friend will provide   Living Environment Comments Lives in [address]+ apartment. Recently  with a history of ETOH addiction. Her daughter just left an addicition rehab center, may be able to A but pt voiced she could possibly be unrealiable. Pt attends AA meetings.    Self-Care   Usual Activity Tolerance moderate   Current Activity Tolerance moderate   Regular Exercise No   Equipment Currently Used at Home grab bar, tub/shower;raised toilet seat;shower chair;cane, straight;walker, deepali;crutches  (has vanity by toilet )   Activity/Exercise/Self-Care Comment Baseline (I) in ADLs and functional mobility. Recently more difficult due to L hip OA pain.    Instrumental Activities of Daily Living (IADL)   Previous Responsibilities meal prep;housekeeping;laundry;shopping;finances;medication management;driving   IADL Comments Recently has been ordering groceries to her apartment. Baseline (I) with all IADLs.    Disability/Function   Hearing Difficulty or Deaf no   Wear Glasses or Blind no   Concentrating, Remembering or Making Decisions Difficulty no   Difficulty Communicating no   Difficulty Eating/Swallowing no   Walking or Climbing Stairs Difficulty no   Dressing/Bathing Difficulty no   Toileting issues no   Doing Errands Independently Difficulty (such as shopping) no   Fall history within last six months yes   Number of times patient has fallen within last six months 1   Change in Functional Status Since Onset of Current Illness/Injury no   General Information   Onset of Illness/Injury or Date of Surgery 07/27/21   Referring Physician Robert Marrero PA-C   Patient/Family Therapy Goal Statement (OT) SNF for more support due to recent psychosocial history and lack of  support at home.    Additional Occupational Profile Info/Pertinent History of Current Problem POD #1 L ALLEGRA   Existing Precautions/Restrictions no hip ER;no hip ADD past midline;90 degree hip flexion;fall   Left Lower Extremity (Weight-bearing Status) weight-bearing as tolerated (WBAT)   Cognitive Status Examination   Orientation Status orientation to person, place and time   Affect/Mental Status (Cognitive) WNL   Follows Commands WNL   Visual Perception   Visual Impairment/Limitations WNL   Sensory   Sensory Quick Adds No deficits were identified   Pain Assessment   Patient Currently in Pain Yes, see Vital Sign flowsheet  (8/10)   Range of Motion Comprehensive   Comment, General Range of Motion BUE ROM WFL   Strength Comprehensive (MMT)   Comment, General Manual Muscle Testing (MMT) Assessment BUE MMT WFL    Bed Mobility   Bed Mobility supine-sit;sit-supine   Supine-Sit McCormick (Bed Mobility) supervision   Sit-Supine McCormick (Bed Mobility) supervision   Transfers   Transfers sit-stand transfer   Sit-Stand Transfer   Sit-Stand McCormick (Transfers) supervision   Assistive Device (Sit-Stand Transfers) walker, deepali   Activities of Daily Living   BADL Assessment lower body dressing;upper body dressing   Upper Body Dressing Assessment   McCormick Level (Upper Body Dressing) supervision   Lower Body Dressing Assessment   McCormick Level (Lower Body Dressing) moderate assist (50% patient effort)   Position (Lower Body Dressing) edge of bed sitting   Clinical Impression   Criteria for Skilled Therapeutic Interventions Met (OT) yes   OT Diagnosis Impaired (I) with I/ADLs and functional mobillity   OT Problem List-Impairments impacting ADL problems related to;activity tolerance impaired;balance;pain;post-surgical precautions   Assessment of Occupational Performance 1-3 Performance Deficits   Identified Performance Deficits Impaired (I) with dressing, bathing, toilet transfer, etc.    Planned Therapy  Interventions (OT) ADL retraining;progressive activity/exercise;home program guidelines;transfer training   Clinical Decision Making Complexity (OT) low complexity   Therapy Frequency (OT) Daily   Predicted Duration of Therapy 2 days   Risk & Benefits of therapy have been explained evaluation/treatment results reviewed;care plan/treatment goals reviewed;risks/benefits reviewed;current/potential barriers reviewed;participants voiced agreement with care plan;participants included;patient   OT Discharge Planning    OT Rationale for DC Rec Pt limited by pain, balance and activity tolerance. Pt concerned about limited social support at home with recent psychosocial history of divorce and daughter overcoming addiction. Recommend A with I/ADLs and functional mobility to PLOF (ie bathing, dressing, toilet transfer, shower transfer, etc)   OT Brief overview of current status  Supine <> sit SBA, sit <> stand FWW with SBA, lower body dressing with AE SBA   Total Evaluation Time (Minutes)   Total Evaluation Time (Minutes) 10

## 2021-07-28 NOTE — PROGRESS NOTES
[facility]      OUTPATIENT OCCUPATIONAL THERAPY  EVALUATION  PLAN OF TREATMENT FOR OUTPATIENT REHABILITATION  (COMPLETE FOR INITIAL CLAIMS ONLY)  Patient's Last Name, First Name, M.I.  YOB: 1960  Shannon Beckwith                          Provider's Name  [facility] Medical Record No.  [id number]                               Onset Date:  07/27/21   Start of Care Date:  07/28/21     Type:     ___PT   _X_OT   ___SLP Medical Diagnosis:  s/p L ALLEGRA                        OT Diagnosis:  Impaired (I) with I/ADLs and functional mobillity   Visits from SOC:  1   _________________________________________________________________________________  Plan of Treatment/Functional Goals    Planned Interventions: ADL retraining, progressive activity/exercise, home program guidelines, transfer training   Goals: See Occupational Therapy Goals on Care Plan in [facility] electronic health record.    Therapy Frequency: Daily  Predicted Duration of Therapy Intervention: 2 days  _________________________________________________________________________________    I CERTIFY THE NEED FOR THESE SERVICES FURNISHED UNDER        THIS PLAN OF TREATMENT AND WHILE UNDER MY CARE     (Physician co-signature of this document indicates review and certification of the therapy plan).                Certification date from: 07/28/21, Certification date to: 07/29/21    Referring Physician: Robert Marrero PA-C            Initial Assessment        See Occupational Therapy evaluation dated 07/28/21 in Epic electronic health record.

## 2021-07-28 NOTE — PLAN OF CARE
Patient vital signs are at baseline: Yes  Patient able to ambulate as they were prior to admission or with assist devices provided by therapies during their stay:  Yes  Patient MUST void prior to discharge:  Yes  Patient able to tolerate oral intake:  Yes  Pain has adequate pain control using Oral analgesics:  No,  Reason:  Still taking IV dilaudid     A&Ox4. Ambulating to BR A1 w/gb. Tolerating diet. Still rating pain 8/10 and using IV dilaudid. VSS on RA. SL

## 2021-07-28 NOTE — PLAN OF CARE
Occupational Therapy Discharge Summary    Reason for therapy discharge:    Discharged to home with home therapy.    Progress towards therapy goal(s). See goals on Care Plan in [facility] electronic health record for goal details.  Goals partially met.  Barriers to achieving goals:   discharge from facility.    Therapy recommendation(s):    Continued therapy is recommended.  Rationale/Recommendations:  Pt limited by pain, balance and activity tolerance. Pt concerned about limited social support at home with recent psychosocial history of divorce and daughter overcoming addiction. Recommend A with I/ADLs and functional mobility to PLOF (ie bathing, dressing, toilet transfer, shower transfer, etc). Pt will require A with all ADL/IADL's to insure safety from friends/family. Pt reports will likely order AE, ie reacher, sock aide, LSHS and leg  from [facility]. Pt has all needed DME ie RTS, grab bars. Pt will be receiving home PT per chart.

## 2023-05-16 ENCOUNTER — TRANSFERRED RECORDS (OUTPATIENT)
Dept: HEALTH INFORMATION MANAGEMENT | Facility: CLINIC | Age: 63
End: 2023-05-16
Payer: COMMERCIAL

## 2023-05-22 ENCOUNTER — MEDICAL CORRESPONDENCE (OUTPATIENT)
Dept: HEALTH INFORMATION MANAGEMENT | Facility: CLINIC | Age: 63
End: 2023-05-22
Payer: COMMERCIAL

## 2023-05-25 DIAGNOSIS — M81.0 OSTEOPOROSIS, POST-MENOPAUSAL: ICD-10-CM

## 2023-05-25 RX ORDER — EPINEPHRINE 1 MG/ML
0.3 INJECTION, SOLUTION, CONCENTRATE INTRAVENOUS EVERY 5 MIN PRN
OUTPATIENT
Start: 2023-05-26

## 2023-05-25 RX ORDER — MEPERIDINE HYDROCHLORIDE 25 MG/ML
25 INJECTION INTRAMUSCULAR; INTRAVENOUS; SUBCUTANEOUS EVERY 30 MIN PRN
OUTPATIENT
Start: 2023-05-26

## 2023-05-25 RX ORDER — ALBUTEROL SULFATE 0.83 MG/ML
2.5 SOLUTION RESPIRATORY (INHALATION)
OUTPATIENT
Start: 2023-05-26

## 2023-05-25 RX ORDER — ALBUTEROL SULFATE 90 UG/1
1-2 AEROSOL, METERED RESPIRATORY (INHALATION)
Start: 2023-05-26

## 2023-05-25 RX ORDER — METHYLPREDNISOLONE SODIUM SUCCINATE 125 MG/2ML
125 INJECTION, POWDER, LYOPHILIZED, FOR SOLUTION INTRAMUSCULAR; INTRAVENOUS
Start: 2023-05-26

## 2023-05-25 RX ORDER — DIPHENHYDRAMINE HYDROCHLORIDE 50 MG/ML
50 INJECTION INTRAMUSCULAR; INTRAVENOUS
Start: 2023-05-26

## 2023-07-18 NOTE — TELEPHONE ENCOUNTER
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 Recs in [facility]. Location verified.    RECORDS REQUESTED FROM:       Clinic name Comments Records Status Imaging Status   [facility] 11/14/16, 11/7/16, 8/29/16 - NOTE WITH DR. BYRNE  [facility]     IMAGING  3/1/21- CT HEAD   10/20/16- XR CHEST  [facility]  PACS

## 2023-08-16 ENCOUNTER — TELEPHONE (OUTPATIENT)
Dept: OTOLARYNGOLOGY | Facility: CLINIC | Age: 63
End: 2023-08-16
Payer: COMMERCIAL

## 2023-08-16 NOTE — TELEPHONE ENCOUNTER
Left vm message for patient in regards to offering sooner appointment with provider due to last minute cancellation. Writers call back number provided on vm.

## 2023-08-26 ENCOUNTER — HEALTH MAINTENANCE LETTER (OUTPATIENT)
Age: 63
End: 2023-08-26

## 2023-10-16 ENCOUNTER — PRE VISIT (OUTPATIENT)
Dept: OTOLARYNGOLOGY | Facility: CLINIC | Age: 63
End: 2023-10-16

## 2023-11-29 NOTE — TELEPHONE ENCOUNTER
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 Recs in [facility]. Location verified     RECORDS REQUESTED FROM:       Clinic name Comments Records Status Imaging Status   [facility] 11/14/16, 11/7/16, 8/29/16 - NOTE WITH DR. BYRNE  [facility]      IMAGING  3/1/21- CT HEAD   10/20/16- XR CHEST  [facility]  PACS

## 2024-01-16 ENCOUNTER — TELEPHONE (OUTPATIENT)
Dept: OTOLARYNGOLOGY | Facility: CLINIC | Age: 64
End: 2024-01-16
Payer: COMMERCIAL

## 2024-01-26 ENCOUNTER — MYC MEDICAL ADVICE (OUTPATIENT)
Dept: OTOLARYNGOLOGY | Facility: CLINIC | Age: 64
End: 2024-01-26
Payer: COMMERCIAL

## 2024-01-29 ENCOUNTER — PRE VISIT (OUTPATIENT)
Dept: OTOLARYNGOLOGY | Facility: CLINIC | Age: 64
End: 2024-01-29

## 2024-01-29 ENCOUNTER — PATIENT OUTREACH (OUTPATIENT)
Dept: OTOLARYNGOLOGY | Facility: CLINIC | Age: 64
End: 2024-01-29

## 2024-01-29 NOTE — PROGRESS NOTES
Called patient to check in after appointment was cancelled given patient is an airway patient. LVM and requested patient call back direct number provided to reschedule. Areli Campoverde RN on 1/29/2024 at 10:28 AM

## 2024-02-02 ENCOUNTER — PATIENT OUTREACH (OUTPATIENT)
Dept: OTOLARYNGOLOGY | Facility: CLINIC | Age: 64
End: 2024-02-02
Payer: COMMERCIAL

## 2024-02-02 NOTE — PROGRESS NOTES
Called patient to check in as she was not able to make her last appointment. LVM and requested call back. Second attempt to reach patient. Areli Campoverde RN on 2/2/2024 at 11:03 AM

## 2024-02-21 ENCOUNTER — NURSE TRIAGE (OUTPATIENT)
Dept: NURSING | Facility: CLINIC | Age: 64
End: 2024-02-21
Payer: COMMERCIAL

## 2024-02-21 NOTE — TELEPHONE ENCOUNTER
"Answer Assessment - Initial Assessment Questions  1. RESPIRATORY STATUS: \"Describe your breathing?\" (e.g., wheezing, shortness of breath, unable to speak, severe coughing)      Breathing noisy, wordk hard for deep breaths,breathing difficult w exertion.  2. ONSET: \"When did this breathing problem begin?\"       3-4 wks.  3. PATTERN \"Does the difficult breathing come and go, or has it been constant since it started?\"       Intermittent. Now constant  4. SEVERITY: \"How bad is your breathing?\" (e.g., mild, moderate, severe)     - MILD: No SOB at rest, mild SOB with walking, speaks normally in sentences, can lie down, no retractions, pulse < 100.     - MODERATE: SOB at rest, SOB with minimal exertion and prefers to sit, cannot lie down flat, speaks in phrases, mild retractions, audible wheezing, pulse 100-120.     - SEVERE: Very SOB at rest, speaks in single words, struggling to breathe, sitting hunched forward, retractions, pulse > 120   Moderate. Worsening w activity  5. RECURRENT SYMPTOM: \"Have you had difficulty breathing before?\" If Yes, ask: \"When was the last time?\" and \"What happened that time?\"       Had surgery  w ent  2x   6. CARDIAC HISTORY: \"Do you have any history of heart disease?\" (e.g., heart attack, angina, bypass surgery, angioplasty)      [facility]    7. LUNG HISTORY: \"Do you have any history of lung disease?\"  (e.g., pulmonary embolus, asthma, emphysema)    no  8. CAUSE: \"What do you think is causing the breathing problem?\"     Subglottis stenosis from chart.  Feels like airway closing. Is getting air.not gasping for breaths.  9. OTHER SYMPTOMS: \"Do you have any other symptoms? (e.g., dizziness, runny nose, cough, chest pain, fever)      Fatigue. Hx alcolholism,has had relapse, wants to get healthy    10. O2 SATURATION MONITOR:  \"Do you use an oxygen saturation monitor (pulse oximeter) at home?\" If Yes, ask: \"What is your reading (oxygen level) today?\" " "\"What is your usual oxygen saturation reading?\" (e.g., 95%)        P 99 sat uncertain  11. PREGNANCY: \"Is there any chance you are pregnant?\" \"When was your last menstrual period?\"        no  12. TRAVEL: \"Have you traveled out of the country in the last month?\" (e.g., travel history, exposures)        no    Protocols used: Breathing Difficulty-A-[place]    "

## 2024-02-22 ENCOUNTER — PATIENT OUTREACH (OUTPATIENT)
Dept: OTOLARYNGOLOGY | Facility: CLINIC | Age: 64
End: 2024-02-22
Payer: COMMERCIAL

## 2024-02-22 ENCOUNTER — TELEPHONE (OUTPATIENT)
Dept: OTOLARYNGOLOGY | Facility: CLINIC | Age: 64
End: 2024-02-22
Payer: COMMERCIAL

## 2024-02-22 NOTE — TELEPHONE ENCOUNTER
Called patient to check in after getting message she had been in the ER. Writer called patient, per patient she is having SOB and her breathing patient stated that she has not been doing well and that's why she has not been able to attend her follow up. Writer offered patient alternative date and time for appointment. Patient was agreeable and verbalized understanding of the situation. Areli Campoverde RN on 2/22/2024 at 9:14 AM

## 2024-02-22 NOTE — PROGRESS NOTES
Called patient to ask her to come to her appointment NPO tomorrow. Given patient is having wheezing and shortness of breath she may need surgery urgently. Patient was agreeable and verbalized understanding of the situation. Areli Campoverde RN on 2/22/2024 at 3:03 PM

## 2024-02-22 NOTE — TELEPHONE ENCOUNTER
Health Call Center    Phone Message    May a detailed message be left on voicemail: yes     Reason for Call: Other: Pt has appt in fall with Dr Del Rio, She said her airway is closing up and she was in the ER at [facility] in [place] and they advised her to reach out to Dr Del Rio team to see if she can be seen sooner.  Please call pt back, she does have the paperwork from the ER.  [facility] [place], Thanks     Action Taken: Other: ENT    Travel Screening: Not Applicable

## 2024-02-23 ENCOUNTER — PATIENT OUTREACH (OUTPATIENT)
Dept: OTOLARYNGOLOGY | Facility: CLINIC | Age: 64
End: 2024-02-23

## 2024-02-23 NOTE — PROGRESS NOTES
Called patient regarding cancellation of appointment. Writer informed patient she needed to come to the appointment today as her breathing is worse, she is experiencing wheezing, and was in the ER recently regarding her breathing. Patient verbalized understanding of the concerns and stated she would attend her appointment and verbalized understanding about the risks of not attending her appointment. Areli Campoverde RN on 2/23/2024 at 8:55 AM

## 2024-02-23 NOTE — PROGRESS NOTES
Called patient as she cancelled her appointment again after writer explaining the importance of attending. Call was forwarded to [facility]. Writer left message explaining again that not coming to this appointment puts her at risk for an emergent breathing complication, and if this happened it can be the case where her typical provider may not be available and she would have a different surgeon doing her case. Writer asked patient to reach out if there are barriers to her coming to her appointments that she reach out as writer is happy to help with any issue preventing her from coming. LVM and provided direct number for call back. Areli Campoverde RN on 2/23/2024 at 10:16 AM

## 2024-03-05 ENCOUNTER — PATIENT OUTREACH (OUTPATIENT)
Dept: OTOLARYNGOLOGY | Facility: CLINIC | Age: 64
End: 2024-03-05
Payer: COMMERCIAL

## 2024-03-05 NOTE — PROGRESS NOTES
Called patient to reschedule appointment. Patient was agreeable to the date and time. Patient will come NPO per provider as patient may need emergent surgery given the condition. Patient was agreeable and verbalized understanding. Writer let patient know that if she does not come to this appointment her provider will not be able to see her until April, and that could mean she would have a different surgeon. Patient verbalized understanding of the importance of her attending this appointment. Areli Campoverde RN on 3/5/2024 at 10:09 AM

## 2024-03-08 ENCOUNTER — ANESTHESIA (OUTPATIENT)
Dept: SURGERY | Facility: CLINIC | Age: 64
DRG: 144 | End: 2024-03-08
Payer: COMMERCIAL

## 2024-03-08 ENCOUNTER — OFFICE VISIT (OUTPATIENT)
Dept: OTOLARYNGOLOGY | Facility: CLINIC | Age: 64
End: 2024-03-08
Payer: COMMERCIAL

## 2024-03-08 ENCOUNTER — PATIENT OUTREACH (OUTPATIENT)
Dept: OTOLARYNGOLOGY | Facility: CLINIC | Age: 64
End: 2024-03-08

## 2024-03-08 ENCOUNTER — HOSPITAL ENCOUNTER (INPATIENT)
Facility: CLINIC | Age: 64
LOS: 2 days | Discharge: HOME | DRG: 144 | End: 2024-03-10
Attending: OTOLARYNGOLOGY | Admitting: OTOLARYNGOLOGY
Payer: COMMERCIAL

## 2024-03-08 ENCOUNTER — ANESTHESIA EVENT (OUTPATIENT)
Dept: SURGERY | Facility: CLINIC | Age: 64
DRG: 144 | End: 2024-03-08
Payer: COMMERCIAL

## 2024-03-08 VITALS
BODY MASS INDEX: 29.62 KG/M2 | HEART RATE: 86 BPM | WEIGHT: 200 LBS | HEIGHT: 69 IN | DIASTOLIC BLOOD PRESSURE: 103 MMHG | SYSTOLIC BLOOD PRESSURE: 141 MMHG

## 2024-03-08 DIAGNOSIS — J38.6 SUBGLOTTIC STENOSIS: Primary | ICD-10-CM

## 2024-03-08 DIAGNOSIS — Z98.84 HISTORY OF GASTRIC BYPASS: ICD-10-CM

## 2024-03-08 DIAGNOSIS — R49.0 DYSPHONIA: ICD-10-CM

## 2024-03-08 DIAGNOSIS — F10.20 ALCOHOLISM (H): ICD-10-CM

## 2024-03-08 DIAGNOSIS — F32.A DEPRESSION, UNSPECIFIED DEPRESSION TYPE: ICD-10-CM

## 2024-03-08 DIAGNOSIS — I48.20 CHRONIC ATRIAL FIBRILLATION (H): ICD-10-CM

## 2024-03-08 DIAGNOSIS — F10.90 ALCOHOL USE DISORDER: ICD-10-CM

## 2024-03-08 DIAGNOSIS — F41.9 ANXIETY: ICD-10-CM

## 2024-03-08 PROBLEM — R06.03 RESPIRATORY DISTRESS: Status: ACTIVE | Noted: 2024-03-08

## 2024-03-08 LAB
ANION GAP SERPL CALCULATED.3IONS-SCNC: 11 MMOL/L (ref 7–15)
APTT PPP: 28 SECONDS (ref 22–38)
BUN SERPL-MCNC: 10.1 MG/DL (ref 8–23)
CALCIUM SERPL-MCNC: 8.9 MG/DL (ref 8.8–10.2)
CHLORIDE SERPL-SCNC: 103 MMOL/L (ref 98–107)
CREAT SERPL-MCNC: 0.58 MG/DL (ref 0.51–0.95)
DEPRECATED HCO3 PLAS-SCNC: 23 MMOL/L (ref 22–29)
EGFRCR SERPLBLD CKD-EPI 2021: >90 ML/MIN/1.73M2
ERYTHROCYTE [DISTWIDTH] IN BLOOD BY AUTOMATED COUNT: 16.1 % (ref 10–15)
GLUCOSE SERPL-MCNC: 105 MG/DL (ref 70–99)
HCT VFR BLD AUTO: 36.8 % (ref 35–47)
HGB BLD-MCNC: 12.2 G/DL (ref 11.7–15.7)
INR PPP: 1.1 (ref 0.85–1.15)
MAGNESIUM SERPL-MCNC: 2.6 MG/DL (ref 1.7–2.3)
MCH RBC QN AUTO: 30.4 PG (ref 26.5–33)
MCHC RBC AUTO-ENTMCNC: 33.2 G/DL (ref 31.5–36.5)
MCV RBC AUTO: 92 FL (ref 78–100)
PHOSPHATE SERPL-MCNC: 4 MG/DL (ref 2.5–4.5)
PLATELET # BLD AUTO: 224 10E3/UL (ref 150–450)
POTASSIUM SERPL-SCNC: 4.1 MMOL/L (ref 3.4–5.3)
RBC # BLD AUTO: 4.01 10E6/UL (ref 3.8–5.2)
SODIUM SERPL-SCNC: 137 MMOL/L (ref 135–145)
WBC # BLD AUTO: 5.8 10E3/UL (ref 4–11)

## 2024-03-08 PROCEDURE — 250N000013 HC RX MED GY IP 250 OP 250 PS 637

## 2024-03-08 PROCEDURE — 31526 DX LARYNGOSCOPY W/OPER SCOPE: CPT | Performed by: NURSE ANESTHETIST, CERTIFIED REGISTERED

## 2024-03-08 PROCEDURE — 250N000011 HC RX IP 250 OP 636: Performed by: ANESTHESIOLOGY

## 2024-03-08 PROCEDURE — 93005 ELECTROCARDIOGRAM TRACING: CPT

## 2024-03-08 PROCEDURE — 84100 ASSAY OF PHOSPHORUS: CPT

## 2024-03-08 PROCEDURE — 85027 COMPLETE CBC AUTOMATED: CPT

## 2024-03-08 PROCEDURE — 250N000011 HC RX IP 250 OP 636

## 2024-03-08 PROCEDURE — 80048 BASIC METABOLIC PNL TOTAL CA: CPT

## 2024-03-08 PROCEDURE — 31622 DX BRONCHOSCOPE/WASH: CPT | Performed by: OTOLARYNGOLOGY

## 2024-03-08 PROCEDURE — 250N000009 HC RX 250

## 2024-03-08 PROCEDURE — 250N000009 HC RX 250: Performed by: ANESTHESIOLOGY

## 2024-03-08 PROCEDURE — 83735 ASSAY OF MAGNESIUM: CPT

## 2024-03-08 PROCEDURE — 85730 THROMBOPLASTIN TIME PARTIAL: CPT

## 2024-03-08 PROCEDURE — 99254 IP/OBS CNSLTJ NEW/EST MOD 60: CPT | Mod: FS | Performed by: INTERNAL MEDICINE

## 2024-03-08 PROCEDURE — 99204 OFFICE O/P NEW MOD 45 MIN: CPT | Mod: 25 | Performed by: OTOLARYNGOLOGY

## 2024-03-08 PROCEDURE — 2894A VOIDCORRECT: CPT | Mod: GC | Performed by: OTOLARYNGOLOGY

## 2024-03-08 PROCEDURE — 258N000003 HC RX IP 258 OP 636: Performed by: NURSE ANESTHETIST, CERTIFIED REGISTERED

## 2024-03-08 PROCEDURE — 360N000076 HC SURGERY LEVEL 3, PER MIN: Performed by: OTOLARYNGOLOGY

## 2024-03-08 PROCEDURE — 36415 COLL VENOUS BLD VENIPUNCTURE: CPT

## 2024-03-08 PROCEDURE — 370N000017 HC ANESTHESIA TECHNICAL FEE, PER MIN: Performed by: OTOLARYNGOLOGY

## 2024-03-08 PROCEDURE — 250N000009 HC RX 250: Performed by: NURSE ANESTHETIST, CERTIFIED REGISTERED

## 2024-03-08 PROCEDURE — 31526 DX LARYNGOSCOPY W/OPER SCOPE: CPT | Performed by: ANESTHESIOLOGY

## 2024-03-08 PROCEDURE — 999N000141 HC STATISTIC PRE-PROCEDURE NURSING ASSESSMENT: Performed by: OTOLARYNGOLOGY

## 2024-03-08 PROCEDURE — 200N000002 HC R&B ICU UMMC

## 2024-03-08 PROCEDURE — 85610 PROTHROMBIN TIME: CPT

## 2024-03-08 PROCEDURE — 250N000011 HC RX IP 250 OP 636: Performed by: NURSE ANESTHETIST, CERTIFIED REGISTERED

## 2024-03-08 PROCEDURE — HZ2ZZZZ DETOXIFICATION SERVICES FOR SUBSTANCE ABUSE TREATMENT: ICD-10-PCS

## 2024-03-08 PROCEDURE — 250N000011 HC RX IP 250 OP 636: Performed by: OTOLARYNGOLOGY

## 2024-03-08 PROCEDURE — 99207 PR APP CREDIT; MD BILLING SHARED VISIT: CPT

## 2024-03-08 RX ORDER — SODIUM CHLORIDE, SODIUM LACTATE, POTASSIUM CHLORIDE, CALCIUM CHLORIDE 600; 310; 30; 20 MG/100ML; MG/100ML; MG/100ML; MG/100ML
INJECTION, SOLUTION INTRAVENOUS CONTINUOUS
Status: DISCONTINUED | OUTPATIENT
Start: 2024-03-08 | End: 2024-03-09 | Stop reason: HOSPADM

## 2024-03-08 RX ORDER — DEXAMETHASONE SODIUM PHOSPHATE 4 MG/ML
8 INJECTION, SOLUTION INTRA-ARTICULAR; INTRALESIONAL; INTRAMUSCULAR; INTRAVENOUS; SOFT TISSUE EVERY 8 HOURS
Status: COMPLETED | OUTPATIENT
Start: 2024-03-08 | End: 2024-03-09

## 2024-03-08 RX ORDER — DEXAMETHASONE SODIUM PHOSPHATE 10 MG/ML
10 INJECTION, SOLUTION INTRAMUSCULAR; INTRAVENOUS ONCE
Status: DISCONTINUED | OUTPATIENT
Start: 2024-03-08 | End: 2024-03-08 | Stop reason: HOSPADM

## 2024-03-08 RX ORDER — METOPROLOL TARTRATE 50 MG
150 TABLET ORAL DAILY
Status: CANCELLED | OUTPATIENT
Start: 2024-03-08

## 2024-03-08 RX ORDER — FENTANYL CITRATE 50 UG/ML
50 INJECTION, SOLUTION INTRAMUSCULAR; INTRAVENOUS EVERY 5 MIN PRN
Status: DISCONTINUED | OUTPATIENT
Start: 2024-03-08 | End: 2024-03-09 | Stop reason: HOSPADM

## 2024-03-08 RX ORDER — METOPROLOL TARTRATE 1 MG/ML
5 INJECTION, SOLUTION INTRAVENOUS ONCE
Status: COMPLETED | OUTPATIENT
Start: 2024-03-08 | End: 2024-03-08

## 2024-03-08 RX ORDER — ATENOLOL 50 MG/1
50 TABLET ORAL EVERY EVENING
Status: CANCELLED | OUTPATIENT
Start: 2024-03-08

## 2024-03-08 RX ORDER — CLONIDINE HYDROCHLORIDE 0.1 MG/1
0.1 TABLET ORAL EVERY 8 HOURS
Status: DISCONTINUED | OUTPATIENT
Start: 2024-03-08 | End: 2024-03-08

## 2024-03-08 RX ORDER — METOPROLOL TARTRATE 50 MG
150 TABLET ORAL 2 TIMES DAILY
Status: DISCONTINUED | OUTPATIENT
Start: 2024-03-08 | End: 2024-03-10 | Stop reason: HOSPADM

## 2024-03-08 RX ORDER — LEVOTHYROXINE SODIUM 175 UG/1
175 TABLET ORAL DAILY
Status: DISCONTINUED | OUTPATIENT
Start: 2024-03-08 | End: 2024-03-10 | Stop reason: HOSPADM

## 2024-03-08 RX ORDER — HYDRALAZINE HYDROCHLORIDE 20 MG/ML
2 INJECTION INTRAMUSCULAR; INTRAVENOUS EVERY 10 MIN PRN
Status: DISCONTINUED | OUTPATIENT
Start: 2024-03-08 | End: 2024-03-09 | Stop reason: HOSPADM

## 2024-03-08 RX ORDER — NALOXONE HYDROCHLORIDE 0.4 MG/ML
0.1 INJECTION, SOLUTION INTRAMUSCULAR; INTRAVENOUS; SUBCUTANEOUS
Status: CANCELLED | OUTPATIENT
Start: 2024-03-08

## 2024-03-08 RX ORDER — ESCITALOPRAM OXALATE 10 MG/1
10 TABLET ORAL DAILY
COMMUNITY

## 2024-03-08 RX ORDER — HYDROMORPHONE HCL IN WATER/PF 6 MG/30 ML
0.2 PATIENT CONTROLLED ANALGESIA SYRINGE INTRAVENOUS EVERY 5 MIN PRN
Status: DISCONTINUED | OUTPATIENT
Start: 2024-03-08 | End: 2024-03-09 | Stop reason: HOSPADM

## 2024-03-08 RX ORDER — HYDROMORPHONE HCL IN WATER/PF 6 MG/30 ML
0.4 PATIENT CONTROLLED ANALGESIA SYRINGE INTRAVENOUS EVERY 5 MIN PRN
Status: DISCONTINUED | OUTPATIENT
Start: 2024-03-08 | End: 2024-03-09 | Stop reason: HOSPADM

## 2024-03-08 RX ORDER — GABAPENTIN 300 MG/1
300 CAPSULE ORAL EVERY 8 HOURS
Status: DISCONTINUED | OUTPATIENT
Start: 2024-03-14 | End: 2024-03-08

## 2024-03-08 RX ORDER — LIDOCAINE 40 MG/G
CREAM TOPICAL
Status: CANCELLED | OUTPATIENT
Start: 2024-03-08

## 2024-03-08 RX ORDER — ONDANSETRON 4 MG/1
4 TABLET, ORALLY DISINTEGRATING ORAL EVERY 6 HOURS PRN
Status: CANCELLED | OUTPATIENT
Start: 2024-03-08

## 2024-03-08 RX ORDER — OXYCODONE HYDROCHLORIDE 5 MG/1
5 TABLET ORAL EVERY 6 HOURS PRN
Status: DISCONTINUED | OUTPATIENT
Start: 2024-03-08 | End: 2024-03-10 | Stop reason: HOSPADM

## 2024-03-08 RX ORDER — METOPROLOL TARTRATE 100 MG
150 TABLET ORAL 2 TIMES DAILY
COMMUNITY
Start: 2024-01-16

## 2024-03-08 RX ORDER — FLUMAZENIL 0.1 MG/ML
0.2 INJECTION, SOLUTION INTRAVENOUS
Status: DISCONTINUED | OUTPATIENT
Start: 2024-03-08 | End: 2024-03-09

## 2024-03-08 RX ORDER — FOLIC ACID 1 MG/1
1 TABLET ORAL DAILY
Status: DISCONTINUED | OUTPATIENT
Start: 2024-03-08 | End: 2024-03-10 | Stop reason: HOSPADM

## 2024-03-08 RX ORDER — ONDANSETRON 2 MG/ML
4 INJECTION INTRAMUSCULAR; INTRAVENOUS EVERY 30 MIN PRN
Status: DISCONTINUED | OUTPATIENT
Start: 2024-03-08 | End: 2024-03-09 | Stop reason: HOSPADM

## 2024-03-08 RX ORDER — LEVOTHYROXINE SODIUM 175 UG/1
175 TABLET ORAL DAILY
Status: CANCELLED | OUTPATIENT
Start: 2024-03-08

## 2024-03-08 RX ORDER — SODIUM CHLORIDE, SODIUM LACTATE, POTASSIUM CHLORIDE, CALCIUM CHLORIDE 600; 310; 30; 20 MG/100ML; MG/100ML; MG/100ML; MG/100ML
INJECTION, SOLUTION INTRAVENOUS CONTINUOUS PRN
Status: DISCONTINUED | OUTPATIENT
Start: 2024-03-08 | End: 2024-03-08

## 2024-03-08 RX ORDER — FERROUS SULFATE 325(65) MG
325 TABLET ORAL 2 TIMES DAILY
Status: DISCONTINUED | OUTPATIENT
Start: 2024-03-08 | End: 2024-03-10 | Stop reason: HOSPADM

## 2024-03-08 RX ORDER — AMLODIPINE BESYLATE 10 MG/1
10 TABLET ORAL EVERY MORNING
Status: CANCELLED | OUTPATIENT
Start: 2024-03-09

## 2024-03-08 RX ORDER — ASPIRIN 81 MG/1
162 TABLET ORAL DAILY
Status: DISCONTINUED | OUTPATIENT
Start: 2024-03-08 | End: 2024-03-10 | Stop reason: HOSPADM

## 2024-03-08 RX ORDER — ONDANSETRON 2 MG/ML
4 INJECTION INTRAMUSCULAR; INTRAVENOUS EVERY 30 MIN PRN
Status: CANCELLED | OUTPATIENT
Start: 2024-03-08

## 2024-03-08 RX ORDER — GABAPENTIN 100 MG/1
100 CAPSULE ORAL EVERY 8 HOURS
Status: DISCONTINUED | OUTPATIENT
Start: 2024-03-16 | End: 2024-03-08

## 2024-03-08 RX ORDER — SENNOSIDES 8.6 MG
8.6 TABLET ORAL 2 TIMES DAILY
Status: DISCONTINUED | OUTPATIENT
Start: 2024-03-08 | End: 2024-03-10 | Stop reason: HOSPADM

## 2024-03-08 RX ORDER — HALOPERIDOL 5 MG/ML
2.5-5 INJECTION INTRAMUSCULAR EVERY 6 HOURS PRN
Status: DISCONTINUED | OUTPATIENT
Start: 2024-03-08 | End: 2024-03-09

## 2024-03-08 RX ORDER — NALOXONE HYDROCHLORIDE 0.4 MG/ML
0.1 INJECTION, SOLUTION INTRAMUSCULAR; INTRAVENOUS; SUBCUTANEOUS
Status: DISCONTINUED | OUTPATIENT
Start: 2024-03-08 | End: 2024-03-09 | Stop reason: HOSPADM

## 2024-03-08 RX ORDER — OXYCODONE HYDROCHLORIDE 10 MG/1
10 TABLET ORAL
Status: CANCELLED | OUTPATIENT
Start: 2024-03-08

## 2024-03-08 RX ORDER — AMPICILLIN AND SULBACTAM 1; .5 G/1; G/1
1.5 INJECTION, POWDER, FOR SOLUTION INTRAMUSCULAR; INTRAVENOUS SEE ADMIN INSTRUCTIONS
Status: DISCONTINUED | OUTPATIENT
Start: 2024-03-08 | End: 2024-03-08 | Stop reason: HOSPADM

## 2024-03-08 RX ORDER — HYDROCHLOROTHIAZIDE 25 MG/1
25 TABLET ORAL DAILY
Status: CANCELLED | OUTPATIENT
Start: 2024-03-08

## 2024-03-08 RX ORDER — DIAZEPAM 5 MG
10 TABLET ORAL EVERY 30 MIN PRN
Status: DISCONTINUED | OUTPATIENT
Start: 2024-03-08 | End: 2024-03-09

## 2024-03-08 RX ORDER — ACETAMINOPHEN 325 MG/1
650 TABLET ORAL EVERY 4 HOURS PRN
Status: CANCELLED | OUTPATIENT
Start: 2024-03-11

## 2024-03-08 RX ORDER — CARBOXYMETHYLCELLULOSE SODIUM 5 MG/ML
1-2 SOLUTION/ DROPS OPHTHALMIC
COMMUNITY
Start: 2023-10-24

## 2024-03-08 RX ORDER — AMPICILLIN AND SULBACTAM 2; 1 G/1; G/1
3 INJECTION, POWDER, FOR SOLUTION INTRAMUSCULAR; INTRAVENOUS
Status: COMPLETED | OUTPATIENT
Start: 2024-03-08 | End: 2024-03-08

## 2024-03-08 RX ORDER — FENTANYL CITRATE 50 UG/ML
25 INJECTION, SOLUTION INTRAMUSCULAR; INTRAVENOUS EVERY 5 MIN PRN
Status: DISCONTINUED | OUTPATIENT
Start: 2024-03-08 | End: 2024-03-09 | Stop reason: HOSPADM

## 2024-03-08 RX ORDER — ONDANSETRON 4 MG/1
4 TABLET, ORALLY DISINTEGRATING ORAL EVERY 30 MIN PRN
Status: DISCONTINUED | OUTPATIENT
Start: 2024-03-08 | End: 2024-03-09 | Stop reason: HOSPADM

## 2024-03-08 RX ORDER — FOLIC ACID 1 MG/1
1 TABLET ORAL DAILY
COMMUNITY
Start: 2022-04-21

## 2024-03-08 RX ORDER — GABAPENTIN 100 MG/1
CAPSULE ORAL
COMMUNITY
Start: 2024-01-22

## 2024-03-08 RX ORDER — ONDANSETRON 2 MG/ML
4 INJECTION INTRAMUSCULAR; INTRAVENOUS EVERY 6 HOURS PRN
Status: CANCELLED | OUTPATIENT
Start: 2024-03-08

## 2024-03-08 RX ORDER — AMOXICILLIN 250 MG
1 CAPSULE ORAL 2 TIMES DAILY
Status: CANCELLED | OUTPATIENT
Start: 2024-03-08

## 2024-03-08 RX ORDER — LIDOCAINE 40 MG/G
CREAM TOPICAL
Status: DISCONTINUED | OUTPATIENT
Start: 2024-03-08 | End: 2024-03-10 | Stop reason: HOSPADM

## 2024-03-08 RX ORDER — APIXABAN 5 MG/1
TABLET, FILM COATED ORAL
COMMUNITY

## 2024-03-08 RX ORDER — FOLIC ACID 1 MG/1
1 TABLET ORAL DAILY
Status: CANCELLED | OUTPATIENT
Start: 2024-03-08

## 2024-03-08 RX ORDER — BISACODYL 10 MG
10 SUPPOSITORY, RECTAL RECTAL DAILY PRN
Status: CANCELLED | OUTPATIENT
Start: 2024-03-11

## 2024-03-08 RX ORDER — ESCITALOPRAM OXALATE 10 MG/1
10 TABLET ORAL DAILY
Status: DISCONTINUED | OUTPATIENT
Start: 2024-03-08 | End: 2024-03-10 | Stop reason: HOSPADM

## 2024-03-08 RX ORDER — NALTREXONE HYDROCHLORIDE 50 MG/1
0.5 TABLET, FILM COATED ORAL AT BEDTIME
COMMUNITY
Start: 2023-08-16

## 2024-03-08 RX ORDER — METOPROLOL TARTRATE 50 MG
150 TABLET ORAL 2 TIMES DAILY
Status: DISCONTINUED | OUTPATIENT
Start: 2024-03-08 | End: 2024-03-08

## 2024-03-08 RX ORDER — DIAZEPAM 10 MG/2ML
5-10 INJECTION, SOLUTION INTRAMUSCULAR; INTRAVENOUS EVERY 30 MIN PRN
Status: DISCONTINUED | OUTPATIENT
Start: 2024-03-08 | End: 2024-03-09

## 2024-03-08 RX ORDER — ONDANSETRON 4 MG/1
4 TABLET, ORALLY DISINTEGRATING ORAL EVERY 30 MIN PRN
Status: CANCELLED | OUTPATIENT
Start: 2024-03-08

## 2024-03-08 RX ORDER — AMLODIPINE BESYLATE 5 MG/1
5 TABLET ORAL EVERY EVENING
Status: DISCONTINUED | OUTPATIENT
Start: 2024-03-08 | End: 2024-03-10 | Stop reason: HOSPADM

## 2024-03-08 RX ORDER — MULTIPLE VITAMINS W/ MINERALS TAB 9MG-400MCG
1 TAB ORAL DAILY
Status: DISCONTINUED | OUTPATIENT
Start: 2024-03-09 | End: 2024-03-09

## 2024-03-08 RX ORDER — ESMOLOL HYDROCHLORIDE 10 MG/ML
INJECTION INTRAVENOUS PRN
Status: DISCONTINUED | OUTPATIENT
Start: 2024-03-08 | End: 2024-03-08

## 2024-03-08 RX ORDER — AMLODIPINE BESYLATE 10 MG/1
10 TABLET ORAL EVERY EVENING
Status: DISCONTINUED | OUTPATIENT
Start: 2024-03-08 | End: 2024-03-08

## 2024-03-08 RX ORDER — OXYCODONE HYDROCHLORIDE 5 MG/1
5 TABLET ORAL
Status: CANCELLED | OUTPATIENT
Start: 2024-03-08

## 2024-03-08 RX ORDER — GABAPENTIN 600 MG/1
1200 TABLET ORAL ONCE
Status: DISCONTINUED | OUTPATIENT
Start: 2024-03-08 | End: 2024-03-08

## 2024-03-08 RX ORDER — ACETAMINOPHEN 325 MG/1
650 TABLET ORAL EVERY 4 HOURS PRN
Status: DISCONTINUED | OUTPATIENT
Start: 2024-03-08 | End: 2024-03-10 | Stop reason: HOSPADM

## 2024-03-08 RX ORDER — OLANZAPINE 5 MG/1
5-10 TABLET, ORALLY DISINTEGRATING ORAL EVERY 6 HOURS PRN
Status: DISCONTINUED | OUTPATIENT
Start: 2024-03-08 | End: 2024-03-09

## 2024-03-08 RX ORDER — GABAPENTIN 300 MG/1
600 CAPSULE ORAL EVERY 8 HOURS
Status: DISCONTINUED | OUTPATIENT
Start: 2024-03-12 | End: 2024-03-08

## 2024-03-08 RX ORDER — LANOLIN ALCOHOL/MO/W.PET/CERES
100 CREAM (GRAM) TOPICAL DAILY
COMMUNITY
Start: 2023-10-18

## 2024-03-08 RX ORDER — ESCITALOPRAM OXALATE 10 MG/1
10 TABLET ORAL DAILY
Status: CANCELLED | OUTPATIENT
Start: 2024-03-08

## 2024-03-08 RX ORDER — POLYETHYLENE GLYCOL 3350 17 G/17G
17 POWDER, FOR SOLUTION ORAL DAILY
Status: CANCELLED | OUTPATIENT
Start: 2024-03-09

## 2024-03-08 RX ORDER — HYDROCHLOROTHIAZIDE 25 MG/1
1 TABLET ORAL DAILY
COMMUNITY
Start: 2024-01-16

## 2024-03-08 RX ORDER — ACETAMINOPHEN 325 MG/1
975 TABLET ORAL EVERY 8 HOURS
Status: CANCELLED | OUTPATIENT
Start: 2024-03-08 | End: 2024-03-11

## 2024-03-08 RX ORDER — GABAPENTIN 300 MG/1
900 CAPSULE ORAL EVERY 8 HOURS
Status: DISCONTINUED | OUTPATIENT
Start: 2024-03-09 | End: 2024-03-08

## 2024-03-08 RX ORDER — PROCHLORPERAZINE MALEATE 5 MG
5 TABLET ORAL EVERY 6 HOURS PRN
Status: CANCELLED | OUTPATIENT
Start: 2024-03-08

## 2024-03-08 RX ORDER — METOPROLOL TARTRATE 1 MG/ML
INJECTION, SOLUTION INTRAVENOUS PRN
Status: DISCONTINUED | OUTPATIENT
Start: 2024-03-08 | End: 2024-03-08

## 2024-03-08 RX ADMIN — SODIUM CHLORIDE, POTASSIUM CHLORIDE, SODIUM LACTATE AND CALCIUM CHLORIDE: 600; 310; 30; 20 INJECTION, SOLUTION INTRAVENOUS at 17:02

## 2024-03-08 RX ADMIN — AMLODIPINE BESYLATE 5 MG: 5 TABLET ORAL at 22:09

## 2024-03-08 RX ADMIN — HYDRALAZINE HYDROCHLORIDE 2 MG: 20 INJECTION INTRAMUSCULAR; INTRAVENOUS at 18:47

## 2024-03-08 RX ADMIN — SENNOSIDES 8.6 MG: 8.6 TABLET, FILM COATED ORAL at 20:25

## 2024-03-08 RX ADMIN — ESMOLOL HYDROCHLORIDE 30 MG: 10 INJECTION, SOLUTION INTRAVENOUS at 17:18

## 2024-03-08 RX ADMIN — ESMOLOL HYDROCHLORIDE 30 MG: 10 INJECTION, SOLUTION INTRAVENOUS at 17:12

## 2024-03-08 RX ADMIN — ESMOLOL HYDROCHLORIDE 20 MG: 10 INJECTION, SOLUTION INTRAVENOUS at 17:14

## 2024-03-08 RX ADMIN — SODIUM BICARBONATE 20 MG: 1 POWDER at 20:25

## 2024-03-08 RX ADMIN — METOPROLOL TARTRATE 5 MG: 1 INJECTION, SOLUTION INTRAVENOUS at 17:54

## 2024-03-08 RX ADMIN — ACETAMINOPHEN 650 MG: 325 TABLET, FILM COATED ORAL at 22:13

## 2024-03-08 RX ADMIN — THIAMINE HCL TAB 100 MG 100 MG: 100 TAB at 20:25

## 2024-03-08 RX ADMIN — METOPROLOL TARTRATE 3 MG: 5 INJECTION INTRAVENOUS at 17:22

## 2024-03-08 RX ADMIN — HYDRALAZINE HYDROCHLORIDE 2 MG: 20 INJECTION INTRAMUSCULAR; INTRAVENOUS at 18:16

## 2024-03-08 RX ADMIN — LEVOTHYROXINE SODIUM 175 MCG: 175 TABLET ORAL at 20:24

## 2024-03-08 RX ADMIN — ESCITALOPRAM OXALATE 10 MG: 10 TABLET ORAL at 20:23

## 2024-03-08 RX ADMIN — DEXAMETHASONE SODIUM PHOSPHATE 8 MG: 4 INJECTION, SOLUTION INTRA-ARTICULAR; INTRALESIONAL; INTRAMUSCULAR; INTRAVENOUS; SOFT TISSUE at 19:35

## 2024-03-08 RX ADMIN — FERROUS SULFATE TAB 325 MG (65 MG ELEMENTAL FE) 325 MG: 325 (65 FE) TAB at 20:23

## 2024-03-08 RX ADMIN — METOPROLOL TARTRATE 2 MG: 5 INJECTION INTRAVENOUS at 17:20

## 2024-03-08 RX ADMIN — FOLIC ACID 1 MG: 1 TABLET ORAL at 20:24

## 2024-03-08 RX ADMIN — METOPROLOL TARTRATE 150 MG: 50 TABLET, FILM COATED ORAL at 22:08

## 2024-03-08 RX ADMIN — AMPICILLIN SODIUM AND SULBACTAM SODIUM 3 G: 2; 1 INJECTION, POWDER, FOR SOLUTION INTRAMUSCULAR; INTRAVENOUS at 14:30

## 2024-03-08 RX ADMIN — ESMOLOL HYDROCHLORIDE 20 MG: 10 INJECTION, SOLUTION INTRAVENOUS at 17:16

## 2024-03-08 SDOH — HEALTH STABILITY: MENTAL HEALTH: CURRENT SMOKER: 0

## 2024-03-08 ASSESSMENT — ACTIVITIES OF DAILY LIVING (ADL)
ADLS_ACUITY_SCORE: 38
ADLS_ACUITY_SCORE: 37
ADLS_ACUITY_SCORE: 38
ADLS_ACUITY_SCORE: 38
ADLS_ACUITY_SCORE: 37
ADLS_ACUITY_SCORE: 38
ADLS_ACUITY_SCORE: 37
ADLS_ACUITY_SCORE: 38
ADLS_ACUITY_SCORE: 37

## 2024-03-08 ASSESSMENT — PAIN SCALES - GENERAL: PAINLEVEL: MODERATE PAIN (5)

## 2024-03-08 ASSESSMENT — ENCOUNTER SYMPTOMS: DYSRHYTHMIAS: 1

## 2024-03-08 NOTE — PATIENT INSTRUCTIONS
1.  You were seen in the ENT Clinic today by Dr. Del Rio. If you have any questions or concerns after your appointment, please call [phone]. Press option #1 for scheduling related needs. Press option #3 for Nurse advice.    2.  Dr. Del Rio has recommended the following:   -     3.  Plan is to return to clinic       Areli Campoverde RN  [phone]  [facility] - Otolaryngology

## 2024-03-08 NOTE — ANESTHESIA CARE TRANSFER NOTE
Patient: Shannon Beckwith    Procedure: Procedure(s):  MICROLARYNGOSCOPY WITH INCISION, DILATION, STEROID INJECTION FOR SUBGLOTTIC STENOSIS       Diagnosis: Subglottic stenosis [J38.6]  Dysphonia [R49.0]  History of gastric bypass [Z98.84]  Chronic atrial fibrillation (H) [I48.20]  Alcoholism (H) [F10.20]  Depression, unspecified depression type [F32.A]  Anxiety [F41.9]  Diagnosis Additional Information: No value filed.    Anesthesia Type:   General     Note:    Oropharynx: oropharynx clear of all foreign objects and spontaneously breathing  Level of Consciousness: awake  Oxygen Supplementation: face mask (non RB)  Level of Supplemental Oxygen (L/min / FiO2): 8  Independent Airway: airway patency satisfactory and stable  Dentition: dentition unchanged  Vital Signs Stable: post-procedure vital signs reviewed and stable  Report to RN Given: handoff report given  Patient transferred to: PACU    Handoff Report: Identifed the Patient, Identified the Reponsible Provider, Reviewed the pertinent medical history, Discussed the surgical course, Reviewed Intra-OP anesthesia mangement and issues during anesthesia, Set expectations for post-procedure period and Allowed opportunity for questions and acknowledgement of understanding  Vitals:  Vitals Value Taken Time   /121 03/08/24 1732   Temp     Pulse 92 03/08/24 1736   Resp     SpO2 100 % 03/08/24 1736   Vitals shown include unfiled device data.    Electronically Signed By: JOAO Quinonez CRNA  March 8, 2024  5:38 PM

## 2024-03-08 NOTE — PROGRESS NOTES
Dear Colleague:    I had the pleasure of meeting Shannon Beckwith in consultation at the [facility] of the [facility] on a self-referred basis, for evaluation of breathing problems and throat concerns. The note from our visit follows. Speech recognition software may have been used in the documentation below; input is reviewed before signature to the best of my ability. I appreciate the opportunity to participate in the care of this patient.    Please feel free to contact me with any questions.    Sincerely yours,    Aylin Del Rio M.D., M.P.H.  , Laryngology  Director, [facility]  Otolaryngology- Head & Neck Surgery  [phone]          =====  HISTORY OF PRESENT ILLNESS:   Shannon Beckwith is a pleasant 63 year old female with PMH including subglottic stenosis, alcohol use disorder, depression, atrial fibrillation with RVR on Eliquis, osteoporosis, arthritis, anemia, gastric bypass who presents with a long history of breathing problems and throat concerns.     She previously underwent Microdirect laryngoscopy with incision, dilation, steroid injection of subglottic stenosis on 11/21/2012 and then on 10/20/16. She was then lost to follow-up. Over the past six months she has either no-showed or last-minute cancelled numerous appointments due to struggles with alcoholism and mental health.      Voice  Voice raspy - minor concern    Swallowing  No concerns.   Things are not getting stuck or getting down the wrong way  No pna/bronchitis    Cough/Throat-clearing  Having some coughing after talking a lot, or with mucus or heavy breathing    Breathing  Airway has been gradually getting worse, starting about 4 months ago  Has been having a hard time with mental health, alcoholism, which has kept her from coming to clinic  States that she is aware that there are resources available if she asks    Currently can't walk very well bc  of hip injury  Feels her breathing is about 8 out of 10  Last ate/drank at 7 am    Throat discomfort  Has a feeling of thickness in the throat  Often has sore throats    Reflux-type symptoms  She experiences heartburn/indigestion rarely. However, is having partial emesis about once a day related to gastric bypass; has appt upcoming in April in [facility] for this. She is not taking reflux medications.    Other  Is on Eliquis, cardioversion is planned once she is able to continue it for three weeks      Prior EPIC/ outside records were reviewed for this visit, including:  Patt Rivas MD 2/21/24    MEDICATIONS:     Current Outpatient Medications   Medication Sig Dispense Refill    acetaminophen (TYLENOL) 325 MG tablet Take 2 tablets (650 mg) by mouth every 4 hours as needed for other (mild pain) 100 tablet 0    amLODIPine (NORVASC) 10 MG tablet Take 10 mg by mouth every morning       amoxicillin (AMOXIL) 500 MG tablet Take 2,000 mg by mouth daily as needed      atenolol (TENORMIN) 50 MG tablet Take 50 mg by mouth every evening       carboxymethylcellulose (CARBOXYMETHYLCELLULOSE SODIUM) 0.5 % SOLN ophthalmic solution Apply 1-2 drops to eye      diclofenac (VOLTAREN) 1 % topical gel Apply 4 g topically      ELIQUIS ANTICOAGULANT 5 MG tablet Take 1 Tablet (5 mg) by mouth two times daily. [Further refills to come from PCP]*      escitalopram (LEXAPRO) 10 MG tablet Take 5 mg daily for one week, then take 10 mg daily thereafter.      ferrous sulfate (FEROSUL) 325 (65 Fe) MG tablet Take 325 mg by mouth 2 times daily      FLUoxetine (PROZAC) 20 MG capsule Take 20 mg by mouth daily (with lunch)       folic acid (FOLVITE) 1 MG tablet Take 1 mg by mouth      gabapentin (NEURONTIN) 100 MG capsule Take 100 mg in the morning and afternoon as needed and 100-300 mg at bedtime as needed.      hydrochlorothiazide (HYDRODIURIL) 25 MG tablet Take 1 tablet by mouth daily      hydrOXYzine (ATARAX) 25 MG tablet Take 1 tablet (25 mg) by mouth  every 6 hours as needed for itching or anxiety (with pain, moderate pain) 30 tablet 0    ibuprofen (ADVIL/MOTRIN) 200 MG tablet Take 1 tablet (200 mg) by mouth 4 times daily as needed for mild pain DO NOT TAKE while on celebrex      ketorolac (TORADOL) 10 MG tablet Take 1 tablet (10 mg) by mouth every 6 hours as needed for moderate pain 12 tablet 0    levothyroxine (SYNTHROID/LEVOTHROID) 175 MCG tablet Take 175 mcg by mouth daily      metoprolol tartrate (LOPRESSOR) 100 MG tablet Take 150 mg by mouth      naltrexone (DEPADE/REVIA) 50 MG tablet Take 0.5 tablets by mouth at bedtime      PERMETHRIN EX Wheelchair: Standard with bilateral arm rests, seat cushion, and bilateral leg rests: Elevating (at least on the left) Length of need: 2 months      thiamine (B-1) 100 MG tablet Take 100 mg by mouth      Vitamin D, Cholecalciferol, 25 MCG (1000 UT) TABS Take 1 tablet by mouth every evening      aspirin 81 MG EC tablet Take 2 tablets (162 mg) by mouth daily 120 tablet 0    celecoxib (CELEBREX) 200 MG capsule Take 1 capsule (200 mg) by mouth daily Do not take within 6 hours of ibuprofen (MOTRIN, ADVIL) or ketorolac (TORADOL) if prescribed.  START taking after finishing Toradol prescription. 40 capsule 0    cyclobenzaprine (FLEXERIL) 10 MG tablet Take 1 tablet (10 mg) by mouth nightly as needed for muscle spasms  0    oxyCODONE (ROXICODONE) 5 MG tablet Take 1-2 tablets (5-10 mg) by mouth every 3 hours as needed for pain (Moderate to Severe) 30 tablet 0    senna-docusate (SENOKOT-S/PERICOLACE) 8.6-50 MG tablet Take 1-2 tablets by mouth 2 times daily Take while on oral narcotics to prevent or treat constipation. 100 tablet 0       ALLERGIES:    Allergies   Allergen Reactions    Celecoxib Hives and Rash     suspected drug rash    Nkda [No Known Drug Allergy]        PAST MEDICAL HISTORY:   Past Medical History:   Diagnosis Date    Anxiety     Arthritis     Chemical dependency (H) sober since July 2016 per PCP, multiple prior  inpatient hospital stays for EtOH abuse    COVID-19 04/10/2021    Depression     Dyspnea on exertion     Eating disorder     History of 2019 novel coronavirus disease (COVID-19) 04/2021    History of blood transfusion     Hypertension pre hypertensive    Obese     eating disorder    Problem, psychiatric     Alcohlic    Sleep apnea 2000    Not a problem since weight loss surgery     Thyroid disease     hypothyroidism    Tracheal stenosis    Bilateral femur fractures 2022  L ankle fracture 2023      PAST SURGICAL HISTORY:   Past Surgical History:   Procedure Laterality Date    ABDOMEN SURGERY      gastric bypass, elaine-en-y    APPENDECTOMY      ARTHRODESIS FOOT Right 3/30/2017    Procedure: ARTHRODESIS FOOT;  Surgeon: J Carlos Mcdonald MD;  Location: [facility] OR    ARTHROPLASTY HIP Left 7/27/2021    Procedure: LEFT TOTAL HIP ARTHROPLASTY;  Surgeon: Phil Carrera MD;  Location:  OR    BRONCHOSCOPY RIGID  2/14/2012    Procedure:BRONCHOSCOPY RIGID; Surgeon:TONNY PENA; Location:[facility] OR    GRAFT BONE FROM ILIAC CREST Right 3/30/2017    Procedure: GRAFT BONE FROM ILIAC CREST;  Surgeon: J Carlos Mcdonald MD;  Location:  OR    INJECT STEROID (LOCATION) N/A 10/20/2016    Procedure: INJECT STEROID (LOCATION);  Surgeon: Aylin Del Rio MD;  Location: [facility] OR    LARYNGOSCOPY, ESOPHAGOSCOPY WITH DILATION, COMBINED  2/14/2012    Procedure:COMBINED LARYNGOSCOPY, ESOPHAGOSCOPY WITH DILATION; Direct Laryngoscopy, Rigid Bronchoscopy, Balloon Dilation of Subglottic Stenosis  **latex safe; Surgeon:TONNY PENA; Location:[facility] OR    LASER CO2 LARYNGOSCOPY, COMPLEX  11/21/2012    Procedure: LASER CO2 LARYNGOSCOPY, COMPLEX;  Microdirect Laryngoscopy, Incision and Dilation,  CO2 Laser, Kenalog Injection;  Surgeon: Aylin Del Rio MD;  Location: [facility] OR    LASER CO2 LARYNGOSCOPY, COMPLEX N/A 10/20/2016    Procedure: LASER CO2 LARYNGOSCOPY, COMPLEX;  Surgeon: Aylin Del Rio MD;  Location: [facility] OR     ORTHOPEDIC SURGERY Left 2017    left total knee    subglottic dilatation      wisdom teeth removed[         HABITS/SOCIAL HISTORY:    Social History     Tobacco Use    Smoking status: Former     Years: 5     Types: Cigarettes     Start date: 1976     Quit date: 1980     Years since quittin.1    Smokeless tobacco: Not on file   Substance Use Topics    Alcohol use: No     Comment: AUG'18 & completed Urielon trt         FAMILY HISTORY:    Family History   Problem Relation Age of Onset    Alcohol/Drug Sister     Arthritis Mother     Thyroid Disease Mother     Depression Sister     Depression Sister     Obesity Sister     Obesity Sister     Obesity Sister     Depression Sister     Hypertension Sister     Thyroid Disease Sister     Diabetes Father     Hypertension Father        REVIEW OF SYSTEMS:  Comprehensive 11 point review of systems was reviewed. Positives are as noted below; pertinent findings are as noted in the HPI.     Patient Supplied Answers to Review of Systems As above       PHYSICAL EXAMINATION:  General: The patient was alert and conversant, and in no acute distress.    Eyes: PERRL, conjunctiva and lids normal, sclera nonicteric.  Nose: Anterior rhinoscopy: no gross abnormalities. no  bleeding; no  mucopurulence; septum grossly normal, mild mucoid drainage and/or crusting.  Oral cavity/oropharynx: No masses or lesions. Dentition in good condition. Tongue mobility and palate elevation intact and symmetric.  Ears: Normal auricles, external auditory canals bilaterally. Visualized portions of tympanic membranes normal bilaterally.   Neck: No palpable cervical lymphadenopathy. There was no significant tenderness to palpation of the thyrohyoid space, which was narrow. No obvious thyroid abnormality. Landmarks palpable.  Resp: Breathing comfortably, no stridor or stertor. Obvious noisy breathing on inhalation.  Neuro: Symmetric facial function. Other cranial nerves as documented  above.  Psych: Normal affect, pleasant and cooperative.  Voice/speech: No significant dysphonia.  Extremities: No cyanosis, clubbing, or edema of the upper extremities.        PROCEDURE:   Flexible Bronchoscopy (19961)  Indications: This procedure was warranted to evaluate the patient's airway anatomy. Risks, benefits, and alternatives were discussed.  Description: After informed consent was obtained, a time-out was performed to confirm patient identity, procedure, and procedure site. Topical 3% lidocaine with 0.25% phenylephrine was applied to the nasal cavities and oropharynx.I performed the endoscopy and no complications were apparent.  Performed by: Aylin Del Rio MD MPH  Findings: Glottis patent with good bilateral vocal fold mobility. Subglottis with severe stenosis. Trachea clear distally. Tiara sharp. Unremarkable takeoffs of mainstem bronchi.                                  IMPRESSION AND PLAN:   Shannon Beckwith presents with severe recurrent subglottic stenosis; she also has atrial fibrillation that she feels may be contributing to her shortness of breath. Over the past several months she has been also having daily emesis in the context of her history of gastric bypass, and this may be contributing to worsening of her breathing. Unfortunately alcoholism and mental health struggles have also posed a major barrier to care and she has missed multiple appointments with this clinic over the past several months.    We will need to do airway surgery today (microdirect laryngoscopy with incision, steroid injection, possible balloon dilation of subglottic stenosis, possible CO2 laser, if available) given the severity of her stenosis. Last PO was 7 am.    Risks of surgery, including injury to lips/ teeth/ tongue/ jaw/ throat/ airway/lungs, risks of general anesthesia, and temporary/permanent hoarseness were discussed. This included temporary or permanent dysgeusia, hypesthesia, or motion abnormalities of the  tongue. There is a slightly increased risk of bleeding given that she is currently on Eliquis. We also discussed a potential need for additional procedures in the future, particularly if the stenosis recurs, which is relatively common. We discussed that tracheotomy is rarely necessary. She was comfortable with steroid injection into the stenosis. The procedure will be outpatient with a low threshold for overnight observation if there are any concerns, since it is an airway procedure.     We discussed that ongoing follow-up and adjunct treatments will be needed. She was apologetic for this situation and indicated that she does want to be responsible about her health care. I also gently raised the possibility of a Social Work consult to explore whether she might benefit from more support to navigate her various health care needs and she was interested in this.    We will ask her to be seen in the ER today to assess for other factors potentially impacting her breathing as well as safety for urgent anesthesia.    I spent a total of 50 minutes on 3/8/2024 in chart review, review of tests, patient visit, documentation, care coordination, and/or discussion with other providers about the issues documented above, separate from any documented procedure(s).

## 2024-03-08 NOTE — BRIEF OP NOTE
[facility]    Brief Operative Note    Pre-operative diagnosis: Subglottic stenosis [J38.6]  Dysphonia [R49.0]  History of gastric bypass [Z98.84]  Chronic atrial fibrillation (H) [I48.20]  Alcoholism (H) [F10.20]  Depression, unspecified depression type [F32.A]  Anxiety [F41.9]  Post-operative diagnosis Same as pre-operative diagnosis    Procedure: MICROLARYNGOSCOPY WITH INCISION, DILATION, STEROID INJECTION FOR SUBGLOTTIC STENOSIS, N/A - Throat    Surgeon: Surgeon(s) and Role:     * Aylin Del Rio MD - Primary     * Christina Rivas MD - Resident - Assisting  Anesthesia: General   Estimated Blood Loss: None    Drains: None  Specimens: * No specimens in log *  Findings:   None.  Complications: See below          Case was aborted due to sustained heart rates up to the 180s. Given her history of atrial fibrillation and unreliable medication use, our team and anesthesia felt it was safest to abort procedure and proceed with cardiac clearance prior to surgery.       Plan:   - Admit to ENT  - Cardiology consult for afib work up and clearance   - Medicine consult for co-management  - Pharmacy consult for medication review  - HEENT: decadron 8mg q8h x3 doses  - Resp: continuous O2 monitoring, supplemental O2 as needed  - CV: continuous telemetry, defer afib management to cardiology and medicine teams  - GI: okay for regular diet   - Heme/ID: daily CBC, BMP, check coags now     Plan discussed with staff Dr. Quang Durán MD  Otolaryngology- Head and Neck Surgery, PGY2   Please contact ENT with questions by dialing [phone] and entering job code [phone] when prompted.

## 2024-03-08 NOTE — LETTER
3/8/2024      RE: Shannon Beckwith  [address]  [place] [facility] [address]       Dear Colleague:    I had the pleasure of meeting Shannon Beckwith in consultation at the [facility] of the [facility] on a self-referred basis, for evaluation of breathing problems and throat concerns. The note from our visit follows. Speech recognition software may have been used in the documentation below; input is reviewed before signature to the best of my ability. I appreciate the opportunity to participate in the care of this patient.    Please feel free to contact me with any questions.    Sincerely yours,    Aylin Del Rio M.D., M.P.H.  , Laryngology  Director, [facility]  Otolaryngology- Head & Neck Surgery  [phone]          =====  HISTORY OF PRESENT ILLNESS:   Shannon Beckwith is a pleasant 63 year old female with PMH including subglottic stenosis, alcohol use disorder, depression, atrial fibrillation with RVR on Eliquis, osteoporosis, arthritis, anemia, gastric bypass who presents with a long history of breathing problems and throat concerns.     She previously underwent Microdirect laryngoscopy with incision, dilation, steroid injection of subglottic stenosis on 11/21/2012 and then on 10/20/16. She was then lost to follow-up. Over the past six months she has either no-showed or last-minute cancelled numerous appointments due to struggles with alcoholism and mental health.      Voice  Voice raspy - minor concern    Swallowing  No concerns.   Things are not getting stuck or getting down the wrong way  No pna/bronchitis    Cough/Throat-clearing  Having some coughing after talking a lot, or with mucus or heavy breathing    Breathing  Airway has been gradually getting worse, starting about 4 months ago  Has been having a hard time with mental health, alcoholism, which has kept her from coming to clinic  States that she is aware  that there are resources available if she asks    Currently can't walk very well bc of hip injury  Feels her breathing is about 8 out of 10  Last ate/drank at 7 am    Throat discomfort  Has a feeling of thickness in the throat  Often has sore throats    Reflux-type symptoms  She experiences heartburn/indigestion rarely. However, is having partial emesis about once a day related to gastric bypass; has appt upcoming in April in [facility] for this. She is not taking reflux medications.    Other  Is on Eliquis, cardioversion is planned once she is able to continue it for three weeks      Prior EPIC/ outside records were reviewed for this visit, including:  Patt Rivas MD 2/21/24    MEDICATIONS:     Current Outpatient Medications   Medication Sig Dispense Refill     acetaminophen (TYLENOL) 325 MG tablet Take 2 tablets (650 mg) by mouth every 4 hours as needed for other (mild pain) 100 tablet 0     amLODIPine (NORVASC) 10 MG tablet Take 10 mg by mouth every morning        amoxicillin (AMOXIL) 500 MG tablet Take 2,000 mg by mouth daily as needed       atenolol (TENORMIN) 50 MG tablet Take 50 mg by mouth every evening        carboxymethylcellulose (CARBOXYMETHYLCELLULOSE SODIUM) 0.5 % SOLN ophthalmic solution Apply 1-2 drops to eye       diclofenac (VOLTAREN) 1 % topical gel Apply 4 g topically       ELIQUIS ANTICOAGULANT 5 MG tablet Take 1 Tablet (5 mg) by mouth two times daily. [Further refills to come from PCP]*       escitalopram (LEXAPRO) 10 MG tablet Take 5 mg daily for one week, then take 10 mg daily thereafter.       ferrous sulfate (FEROSUL) 325 (65 Fe) MG tablet Take 325 mg by mouth 2 times daily       FLUoxetine (PROZAC) 20 MG capsule Take 20 mg by mouth daily (with lunch)        folic acid (FOLVITE) 1 MG tablet Take 1 mg by mouth       gabapentin (NEURONTIN) 100 MG capsule Take 100 mg in the morning and afternoon as needed and 100-300 mg at bedtime as needed.       hydrochlorothiazide (HYDRODIURIL) 25 MG tablet  Take 1 tablet by mouth daily       hydrOXYzine (ATARAX) 25 MG tablet Take 1 tablet (25 mg) by mouth every 6 hours as needed for itching or anxiety (with pain, moderate pain) 30 tablet 0     ibuprofen (ADVIL/MOTRIN) 200 MG tablet Take 1 tablet (200 mg) by mouth 4 times daily as needed for mild pain DO NOT TAKE while on celebrex       ketorolac (TORADOL) 10 MG tablet Take 1 tablet (10 mg) by mouth every 6 hours as needed for moderate pain 12 tablet 0     levothyroxine (SYNTHROID/LEVOTHROID) 175 MCG tablet Take 175 mcg by mouth daily       metoprolol tartrate (LOPRESSOR) 100 MG tablet Take 150 mg by mouth       naltrexone (DEPADE/REVIA) 50 MG tablet Take 0.5 tablets by mouth at bedtime       PERMETHRIN EX Wheelchair: Standard with bilateral arm rests, seat cushion, and bilateral leg rests: Elevating (at least on the left) Length of need: 2 months       thiamine (B-1) 100 MG tablet Take 100 mg by mouth       Vitamin D, Cholecalciferol, 25 MCG (1000 UT) TABS Take 1 tablet by mouth every evening       aspirin 81 MG EC tablet Take 2 tablets (162 mg) by mouth daily 120 tablet 0     celecoxib (CELEBREX) 200 MG capsule Take 1 capsule (200 mg) by mouth daily Do not take within 6 hours of ibuprofen (MOTRIN, ADVIL) or ketorolac (TORADOL) if prescribed.  START taking after finishing Toradol prescription. 40 capsule 0     cyclobenzaprine (FLEXERIL) 10 MG tablet Take 1 tablet (10 mg) by mouth nightly as needed for muscle spasms  0     oxyCODONE (ROXICODONE) 5 MG tablet Take 1-2 tablets (5-10 mg) by mouth every 3 hours as needed for pain (Moderate to Severe) 30 tablet 0     senna-docusate (SENOKOT-S/PERICOLACE) 8.6-50 MG tablet Take 1-2 tablets by mouth 2 times daily Take while on oral narcotics to prevent or treat constipation. 100 tablet 0       ALLERGIES:    Allergies   Allergen Reactions     Celecoxib Hives and Rash     suspected drug rash     Nkda [No Known Drug Allergy]        PAST MEDICAL HISTORY:   Past Medical History:    Diagnosis Date     Anxiety      Arthritis      Chemical dependency (H) sober since July 2016 per PCP, multiple prior inpatient hospital stays for EtOH abuse     COVID-19 04/10/2021     Depression      Dyspnea on exertion      Eating disorder      History of 2019 novel coronavirus disease (COVID-19) 04/2021     History of blood transfusion      Hypertension pre hypertensive     Obese     eating disorder     Problem, psychiatric     Alcohlic     Sleep apnea 2000    Not a problem since weight loss surgery      Thyroid disease     hypothyroidism     Tracheal stenosis    Bilateral femur fractures 2022  L ankle fracture 2023      PAST SURGICAL HISTORY:   Past Surgical History:   Procedure Laterality Date     ABDOMEN SURGERY      gastric bypass, elaine-en-y     APPENDECTOMY       ARTHRODESIS FOOT Right 3/30/2017    Procedure: ARTHRODESIS FOOT;  Surgeon: J Carlos Mcdonald MD;  Location: [facility] OR     ARTHROPLASTY HIP Left 7/27/2021    Procedure: LEFT TOTAL HIP ARTHROPLASTY;  Surgeon: Phil Carrera MD;  Location: [facility] OR     BRONCHOSCOPY RIGID  2/14/2012    Procedure:BRONCHOSCOPY RIGID; Surgeon:TONNY PENA; Location:[facility] OR     GRAFT BONE FROM ILIAC CREST Right 3/30/2017    Procedure: GRAFT BONE FROM ILIAC CREST;  Surgeon: J Carlos Mcdonald MD;  Location: [facility] OR     INJECT STEROID (LOCATION) N/A 10/20/2016    Procedure: INJECT STEROID (LOCATION);  Surgeon: Aylin Del Rio MD;  Location: [facility] OR     LARYNGOSCOPY, ESOPHAGOSCOPY WITH DILATION, COMBINED  2/14/2012    Procedure:COMBINED LARYNGOSCOPY, ESOPHAGOSCOPY WITH DILATION; Direct Laryngoscopy, Rigid Bronchoscopy, Balloon Dilation of Subglottic Stenosis  **latex safe; Surgeon:TONNY PENA; Location:[facility] OR     LASER CO2 LARYNGOSCOPY, COMPLEX  11/21/2012    Procedure: LASER CO2 LARYNGOSCOPY, COMPLEX;  Microdirect Laryngoscopy, Incision and Dilation,  CO2 Laser, Kenalog Injection;  Surgeon: Aylin Del Rio MD;  Location: [facility] OR     LASER CO2  LARYNGOSCOPY, COMPLEX N/A 10/20/2016    Procedure: LASER CO2 LARYNGOSCOPY, COMPLEX;  Surgeon: Aylin Del Rio MD;  Location: [facility] OR     ORTHOPEDIC SURGERY Left 2017    left total knee     subglottic dilatation       wisdom teeth removed[         HABITS/SOCIAL HISTORY:    Social History     Tobacco Use     Smoking status: Former     Years: 5     Types: Cigarettes     Start date: 1976     Quit date: 1980     Years since quittin.1     Smokeless tobacco: Not on file   Substance Use Topics     Alcohol use: No     Comment: AUG'18 & completed Josh trjacqueline         FAMILY HISTORY:    Family History   Problem Relation Age of Onset     Alcohol/Drug Sister      Arthritis Mother      Thyroid Disease Mother      Depression Sister      Depression Sister      Obesity Sister      Obesity Sister      Obesity Sister      Depression Sister      Hypertension Sister      Thyroid Disease Sister      Diabetes Father      Hypertension Father        REVIEW OF SYSTEMS:  Comprehensive 11 point review of systems was reviewed. Positives are as noted below; pertinent findings are as noted in the HPI.     Patient Supplied Answers to Review of Systems As above       PHYSICAL EXAMINATION:  General: The patient was alert and conversant, and in no acute distress.    Eyes: PERRL, conjunctiva and lids normal, sclera nonicteric.  Nose: Anterior rhinoscopy: no gross abnormalities. no  bleeding; no  mucopurulence; septum grossly normal, mild mucoid drainage and/or crusting.  Oral cavity/oropharynx: No masses or lesions. Dentition in good condition. Tongue mobility and palate elevation intact and symmetric.  Ears: Normal auricles, external auditory canals bilaterally. Visualized portions of tympanic membranes normal bilaterally.   Neck: No palpable cervical lymphadenopathy. There was no significant tenderness to palpation of the thyrohyoid space, which was narrow. No obvious thyroid abnormality. Landmarks  palpable.  Resp: Breathing comfortably, no stridor or stertor. Obvious noisy breathing on inhalation.  Neuro: Symmetric facial function. Other cranial nerves as documented above.  Psych: Normal affect, pleasant and cooperative.  Voice/speech: No significant dysphonia.  Extremities: No cyanosis, clubbing, or edema of the upper extremities.        PROCEDURE:   Flexible Bronchoscopy (01303)  Indications: This procedure was warranted to evaluate the patient's airway anatomy. Risks, benefits, and alternatives were discussed.  Description: After informed consent was obtained, a time-out was performed to confirm patient identity, procedure, and procedure site. Topical 3% lidocaine with 0.25% phenylephrine was applied to the nasal cavities and oropharynx.I performed the endoscopy and no complications were apparent.  Performed by: Aylin Del Rio MD MPH  Findings: Glottis patent with good bilateral vocal fold mobility. Subglottis with severe stenosis. Trachea clear distally. Tiara sharp. Unremarkable takeoffs of mainstem bronchi.                                  IMPRESSION AND PLAN:   Shannon Beckwith presents with severe recurrent subglottic stenosis; she also has atrial fibrillation that she feels may be contributing to her shortness of breath. Over the past several months she has been also having daily emesis in the context of her history of gastric bypass, and this may be contributing to worsening of her breathing. Unfortunately alcoholism and mental health struggles have also posed a major barrier to care and she has missed multiple appointments with this clinic over the past several months.    We will need to do airway surgery today (microdirect laryngoscopy with incision, steroid injection, possible balloon dilation of subglottic stenosis, possible CO2 laser, if available) given the severity of her stenosis. Last PO was 7 am.    Risks of surgery, including injury to lips/ teeth/ tongue/ jaw/ throat/ airway/lungs,  risks of general anesthesia, and temporary/permanent hoarseness were discussed. This included temporary or permanent dysgeusia, hypesthesia, or motion abnormalities of the tongue. There is a slightly increased risk of bleeding given that she is currently on Eliquis. We also discussed a potential need for additional procedures in the future, particularly if the stenosis recurs, which is relatively common. We discussed that tracheotomy is rarely necessary. She was comfortable with steroid injection into the stenosis. The procedure will be outpatient with a low threshold for overnight observation if there are any concerns, since it is an airway procedure.     We discussed that ongoing follow-up and adjunct treatments will be needed. She was apologetic for this situation and indicated that she does want to be responsible about her health care. I also gently raised the possibility of a Social Work consult to explore whether she might benefit from more support to navigate her various health care needs and she was interested in this.    We will ask her to be seen in the ER today to assess for other factors potentially impacting her breathing as well as safety for urgent anesthesia.    I spent a total of 50 minutes on 3/8/2024 in chart review, review of tests, patient visit, documentation, care coordination, and/or discussion with other providers about the issues documented above, separate from any documented procedure(s).      Aylin Del Rio MD

## 2024-03-08 NOTE — OR NURSING
Ed & Ana both notified regarding delayed procedure time and anticipated  discharge time.

## 2024-03-08 NOTE — OR NURSING
Dr. Noble, anesthesia PACU, bedside to US of heart. Nothing of concern at this time. Pt stable, HR consistent in 90s, BP elevated.

## 2024-03-08 NOTE — PROGRESS NOTES
Called patient as she had cancelled her appointment again. Writer explained to patient that it is important for her to come to her appointments as if her SGS got worse it will result in an emergency and given her provider is going to be out of the office it could result with her needing to go to an alternative clinic. Patient verbalized understanding of this, and explained that she had been in the ER due to a leg injury/ACL injury. Patient also noted she though that she couldn't come as she had a walker and had been on pain medication. Writer explained to patient that she could still come to clinic, patient then said she couldn't come because she has not followed the NPO guidelines. Writer explained that the clinic would still see her, and if she didn't come today it would be April or so before the provider would be able to see her. Patient verbalized understanding of the situation and stated that she would have her neighbor drive her to her appointment. Patient stated she understood the risks of not coming to her appointment today. Areli Campoverde RN on 3/8/2024 at 8:13 AM

## 2024-03-08 NOTE — ANESTHESIA PREPROCEDURE EVALUATION
Anesthesia Evaluation     . Pt has had prior anesthetic. Type: General and MAC.           ROS/MED HX    ENT/Pulmonary: Comment: Tracheal stenosis with surgery times two previously.    (+) sleep apnea,                                       Neurologic:       Cardiovascular:     (+)  hypertension- -   -  - -           CONTE.             dysrhythmias, a-fib,               METS/Exercise Tolerance:     Hematologic:         Musculoskeletal:         GI/Hepatic:         Renal/Genitourinary:         Endo:     (+)          thyroid problem,     Obesity,         Psychiatric:         Infectious Disease:         Malignancy:       Other:                       Physical Exam  Normal systems: pulmonary and dental    Airway   Mallampati: I  TM distance: >3 FB  Neck ROM: full    Dental     Cardiovascular   Rhythm and rate: irregular      Pulmonary                        Lab / Radiology Results:   Reviewed current labs when avail, see EMR for details.      BMP:  No results for input(s): NA, POTASSIUM, CHLORIDE, CO2, BUN, CR, GLC, MARCELO in the last 69735 hours.    Invalid input(s): MG    LFTs:   No results for input(s): PROTTOTAL, ALBUMIN, BILITOTAL, ALKPHOS, AST, ALT, BILIDIRECT in the last 77612 hours.    CBC:   No results for input(s): WBC, HGB, PLT in the last 96782 hours.    Coags:  No results for input(s): INR, PTT, FIBR in the last 58915 hours.    Blood Bank:  No results found for this basename: abo, RH, AS    Studies:  See EMR for current studies, reviewed when available.      Anesthesia Plan     ASA Status:  3       Patient was instructed to abstain from smoking on day of procedure.The patient is not a current smoker  and patient did not smoke on day of surgery  AQI Quality Review: not current smoker   instructed to abstain from smoking on day of procedure   patient did not smoke on day of surgery          NPO Status: > 6 hours  Anesthesia Plan Type Discussed: General        Airway: ETT   Induction Technique/Agent:  IntravenousMaintenance: Inhalation.  PONV Management: Ondansetron (or other 5HT-3) and Scopolamine patch  Other Comments: The material risks, benefits, alternatives were discussed in detail.  The patient agrees to proceed.  The patient has no other complaints at this time.    Postoperative Care  Pain management: IV analgesics.    Consents  Anesthetic plan, risks, benefits and alternatives discussed with:  Patient.  Use of blood products discussed: No . Use of blood products discussed: No .    .      Danilo Anderson MD  Anesthesiologist  7:15 AM  October 20, 2016                        .  Anesthesia Evaluation   Pt has had prior anesthetic. Type: General and MAC.        ROS/MED HX  ENT/Pulmonary: Comment: Tracheal stenosis with surgery times two previously.    (+) sleep apnea,                                       Neurologic:       Cardiovascular:     (+)  hypertension- -   -  - -           CONTE.             dysrhythmias, a-fib,             METS/Exercise Tolerance:     Hematologic:       Musculoskeletal:       GI/Hepatic:       Renal/Genitourinary:       Endo:     (+)          thyroid problem,     Obesity,       Psychiatric/Substance Use:       Infectious Disease:       Malignancy:  - neg malignancy ROS     Other:            Physical Exam    Airway        Mallampati: I   TM distance: >3 FB   Neck ROM: full     Respiratory Devices and Support         Dental  no notable dental history         Cardiovascular          Rhythm and rate: irregular     Pulmonary   pulmonary exam normal                Anesthesia Plan    ASA Status:  3       Anesthesia Type: General.     - Airway: ETT   Induction: Intravenous.   Maintenance: Inhalation.        Consents    Anesthesia Plan(s) and associated risks, benefits, and realistic alternatives discussed. Questions answered and patient/representative(s) expressed understanding.     - Discussed:     - Discussed with:  Patient       Use of blood products discussed: No .     Postoperative  Care    Pain management: IV analgesics.   PONV prophylaxis: Ondansetron (or other 5HT-3), Scopolamine patch     Comments:    Other Comments: The material risks, benefits, alternatives were discussed in detail.  The patient agrees to proceed.  The patient has no other complaints at this time.       Patient was instructed to abstain from smoking on day of procedure.The patient is not a current smoker  and patient did not smoke on day of surgery  AQI Quality Review: not current smoker   instructed to abstain from smoking on day of procedure   patient did not smoke on day of surgery

## 2024-03-09 ENCOUNTER — ANESTHESIA EVENT (OUTPATIENT)
Dept: SURGERY | Facility: CLINIC | Age: 64
DRG: 144 | End: 2024-03-09
Payer: COMMERCIAL

## 2024-03-09 LAB
ANION GAP SERPL CALCULATED.3IONS-SCNC: 9 MMOL/L (ref 7–15)
BUN SERPL-MCNC: 11.5 MG/DL (ref 8–23)
CALCIUM SERPL-MCNC: 9.1 MG/DL (ref 8.8–10.2)
CHLORIDE SERPL-SCNC: 102 MMOL/L (ref 98–107)
CREAT SERPL-MCNC: 0.58 MG/DL (ref 0.51–0.95)
DEPRECATED HCO3 PLAS-SCNC: 24 MMOL/L (ref 22–29)
EGFRCR SERPLBLD CKD-EPI 2021: >90 ML/MIN/1.73M2
ERYTHROCYTE [DISTWIDTH] IN BLOOD BY AUTOMATED COUNT: 15.9 % (ref 10–15)
GLUCOSE SERPL-MCNC: 175 MG/DL (ref 70–99)
HCT VFR BLD AUTO: 39.4 % (ref 35–47)
HGB BLD-MCNC: 12.8 G/DL (ref 11.7–15.7)
HOLD SPECIMEN: NORMAL
MAGNESIUM SERPL-MCNC: 1.9 MG/DL (ref 1.7–2.3)
MCH RBC QN AUTO: 29.6 PG (ref 26.5–33)
MCHC RBC AUTO-ENTMCNC: 32.5 G/DL (ref 31.5–36.5)
MCV RBC AUTO: 91 FL (ref 78–100)
PHOSPHATE SERPL-MCNC: 4.6 MG/DL (ref 2.5–4.5)
PLATELET # BLD AUTO: 266 10E3/UL (ref 150–450)
POTASSIUM SERPL-SCNC: 4.2 MMOL/L (ref 3.4–5.3)
RBC # BLD AUTO: 4.32 10E6/UL (ref 3.8–5.2)
SODIUM SERPL-SCNC: 135 MMOL/L (ref 135–145)
T4 FREE SERPL-MCNC: 0.73 NG/DL (ref 0.9–1.7)
TSH SERPL DL<=0.005 MIU/L-ACNC: 21.2 UIU/ML (ref 0.3–4.2)
WBC # BLD AUTO: 4.4 10E3/UL (ref 4–11)

## 2024-03-09 PROCEDURE — 36415 COLL VENOUS BLD VENIPUNCTURE: CPT | Performed by: STUDENT IN AN ORGANIZED HEALTH CARE EDUCATION/TRAINING PROGRAM

## 2024-03-09 PROCEDURE — 93005 ELECTROCARDIOGRAM TRACING: CPT

## 2024-03-09 PROCEDURE — 84100 ASSAY OF PHOSPHORUS: CPT | Performed by: STUDENT IN AN ORGANIZED HEALTH CARE EDUCATION/TRAINING PROGRAM

## 2024-03-09 PROCEDURE — 93010 ELECTROCARDIOGRAM REPORT: CPT | Performed by: INTERNAL MEDICINE

## 2024-03-09 PROCEDURE — 83735 ASSAY OF MAGNESIUM: CPT | Performed by: STUDENT IN AN ORGANIZED HEALTH CARE EDUCATION/TRAINING PROGRAM

## 2024-03-09 PROCEDURE — 250N000013 HC RX MED GY IP 250 OP 250 PS 637

## 2024-03-09 PROCEDURE — 99207 PR APP CREDIT; MD BILLING SHARED VISIT: CPT | Performed by: INTERNAL MEDICINE

## 2024-03-09 PROCEDURE — 250N000013 HC RX MED GY IP 250 OP 250 PS 637: Performed by: STUDENT IN AN ORGANIZED HEALTH CARE EDUCATION/TRAINING PROGRAM

## 2024-03-09 PROCEDURE — 80048 BASIC METABOLIC PNL TOTAL CA: CPT

## 2024-03-09 PROCEDURE — 250N000009 HC RX 250

## 2024-03-09 PROCEDURE — 84443 ASSAY THYROID STIM HORMONE: CPT | Performed by: STUDENT IN AN ORGANIZED HEALTH CARE EDUCATION/TRAINING PROGRAM

## 2024-03-09 PROCEDURE — 85027 COMPLETE CBC AUTOMATED: CPT

## 2024-03-09 PROCEDURE — 120N000002 HC R&B MED SURG/OB UMMC

## 2024-03-09 PROCEDURE — 36415 COLL VENOUS BLD VENIPUNCTURE: CPT

## 2024-03-09 PROCEDURE — 99255 IP/OBS CONSLTJ NEW/EST HI 80: CPT | Performed by: STUDENT IN AN ORGANIZED HEALTH CARE EDUCATION/TRAINING PROGRAM

## 2024-03-09 PROCEDURE — 250N000011 HC RX IP 250 OP 636

## 2024-03-09 PROCEDURE — 99418 PROLNG IP/OBS E/M EA 15 MIN: CPT | Performed by: STUDENT IN AN ORGANIZED HEALTH CARE EDUCATION/TRAINING PROGRAM

## 2024-03-09 PROCEDURE — 84439 ASSAY OF FREE THYROXINE: CPT | Performed by: STUDENT IN AN ORGANIZED HEALTH CARE EDUCATION/TRAINING PROGRAM

## 2024-03-09 PROCEDURE — 250N000011 HC RX IP 250 OP 636: Performed by: OTOLARYNGOLOGY

## 2024-03-09 PROCEDURE — 99223 1ST HOSP IP/OBS HIGH 75: CPT | Mod: GC | Performed by: STUDENT IN AN ORGANIZED HEALTH CARE EDUCATION/TRAINING PROGRAM

## 2024-03-09 PROCEDURE — 250N000013 HC RX MED GY IP 250 OP 250 PS 637: Performed by: INTERNAL MEDICINE

## 2024-03-09 RX ORDER — ACAMPROSATE CALCIUM 333 MG/1
666 TABLET, DELAYED RELEASE ORAL 3 TIMES DAILY
Qty: 180 TABLET | Refills: 0 | Status: SHIPPED | OUTPATIENT
Start: 2024-03-09

## 2024-03-09 RX ORDER — HALOPERIDOL 5 MG/ML
2.5-5 INJECTION INTRAMUSCULAR EVERY 6 HOURS PRN
Status: DISCONTINUED | OUTPATIENT
Start: 2024-03-09 | End: 2024-03-09

## 2024-03-09 RX ORDER — CLONIDINE HYDROCHLORIDE 0.1 MG/1
0.1 TABLET ORAL EVERY 8 HOURS
Status: DISCONTINUED | OUTPATIENT
Start: 2024-03-09 | End: 2024-03-10 | Stop reason: HOSPADM

## 2024-03-09 RX ORDER — IBUPROFEN 200 MG
CAPSULE ORAL DAILY
COMMUNITY

## 2024-03-09 RX ORDER — DIAZEPAM 5 MG
10 TABLET ORAL EVERY 30 MIN PRN
Status: DISCONTINUED | OUTPATIENT
Start: 2024-03-09 | End: 2024-03-09

## 2024-03-09 RX ORDER — GABAPENTIN 300 MG/1
900 CAPSULE ORAL EVERY 8 HOURS
Status: DISCONTINUED | OUTPATIENT
Start: 2024-03-09 | End: 2024-03-09

## 2024-03-09 RX ORDER — FOLIC ACID 1 MG/1
1 TABLET ORAL DAILY
Status: DISCONTINUED | OUTPATIENT
Start: 2024-03-09 | End: 2024-03-09

## 2024-03-09 RX ORDER — HYDRALAZINE HYDROCHLORIDE 20 MG/ML
5 INJECTION INTRAMUSCULAR; INTRAVENOUS EVERY 6 HOURS PRN
Status: DISCONTINUED | OUTPATIENT
Start: 2024-03-09 | End: 2024-03-10 | Stop reason: HOSPADM

## 2024-03-09 RX ORDER — VITAMIN B COMPLEX
50 TABLET ORAL DAILY
Status: DISCONTINUED | OUTPATIENT
Start: 2024-03-10 | End: 2024-03-10 | Stop reason: HOSPADM

## 2024-03-09 RX ORDER — CAFFEINE 200 MG
200 TABLET ORAL 2 TIMES DAILY
Status: DISCONTINUED | OUTPATIENT
Start: 2024-03-09 | End: 2024-03-10 | Stop reason: HOSPADM

## 2024-03-09 RX ORDER — FLUMAZENIL 0.1 MG/ML
0.2 INJECTION, SOLUTION INTRAVENOUS
Status: DISCONTINUED | OUTPATIENT
Start: 2024-03-09 | End: 2024-03-10 | Stop reason: HOSPADM

## 2024-03-09 RX ORDER — ACAMPROSATE CALCIUM 333 MG/1
666 TABLET, DELAYED RELEASE ORAL 3 TIMES DAILY
Status: DISCONTINUED | OUTPATIENT
Start: 2024-03-09 | End: 2024-03-10 | Stop reason: HOSPADM

## 2024-03-09 RX ORDER — GABAPENTIN 100 MG/1
100 CAPSULE ORAL 3 TIMES DAILY
Status: DISCONTINUED | OUTPATIENT
Start: 2024-03-09 | End: 2024-03-10 | Stop reason: HOSPADM

## 2024-03-09 RX ORDER — GABAPENTIN 600 MG/1
1200 TABLET ORAL ONCE
Status: COMPLETED | OUTPATIENT
Start: 2024-03-09 | End: 2024-03-09

## 2024-03-09 RX ORDER — MULTIPLE VITAMINS W/ MINERALS TAB 9MG-400MCG
1 TAB ORAL DAILY
Status: DISCONTINUED | OUTPATIENT
Start: 2024-03-09 | End: 2024-03-10 | Stop reason: HOSPADM

## 2024-03-09 RX ORDER — GABAPENTIN 300 MG/1
600 CAPSULE ORAL EVERY 8 HOURS
Status: DISCONTINUED | OUTPATIENT
Start: 2024-03-12 | End: 2024-03-09

## 2024-03-09 RX ORDER — GABAPENTIN 300 MG/1
300 CAPSULE ORAL EVERY 8 HOURS
Status: DISCONTINUED | OUTPATIENT
Start: 2024-03-14 | End: 2024-03-09

## 2024-03-09 RX ORDER — DEXAMETHASONE SODIUM PHOSPHATE 10 MG/ML
8 INJECTION, SOLUTION INTRAMUSCULAR; INTRAVENOUS EVERY 8 HOURS
Status: COMPLETED | OUTPATIENT
Start: 2024-03-09 | End: 2024-03-09

## 2024-03-09 RX ORDER — OLANZAPINE 5 MG/1
5-10 TABLET, ORALLY DISINTEGRATING ORAL EVERY 6 HOURS PRN
Status: DISCONTINUED | OUTPATIENT
Start: 2024-03-09 | End: 2024-03-09

## 2024-03-09 RX ORDER — DIAZEPAM 10 MG/2ML
5-10 INJECTION, SOLUTION INTRAMUSCULAR; INTRAVENOUS EVERY 30 MIN PRN
Status: DISCONTINUED | OUTPATIENT
Start: 2024-03-09 | End: 2024-03-09

## 2024-03-09 RX ORDER — CALCIUM CITRATE/VITAMIN D3 315MG-6.25
2 TABLET ORAL 2 TIMES DAILY WITH MEALS
Status: DISCONTINUED | OUTPATIENT
Start: 2024-03-09 | End: 2024-03-10 | Stop reason: HOSPADM

## 2024-03-09 RX ORDER — MULTIVITAMIN,THERAPEUTIC
1 TABLET ORAL DAILY
COMMUNITY

## 2024-03-09 RX ORDER — GABAPENTIN 100 MG/1
100 CAPSULE ORAL EVERY 8 HOURS
Status: DISCONTINUED | OUTPATIENT
Start: 2024-03-16 | End: 2024-03-09

## 2024-03-09 RX ADMIN — ACAMPROSATE CALCIUM 666 MG: 333 TABLET, DELAYED RELEASE ORAL at 15:23

## 2024-03-09 RX ADMIN — FERROUS SULFATE TAB 325 MG (65 MG ELEMENTAL FE) 325 MG: 325 (65 FE) TAB at 19:50

## 2024-03-09 RX ADMIN — FOLIC ACID 1 MG: 1 TABLET ORAL at 08:41

## 2024-03-09 RX ADMIN — ACETAMINOPHEN 650 MG: 325 TABLET, FILM COATED ORAL at 05:34

## 2024-03-09 RX ADMIN — ACAMPROSATE CALCIUM 666 MG: 333 TABLET, DELAYED RELEASE ORAL at 19:50

## 2024-03-09 RX ADMIN — FERROUS SULFATE TAB 325 MG (65 MG ELEMENTAL FE) 325 MG: 325 (65 FE) TAB at 08:41

## 2024-03-09 RX ADMIN — LEVOTHYROXINE SODIUM 175 MCG: 175 TABLET ORAL at 08:41

## 2024-03-09 RX ADMIN — GABAPENTIN 100 MG: 100 CAPSULE ORAL at 15:23

## 2024-03-09 RX ADMIN — DEXAMETHASONE SODIUM PHOSPHATE 8 MG: 4 INJECTION, SOLUTION INTRA-ARTICULAR; INTRALESIONAL; INTRAMUSCULAR; INTRAVENOUS; SOFT TISSUE at 01:30

## 2024-03-09 RX ADMIN — DEXAMETHASONE SODIUM PHOSPHATE 8 MG: 10 INJECTION, SOLUTION INTRAMUSCULAR; INTRAVENOUS at 11:30

## 2024-03-09 RX ADMIN — CLONIDINE HYDROCHLORIDE 0.1 MG: 0.1 TABLET ORAL at 17:57

## 2024-03-09 RX ADMIN — AMLODIPINE BESYLATE 5 MG: 5 TABLET ORAL at 19:50

## 2024-03-09 RX ADMIN — Medication 1 TABLET: at 08:41

## 2024-03-09 RX ADMIN — GABAPENTIN 100 MG: 100 CAPSULE ORAL at 19:50

## 2024-03-09 RX ADMIN — SODIUM BICARBONATE 20 MG: 1 POWDER at 08:41

## 2024-03-09 RX ADMIN — SENNOSIDES 8.6 MG: 8.6 TABLET, FILM COATED ORAL at 08:41

## 2024-03-09 RX ADMIN — METOPROLOL TARTRATE 150 MG: 50 TABLET, FILM COATED ORAL at 08:40

## 2024-03-09 RX ADMIN — SENNOSIDES 8.6 MG: 8.6 TABLET, FILM COATED ORAL at 19:49

## 2024-03-09 RX ADMIN — METOPROLOL TARTRATE 150 MG: 50 TABLET, FILM COATED ORAL at 19:50

## 2024-03-09 RX ADMIN — Medication 2 TABLET: at 17:57

## 2024-03-09 RX ADMIN — OXYCODONE HYDROCHLORIDE 5 MG: 5 TABLET ORAL at 08:46

## 2024-03-09 RX ADMIN — SODIUM BICARBONATE 20 MG: 1 POWDER at 23:23

## 2024-03-09 RX ADMIN — GABAPENTIN 1200 MG: 600 TABLET, FILM COATED ORAL at 10:03

## 2024-03-09 RX ADMIN — ESCITALOPRAM OXALATE 10 MG: 10 TABLET ORAL at 08:41

## 2024-03-09 RX ADMIN — CLONIDINE HYDROCHLORIDE 0.1 MG: 0.1 TABLET ORAL at 10:03

## 2024-03-09 RX ADMIN — THIAMINE HCL TAB 100 MG 100 MG: 100 TAB at 08:41

## 2024-03-09 ASSESSMENT — ACTIVITIES OF DAILY LIVING (ADL)
ADLS_ACUITY_SCORE: 24
ADLS_ACUITY_SCORE: 30
ADLS_ACUITY_SCORE: 28
ADLS_ACUITY_SCORE: 30
ADLS_ACUITY_SCORE: 27
ADLS_ACUITY_SCORE: 28
ADLS_ACUITY_SCORE: 27
ADLS_ACUITY_SCORE: 30
ADLS_ACUITY_SCORE: 30
ADLS_ACUITY_SCORE: 27
ADLS_ACUITY_SCORE: 28
ADLS_ACUITY_SCORE: 28
ADLS_ACUITY_SCORE: 30
ADLS_ACUITY_SCORE: 24
ADLS_ACUITY_SCORE: 37
ADLS_ACUITY_SCORE: 28
ADLS_ACUITY_SCORE: 27
ADLS_ACUITY_SCORE: 28

## 2024-03-09 ASSESSMENT — ENCOUNTER SYMPTOMS: DYSRHYTHMIAS: 1

## 2024-03-09 ASSESSMENT — VISUAL ACUITY: OU: GLASSES

## 2024-03-09 NOTE — PROGRESS NOTES
"Otolaryngology Progress Note  March 9, 2024    S: No acute events overnight. HA that has not resolved with tylenol. No altered mental status, CP, or respiratory.     O: BP (!) 149/106 (BP Location: Left arm)   Pulse 97   Temp 98  F (36.7  C) (Oral)   Resp 20   Ht 1.753 m (5' 9\")   Wt 95.6 kg (210 lb 12.8 oz)   SpO2 98%   BMI 31.13 kg/m     General: Alert and oriented x 3, No acute distress   HEENT: EOMI. HB 1/6.    Pulmonary: Breathing non-labored, biphasic stridor, no accessory muscle use. RA        LABS:  ROUTINE IP LABS (Last four results)  BMP  Recent Labs   Lab 03/09/24  0550 03/08/24  1909    137   POTASSIUM 4.2 4.1   CHLORIDE 102 103   MARCELO 9.1 8.9   CO2 24 23   BUN 11.5 10.1   CR 0.58 0.58   * 105*     CBC  Recent Labs   Lab 03/09/24  0550 03/08/24  1909   WBC 4.4 5.8   RBC 4.32 4.01   HGB 12.8 12.2   HCT 39.4 36.8   MCV 91 92   MCH 29.6 30.4   MCHC 32.5 33.2   RDW 15.9* 16.1*    224     INR  Recent Labs   Lab 03/08/24  1909   INR 1.10       A/P: Shannon Beckwith is a 63 year old female with a past medical history of Alochol use disorder, Afib, HTN, Subglottic stenosis, hypothyroidism, gastric bypass, JOSE, MDD, and osteoporosis. S She was last dilated in 2016 and was lost to follow up. he presented to clinic 3/8 with increased respiraotyr symptoms. We attempted an MDL with dilation yesterday but the patient went into sustained Afib with RVR which did not resolve until anesthesia was discontinued. Her surgery was deemed unsafe to continue until further cardiac evaluation performed. She is remaining IP until she is able to have a cardiac workup and her airway surgery.    Neuro:  - Pain control:Tyl, oxy prn  - Caffeine for HA  - PTA Lexapro   - CIWA protocol       Respiratory: SGS last dilated 2016. She is an easy mask but not intubatable given the narrowing of her subglottic airway   - Airway escalation protocol - supplemental oxygen, heliox, bipap. IF starting to struggle on these " please call ENT immediately.   - supplemental O2 PRN to keep sats >92%    CV/heme: Afib with RVR that resolved. HTN   - Currently HTD  - Cardiology consult pending  - Hold Eliquis today   - Metop 150 BID  - Restart amlodipine 5mg with increase to 10mg if BP elevated  - Hold hydrochlorothiazide    FEN/GI:  - Regular diet  - NPO midnight   - Bowel regimen: Senna-doc, miralax    :  - voiding independently    Endo  - Hypothyroidism 175mcg synthroid     ID:  - [id number]    PPX:  - SCDs  - IS    Consults  - Med  - Addiction Med  - Cardiology     Dispo: Pending clearance for surgery and surgery.     -- Patient and above plan discussed with Dr. Del Rio.     Christina Rivas MD PGY5  Otolaryngology-Head & Neck Surgery  Please contact ENT with questions by dialing [phone] and entering job code [phone] when prompted.      Physician Attestation   I personally examined and evaluated this patient.  I discussed the patient with the resident/fellow and care team, and agree with the assessment and plan of care as documented in the note.     Key findings: 63yoF with subglottic stenosis and complex PMH including alcohol addiction and atrial fibrillation. No acute events, but still with obvious biphasic stridor.   Appreciate input from Medicine, Addiction Medicine, Cardiology, RD, Pharmacy. Plan to proceed to OR once deemed ready.    Aylin Del Rio MD  Date of Service (when I saw the patient): 03/10/24

## 2024-03-09 NOTE — CONSULTS
[facility]  Consult Note - Hospitalist Service, GOLD TEAM   Date of Admission:  3/8/2024  Consult Requested by: Dr. 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 Medicine consulted for medical co-management.     Subglottic Stenosis   Longstanding history of subglottic stenosis, underwent micro direct laryngoscopy with incision, dilation, steroid injection of subglottic stenosis on 11/21/2012 and again 10/20/2016 but then was lost to follow-up. Was seen in ENT clinic 3/8 and flexible bronchoscopy was done, they made the decision for urgent surgery and she was sent to the ER. Pt developed afib with RVR during pre-procedural anesthesia so procedure was aborted. She did received periop Unasyn. She remains admitted with plan to pursue microlaryngoscopy with incision and dilation in coming days.   [] ARISTICAT score for post-op pulm complications: intermediate risk  [] RCRI Cardiac pre-op risk: class II risk   - Management per ENT primary   - Decadron 8 mg qh8 x3 doses   - Continuous pulse ox    Afib on AC with RVR (resolved)  Pt with history of AF, planning for cardioversion outpatient but needs to be able to be on AC for 1 month consecutive following this so deferred until after procedure. Last seen by cardiology 1/16/2024. On metop 150 mg BID and eliquis. Tachycardia in OR likely 2/2 AF with RVR although was not captured on EKG though anesthesia note says it was AF with RVR. Pt now stable in afib with HR < 100. Pt did not take her metop this AM so possible this contributed, no clear  "signs /sxs of infection, had BMP and CBC yesterday at OSH that were stable.   - Cardiology consulted, appreciate their recs   - Restarted eliquis 5 mg BID as soon as deemed able by surgical team  - Cont Metop 150 g BID  - Telemetry  - CBC, BMP, Mg, Phos    Hx of AUD   Pt reports drinking etoh heavily for the past 10 years, drinking ~1/2 175 ml daily. Last drink about 16 days prior to admission, went through withdrawal at home. Does not appear to be withdrawing on exam today.     - Reasonable to monitor on CIWA although does not currently appear to be withdrawing   - Addiction medicine consult, pt is amenable   - MV/ folate/ thiamine     HTN   Home reg: amlodipine 10 mg daily and hydrochlorothiazide 25 mg daily. Has received IV hydral x2 post-op as well as IV metop 10 mg total and esmolol 100 mg total iso RVR as above.  - Given pt was NPO all day will decrease home amlo to 5 mg, can up-titrate as needed based on BP trend  - Hold home hydrochlorothiazide-  can restart in coming days pending BP trend     Hypothyroidism  PTA on levothyroxine 175 mcg  - cont pta levothyroxine 175 mcg    S/p gastric bypass  - RD consulted, appreciate recs     JOSE  MDD  Takes lexapro at home although has been inconsistent previously when drinking.   - cont home lexapro 10 mg daily     Osteoporosis  DEXA 9/22/2022 with osteoporosis  - can cont home calcium supplement at discharge         The patient's care was discussed with the Attending Physician, Dr. Walker and Patient.    Clinically Significant Risk Factors Present on Admission               # Drug Induced Coagulation Defect: home medication list includes an anticoagulant medication  # Drug Induced Platelet Defect: home medication list includes an antiplatelet medication        # Obesity: Estimated body mass index is 31.77 kg/m  as calculated from the following:    Height as of this encounter: 1.753 m (5' 9\").    Weight as of this encounter: 97.6 kg (215 lb 2.7 oz).              Stefanie " "KRAIG Marks  Hospitalist Service, GOLD TEAM   Securely message with [employer] 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 Medicine consulted for medical co-management.     Pt reports feeling very calm, quite neutral right now. She reports she had some pain in her left leg yesterday that went away, almost did not go in to her ENT appointment today because she was in the ER for much of the day yesterday but then the care coordinator called her so she came in and she is thankful she did. She reports otherwise feeling in her normal state of health prior to the procedure and has no new complaints now. She reports being sober for 16 days, she feels she had \"an epiphany\" and does not want to drink any more, has no had any cravings. She has no other questions or concerns at this time.    Pt denies fevers, chills, rhinorrhea, cough, CP, abdominal pain, dysuria, diarrhea, LE edema.     D/w bedside nursing who has no acute concerns.  D/w ENT resident who said unlikely procedure will be tomorrow, possible it will be Sunday.    Reports taking eliquis, levothyroxine, metoprolol and lexapro consistently at home. She also takes supplements like D3 and calcium.     Past Medical History    Past Medical History:   Diagnosis Date    Anxiety     Arthritis     Chemical dependency (H) sober since July 2016 per PCP, multiple prior inpatient hospital stays for EtOH abuse    COVID-19 " 04/10/2021    Depression     Dyspnea on exertion     Eating disorder     History of 2019 novel coronavirus disease (COVID-19) 04/2021    History of blood transfusion     Hypertension pre hypertensive    Obese     eating disorder    Problem, psychiatric     Alcohlic    Sleep apnea 2000    Not a problem since weight loss surgery     Thyroid disease     hypothyroidism    Tracheal stenosis      Past Surgical History   Past Surgical History:   Procedure Laterality Date    ABDOMEN SURGERY      gastric bypass, elaine-en-y    APPENDECTOMY      ARTHRODESIS FOOT Right 3/30/2017    Procedure: ARTHRODESIS FOOT;  Surgeon: J Carlos Mcdonald MD;  Location: [facility]    ARTHROPLASTY HIP Left 7/27/2021    Procedure: LEFT TOTAL HIP ARTHROPLASTY;  Surgeon: Phil Carrera MD;  Location: [facility]    BRONCHOSCOPY RIGID  2/14/2012    Procedure:BRONCHOSCOPY RIGID; Surgeon:TONNY PENA; [facility]    GRAFT BONE FROM ILIAC CREST Right 3/30/2017    Procedure: GRAFT BONE FROM ILIAC CREST;  Surgeon: J Carlos Mcdonald MD;  [facility]    INJECT STEROID (LOCATION) N/A 10/20/2016    Procedure: INJECT STEROID (LOCATION);  Surgeon: Aylin Del Rio MD;  [facility]: [facility]    LARYNGOSCOPY, ESOPHAGOSCOPY WITH DILATION, COMBINED  2/14/2012    Procedure:COMBINED LARYNGOSCOPY, ESOPHAGOSCOPY WITH DILATION; Direct Laryngoscopy, Rigid Bronchoscopy, Balloon Dilation of Subglottic Stenosis  **latex safe; Surgeon:TONNY PENA; [facility]    LASER CO2 LARYNGOSCOPY, COMPLEX  11/21/2012    Procedure: LASER CO2 LARYNGOSCOPY, COMPLEX;  Microdirect Laryngoscopy, Incision and Dilation,  CO2 Laser, Kenalog Injection;  Surgeon: Aylin Del Rio MD;  [facility]    LASER CO2 LARYNGOSCOPY, COMPLEX N/A 10/20/2016    Procedure: LASER CO2 LARYNGOSCOPY, COMPLEX;  Surgeon: yAlin Del Rio MD;  [facility]    ORTHOPEDIC SURGERY Left 12/19/2017    left total knee    subglottic dilatation      wisdom teeth removed[        Medications   Reviewed patients medications with her as above.      Physical Exam   Vital Signs: Temp: 97.5  F (36.4  C) Temp src: Oral BP: 128/72 Pulse: 101   Resp: 16 SpO2: 98 % O2 Device: None (Room air) Oxygen Delivery: 2 LPM  Weight: 215 lbs 2.7 oz  Constitutional: sitting up in bed, appears comfortable, no apparent distress.  HEENT: Mucous membranes moist, no scleral icterus, no tongue fasciculations   Respiratory: No increased work of breathing, breathing comfortably on RA   Cardiovascular: irregular rhythm   GI: abdomen soft, non tender, non-distended.  Skin: normal skin color, texture, and no jaundice  Musculoskeletal: moving all extremities voluntarily during exam, no LE edema   Neuro: awake, alert, answering all questions appropriately during exam    Medical Decision Making       70 MINUTES SPENT BY ME on the date of service doing chart review, history, exam, documentation & further activities per the note.      Data     I have personally reviewed the following data over the past 24 hrs:    5.8  \   12.2   / 224     137 103 10.1 /  105 (H)   4.1 23 0.58 \     INR:  1.10 PTT:  28   D-dimer:  N/A Fibrinogen:  N/A       Imaging results reviewed over the past 24 hrs:   No results found for this or any previous visit (from the past 24 hour(s)).

## 2024-03-09 NOTE — PLAN OF CARE
Transfer  Transferred from: PACU @ 0100  Via:stretcher   Reason for transfer: Pt appropriate for 6B- improved patient condition  Family: Aware of transfer  Belongings: Received with pt  Chart: Received with pt  Medications: Meds received from old unit with pt  Code Status verified on armband: yes  2 RN Skin Assessment Completed By: Elena MASON RN & Orin LE RN   Med rec completed: yes  Suction/Ambu bag/Flowmeter at bedside: yes    Report received from: Smiley CABALLERO   Pt status:  Neuro: A&Ox4.   Cardiac: Afib, HR 90s-100s. Arrival to unit, 130s/90s. This AM, -140s/100-110s. Overnight medicine crosscover, Cornel Mcgill, aware -- recommend to encourage rest & continue to monitor. No other interventions at this time.   Respiratory: Sating >96% on RA. Upper airway inspiratory wheeze after activity. CONTE. Otherwise LS clear/diminished.   GI/: Adequate urine output. No BM.   Diet/appetite: NPO.   Activity:  SBA, up to chair and in halls. Weak LLE. Transfers well. Uses IV pole and/or walker with assistance when ambulating.   Pain: Headache, PRN tylenol given.   Skin: No new deficits noted.  LDA's: L PIV, saline locked.     Plan: Continue with POC.

## 2024-03-09 NOTE — PROGRESS NOTES
CLINICAL NUTRITION SERVICES - ASSESSMENT NOTE     Nutrition Prescription    RECOMMENDATIONS FOR MDs/PROVIDERS TO ORDER:  Agree with Thiamine 100 mg, folic acid and MVI supplementation and would continue daily   Agree with protonix daily as pt mentioned onset of emesis in October- further GI workup per MD     Malnutrition Status:    Moderate malnutrition in the context of chronic illness     Recommendations already ordered by Registered Dietitian (RD):  calcium citrate + Vitamin D 315 mg, 2 tablets 2x daily   Cholecalciferol 50 mcg daily (2000 units)  B12 500 mcg sublingual     Future/Additional Recommendations:  Monitor appetite, oral intake, tolerance of vitamin/mineral supplements  -Would recommend outpatient check of vitamins (folate, B12, vitamin D, iron, and consider fat soluble vitamins)     Recommend follow up with outpatient GI RD      REASON FOR ASSESSMENT  Shannon Beckwith is a/an 63 year old female assessed by the dietitian for Provider Order -     S/p gastric bypass, assistance with vitamins / supplements     CLINICAL HISTORY   pmhx of 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 Medicine consulted for medical co-management.     NUTRITION HISTORY  Sara was eating eggwhite omelet, banana, 1% milk and coffee during visit. She reported her Shivani-en-y gastric bypass was at least 15 years ago or more. Initially, she was very diligent about taking post bariatric surgery vitamins and eating slowly however, over time, she lost some of these habits.     She still tries for focus on nutrient dense foods and high protein item and limit non-nutritive foods. However, her alcoholism, when she is drinking, she is unable to eat or take any vitamins.  "Last drink was 17 days ago. She has been intermittently drinking for the past 10 years.     Rafael reported she needs to limit pasta and carbs such as bread which she does not need. She reported when involved with [place] adult/teen challenge meals can be limited to less nutrient dense foods due to needing to be served to a larger group.     She reported her lowest weight was 145 lbs and she has slowly regained. She was interested in changing her eating patterns again to overall help her health.       CURRENT NUTRITION ORDERS  Diet: NPO  Intake/Tolerance: n/a     LABS  3/9: Na 135, K+ 4.2 (WNL)  3/8: Mg 2.6 (H), Po 4 (WNL)  Glucose 175 (H)    MEDICATIONS  Decadron  Folic acid  Ferous sulfate 2x daily   Levothyroxine  Thera-vit-M  Prilosec  Senokot  Thiamine    Per H&P, takes calcium and vitamin D at home    ANTHROPOMETRICS  Height: 175.3 cm (5' 9\")  Most Recent Weight: 95.6 kg (210 lb 12.8 oz)    IBW: 65.9 kg  BMI: Obesity Grade I BMI 30-34.9  Weight History:   Wt Readings from Last 25 Encounters:   03/09/24 95.6 kg (210 lb 12.8 oz)   03/08/24 90.7 kg (200 lb)   07/27/21 80.7 kg (178 lb)   03/02/21 79.4 kg (175 lb)   03/30/17 88.5 kg (195 lb 3.2 oz)   10/20/16 93.3 kg (205 lb 11 oz)   08/29/16 94.9 kg (209 lb 3.2 oz)   11/21/12 102.4 kg (225 lb 12 oz)   02/14/12 105.4 kg (232 lb 5.8 oz)     Dosing Weight: 73.3 kg    ASSESSED NUTRITION NEEDS  Estimated Energy Needs: 2122-7244 kcals/day (25 - 30 kcals/kg)  Justification: Maintenance  Estimated Protein Needs: 90+ grams protein/day (1.2 g/kg)  Justification: Increased needs during hospitalization  Estimated Fluid Needs: 9604-4963 mL/day (1 mL/kcal)   Justification: Maintenance    PHYSICAL FINDINGS  See malnutrition section below.    MALNUTRITION  % Intake: < 75% for >/= 1 month (moderate)  % Weight Loss: Weight loss does not meet criteria  Subcutaneous Fat Loss: Facial region:  mild - assessment visual   Muscle Loss: Temporal:  mild - assessment limited to visual as pt eating " breakfast   Fluid Accumulation/Edema: None noted  Malnutrition Diagnosis: Moderate malnutrition in the context of chronic illness     NUTRITION DIAGNOSIS  Inadequate oral intake related to alcohol use and history of gastric bypass as evidenced by patient report and need for additional vitamin/mineral supplementation with higher risk for deficiency      INTERVENTIONS  Implementation  Nutrition Education: RD role in care    -Discussed mediterranean diet as general nutrient dense eating pattern and patient had looked up recipes from [employer]   Collaboration with other providers  Multivitamin/mineral supplement therapy     Goals  Patient to consume % of nutritionally adequate meal trays TID, or the equivalent with supplements/snacks.     Monitoring/Evaluation  Progress toward goals will be monitored and evaluated per protocol.    Ale Kevin RD, LD  Available on [employer]: 5c clinical Dietitian (weekday), Weekend Clinical Dietitian (Weekends)   Clinical Nutrition will no longer be available via paging system.

## 2024-03-09 NOTE — ANESTHESIA PREPROCEDURE EVALUATION
Anesthesia Pre-Procedure Evaluation    Patient: Shannon Beckwith   MRN: [id number] : 1960        Procedure : Procedure(s):  Airway dilation          Past Medical History:   Diagnosis Date    Anxiety     Arthritis     Chemical dependency (H) sober since 2016 per PCP, multiple prior inpatient hospital stays for EtOH abuse    COVID-19 04/10/2021    Depression     Dyspnea on exertion     Eating disorder     History of 2019 novel coronavirus disease (COVID-19) 2021    History of blood transfusion     Hypertension pre hypertensive    Obese     eating disorder    Problem, psychiatric     Alcohlic    Sleep apnea 2000    Not a problem since weight loss surgery     Thyroid disease     hypothyroidism    Tracheal stenosis       Past Surgical History:   Procedure Laterality Date    ABDOMEN SURGERY      gastric bypass, elaine-en-y    APPENDECTOMY      ARTHRODESIS FOOT Right 3/30/2017    Procedure: ARTHRODESIS FOOT;  Surgeon: J Carlos Mcdonald MD;  Location: [facility]    ARTHROPLASTY HIP Left 2021    Procedure: LEFT TOTAL HIP ARTHROPLASTY;  Surgeon: Phil Carrera MD;  Location:  OR    BRONCHOSCOPY RIGID  2012    Procedure:BRONCHOSCOPY RIGID; Surgeon:TONNY PENA; [facility]    GRAFT BONE FROM ILIAC CREST Right 3/30/2017    Procedure: GRAFT BONE FROM ILIAC CREST;  Surgeon: J Carlos Mcdonald MD;  [facility]: [facility]    INJECT STEROID (LOCATION) N/A 10/20/2016    Procedure: INJECT STEROID (LOCATION);  Surgeon: Aylin Del Rio MD;  [facility]: [facility]    LARYNGOSCOPY, ESOPHAGOSCOPY WITH DILATION, COMBINED  2012    Procedure:COMBINED LARYNGOSCOPY, ESOPHAGOSCOPY WITH DILATION; Direct Laryngoscopy, Rigid Bronchoscopy, Balloon Dilation of Subglottic Stenosis  **latex safe; Surgeon:TONNY PENA; [facility]    LASER CO2 LARYNGOSCOPY, COMPLEX  2012    Procedure: LASER CO2 LARYNGOSCOPY, COMPLEX;  Microdirect Laryngoscopy, Incision and Dilation,  CO2 Laser, Kenalog Injection;   Surgeon: Aylin Del Rio MD;  [facility]: [facility]    LASER CO2 LARYNGOSCOPY, COMPLEX N/A 10/20/2016    Procedure: LASER CO2 LARYNGOSCOPY, COMPLEX;  Surgeon: Aylin Del Rio MD;  [facility]: [facility]    ORTHOPEDIC SURGERY Left 2017    left total knee    subglottic dilatation      wisdom teeth removed[        Allergies   Allergen Reactions    Celecoxib Hives and Rash     suspected drug rash    Nkda [No Known Drug Allergy]       Social History     Tobacco Use    Smoking status: Former     Years: 5     Types: Cigarettes     Start date: 1976     Quit date: 1980     Years since quittin.1    Smokeless tobacco: Not on file   Substance Use Topics    Alcohol use: No     Comment: AUG'18 & completed [place] trt      Wt Readings from Last 1 Encounters:   24 95.6 kg (210 lb 12.8 oz)        Anesthesia Evaluation   Pt has had prior anesthetic. Type: General.    History of anesthetic complications       ROS/MED HX  ENT/Pulmonary: Comment: Tracheal stenosis with prior dilation procedures in  and     Patient brought to OR for microlaryngoscopy on 3/8/24 but procedure aborted d/t the following:     Patient went into A fib RVR intra-op during induction. HR to high 190's despite esmolol and metoprolol given. Stopped induction with HR returning to 100-120 range. HTN throughout. Brought patient to PACU for medicine/cardiology consult.     (+) sleep apnea,                                       Neurologic:       Cardiovascular:     (+)  hypertension- -   -  - -           CONTE.             dysrhythmias, a-fib,             METS/Exercise Tolerance:     Hematologic:       Musculoskeletal:       GI/Hepatic:       Renal/Genitourinary:       Endo:     (+)          thyroid problem,     Obesity,       Psychiatric/Substance Use:     (+) psychiatric history anxiety and depression alcohol abuse      Infectious Disease:       Malignancy:  - neg malignancy ROS     Other:            Physical  Exam    Airway        Mallampati: I   TM distance: > 3 FB   Neck ROM: full     Respiratory Devices and Support         Dental       (+) Minor Abnormalities - some fillings, tiny chips      Cardiovascular   cardiovascular exam normal          Pulmonary   pulmonary exam normal                OUTSIDE LABS:  CBC:   Lab Results   Component Value Date    WBC 4.4 03/09/2024    WBC 5.8 03/08/2024    HGB 12.8 03/09/2024    HGB 12.2 03/08/2024    HCT 39.4 03/09/2024    HCT 36.8 03/08/2024     03/09/2024     03/08/2024     BMP:   Lab Results   Component Value Date     03/09/2024     03/08/2024    POTASSIUM 4.2 03/09/2024    POTASSIUM 4.1 03/08/2024    CHLORIDE 102 03/09/2024    CHLORIDE 103 03/08/2024    CO2 24 03/09/2024    CO2 23 03/08/2024    BUN 11.5 03/09/2024    BUN 10.1 03/08/2024    CR 0.58 03/09/2024    CR 0.58 03/08/2024     (H) 03/09/2024     (H) 03/08/2024     COAGS:   Lab Results   Component Value Date    PTT 28 03/08/2024    INR 1.10 03/08/2024     POC:   Lab Results   Component Value Date     (H) 03/31/2017     HEPATIC:   Lab Results   Component Value Date    ALBUMIN 3.3 (L) 03/02/2021    PROTTOTAL 6.0 (L) 03/02/2021    ALT 34 03/02/2021    AST 63 (H) 03/02/2021    ALKPHOS 104 03/02/2021    BILITOTAL 0.9 03/02/2021     OTHER:   Lab Results   Component Value Date    LACT 2.1 (H) 03/02/2021    MARCELO 9.1 03/09/2024    PHOS 4.6 (H) 03/09/2024    MAG 1.9 03/09/2024    TSH 21.20 (H) 03/09/2024    T4 0.73 (L) 03/09/2024       Anesthesia Plan    ASA Status:  3       Anesthesia Type: General.     - Airway: ETT              Consents    Anesthesia Plan(s) and associated risks, benefits, and realistic alternatives discussed. Questions answered and patient/representative(s) expressed understanding.     - Discussed:     - Discussed with:  Patient      - Extended Intubation/Ventilatory Support Discussed: No.      - Patient is DNR/DNI Status: No     Use of blood products discussed:  "No .     Postoperative Care       PONV prophylaxis: Dexamethasone or Solumedrol     Comments:               GARCÍA VAN MD    I have reviewed the pertinent notes and labs in the chart from the past 30 days and (re)examined the patient.  Any updates or changes from those notes are reflected in this note.     # Hyponatremia: Lowest Na = 135 mmol/L in last 30 days, will monitor as appropriate        # Drug Induced Coagulation Defect: home medication list includes an anticoagulant medication   # Obesity: Estimated body mass index is 31.13 kg/m  as calculated from the following:    Height as of this encounter: 1.753 m (5' 9\").    Weight as of this encounter: 95.6 kg (210 lb 12.8 oz).      "

## 2024-03-09 NOTE — CONSULTS
[facility]   Consult Note - Addiction Service     Date of Admission:  3/8/2024    Consult Requested by: Stefanie Marks PA-C  Reason for Consult: AUD, 16 days sober    Assessment & Plan   Sara Beckwith is a 63 year old woman with history of subglottic stenosis s/p multiple dilations, atrial fibrillation on eliquis, HTN, hypothyroidism, MDD, JOSE, and alcohol use disorder who was admitted for urgent airway dilation for subglottic stenosis. Addiction medicine was consulted for alcohol use disorder.     # Alcohol Use Disorder, Severe  # Binge drinking pattern  Sara has a ~10 year history of heavy drinking, often drinking 1/2 of a 1.75 L container of vodka at a time. She has had periods of daily drinking, but more recently she has been going on 3-5 day binges followed by several weeks of not drinking. Her goal has been sobriety for many years and she has been in treatment many times. She also has been actively involved with AA which she has found helpful. Sara stopped drinking on her own about 16 days ago, no concern for withdrawal at this time. She went through withdrawal at home, denies any history of DTs or seizures. She is in the process of re-enrolling at [facility] in [place] and plans to start their intensive 60 day program as soon as her medical concerns are stable. After the intensive program, she will transition to their 12 month program which she thinks will be helpful as she has mostly been in short intensive programs in the past. We discussed different options to maintain goal of sobriety, including medications to manage cravings, including acamprosate and naltrexone. PDMP reviewed- she was prescribed a 3 day course of oxycodone on 3/7 (10 tablets total). She also is currently receiving oxycodone 5mg q6 prn.   - Start acamprosate 666mg TID, if patient does note find this helpful, consider transitioning to naltrexone 50mg daily outpatient once she no  longer is needing opioid pain medications or has any upcoming procedures planned.   - Discontinue high dose gabapentin as no concern for withdrawal. Continue gabapentin 100mg in the morning and afternoon, and 100-300mg in evening as needed. Could consider increasing this in the future for alcohol cravings, chronic leg pain, and anxiety, but would not do that until patient taking consistently.   - Follow up with [facility] after discharge from the hospital. She would like to start the program as soon as possible, which I think is a good idea to prevent relapse.  - Agree with multivitamin, folate, and thiamine    # Mental health:  # MDD, JOSE  - Continue pta lexapro 10 mg daily. Patient has not been taking this regularly for weeks. Encouraged her to take this daily and follow up with her PCP in 2-3 weeks to consider increasing to 20mg daily.   - Once patient is done with [facility] program, recommend re-engaging with prior outpatient therapist in [place]    # Harm Reduction:  - Consider checking hep C pcr reflex, HIV for routine screening, though overall lower risk based on evaluation.    # Immunization review:   - Consider Hep A IgG and Hep B serologies as no [facility] records available     # Peer Support:   -Our peer  will meet the patient if agreeable and still hospitalized on Thursday, to provide additional outpatient resources  -To contact Ariana Peer  from [facility]): call or text: [phone]    # Addiction Social Worker:   Our addiction social worker Akira Sullivan can be contacted if needed, on her pager [phone] or texted/called at [phone]  - Patient is interested in inpatient addiction treatment after discharge. She does not need a new Chem Dep assessment and is working with [facility] to start their intensive inpatient program in the next few weeks.    # Linkage to Care:   - Recommend follow up with PCP at [facility]  "in 2-3 weeks    The patient's care was discussed with the Patient and Medicine Consultant(s).    I spent 120 minutes on the unit/floor managing the care of Shannon Beckwith. Over 50% of my time was spent on the following:   Significant education and counseling spent on: how substance use disorders and dependence occur, and how it can become a chronic relapsing and remitting medical condition.  In addition, the pharmacology of medical treatments including acamprosate, naltrexone and gabapentin, the importance of follow up, as well as motivational interviewing.    Janeth Carreon MD  [facility]   Contact information available via [facility] Paging/Directory  Please see sign in/sign out for up to date coverage information    ChAT team (Addiction Consult Team): Coverage daily 8-4pm     ______________________________________________________________________    Chief Complaint   Alcohol use disorder     History is obtained from the patient    History of Present Illness   Rafael Beckwith is a 63 year old woman with history of subglottic stenosis s/p multiple dilations, atrial fibrillation on eliquis, HTN, hypothyroidism, MDD, JOSE, and alcohol use disorder who was admitted for urgent airway dilation for subglottic stenosis.    Drug/Substance of Choice:  Alcohol    Rafael reports heavy alcohol use for the past 10 years. Her heavy drinking started after her gastric bypass surgery in her 50s in which she \"switched addiction from food to alcohol.\" She often drinks 1/2 of a 1.75 L container of vodka at a time. She has had periods of daily drinking, but more recently she has been going on 3-5 day binges followed by several weeks of not drinking. She reports her alcohol use was a big factor in ending her marriage. She reports her  Jonathon is a good sima and she broke his heart with her drinking. She also has lost jobs due to her drinking. She also feels her anxiety and depression have been " "triggers, though also said, \"what isn't a trigger?\"    Her goal has been sobriety for many years and she has been in treatment many times. She did inpatient treatment multiple times at [facility], with the most recent time being in 2021. She also has done outpatient treatment programs. Patient did participate in a partial hospitalization program in August 2023 and [facility] then [facility] which was helpful. She has been acamprosate while in treatment in the past, but it was hard to tell if it was the medication that was helpful or the treatment program. She also has been actively involved with  which she has found helpful.    Rafael stopped drinking her own about 16 days prior to admission. She went through withdrawal at home. Denies history of DTs, seizures, or complicated withdrawal.    Opioid use history: None    Other substances used: History of smoking cigarettes during college, but quit at that time.    History of snf or group home? No.     Active or prior justice related issues secondary to substance use: No. Denies any DUIs.    History of physical or sex abuse: No    Identified race/ethnicity/important cultural/spiritual/ethnic identities: [place]   Employed: Retired. 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  Housing status/insecurity: Stable, has her own apartment  Where patient lives/what town: [place], [place]  Transportation status/insecurity: Stable  Telephone status/insecurity: STable  Additional family member or friend who can support health goals: Her daughter is currently 8 months sober and living at [facility] in [place]. She also has many friends from  who check in on her.   HIV status: Unknown  Hep C status: Unknown  Hep A status per [facility] or Hep A IgG: Unknown  Hep B status per [facility] or serologies: Unknown    Review of Systems   Review of systems was not needed on this patient    Past Medical History:   Diagnosis Date    Anxiety     " Arthritis     Chemical dependency (H) sober since July 2016 per PCP, multiple prior inpatient hospital stays for EtOH abuse    COVID-19 04/10/2021    Depression     Dyspnea on exertion     Eating disorder     History of 2019 novel coronavirus disease (COVID-19) 04/2021    History of blood transfusion     Hypertension pre hypertensive    Obese     eating disorder    Problem, psychiatric     Alcohlic    Sleep apnea 2000    Not a problem since weight loss surgery     Thyroid disease     hypothyroidism    Tracheal stenosis        Past Surgical History:   Procedure Laterality Date    ABDOMEN SURGERY      gastric bypass, elaine-en-y    APPENDECTOMY      ARTHRODESIS FOOT Right 3/30/2017    Procedure: ARTHRODESIS FOOT;  Surgeon: J Carlos Mcdonald MD;  Location:  OR    ARTHROPLASTY HIP Left 7/27/2021    Procedure: LEFT TOTAL HIP ARTHROPLASTY;  Surgeon: Phil Carrera MD;  Location:  OR    BRONCHOSCOPY RIGID  2/14/2012    Procedure:BRONCHOSCOPY RIGID; Surgeon:TONNY PNEA; Location:[facility] OR    GRAFT BONE FROM ILIAC CREST Right 3/30/2017    Procedure: GRAFT BONE FROM ILIAC CREST;  Surgeon: J Carlos Mcdonald MD;  Location:  OR    INJECT STEROID (LOCATION) N/A 10/20/2016    Procedure: INJECT STEROID (LOCATION);  Surgeon: Aylin Del Rio MD;  Location: [facility] OR    LARYNGOSCOPY, ESOPHAGOSCOPY WITH DILATION, COMBINED  2/14/2012    Procedure:COMBINED LARYNGOSCOPY, ESOPHAGOSCOPY WITH DILATION; Direct Laryngoscopy, Rigid Bronchoscopy, Balloon Dilation of Subglottic Stenosis  **latex safe; Surgeon:TONNY PENA; Location:[facility] OR    LASER CO2 LARYNGOSCOPY, COMPLEX  11/21/2012    Procedure: LASER CO2 LARYNGOSCOPY, COMPLEX;  Microdirect Laryngoscopy, Incision and Dilation,  CO2 Laser, Kenalog Injection;  Surgeon: Aylni Del Rio MD;  Location: [facility] OR    LASER CO2 LARYNGOSCOPY, COMPLEX N/A 10/20/2016    Procedure: LASER CO2 LARYNGOSCOPY, COMPLEX;  Surgeon: Aylin Del Rio MD;  Location: [facility] OR     ORTHOPEDIC SURGERY Left 2017    left total knee    subglottic dilatation      wisdom teeth removed[         Social History   Social History     Socioeconomic History    Marital status:      Spouse name: Not on file    Number of children: Not on file    Years of education: Not on file    Highest education level: Not on file   Occupational History    Not on file   Tobacco Use    Smoking status: Former     Years: 5     Types: Cigarettes     Start date: 1976     Quit date: 1980     Years since quittin.1    Smokeless tobacco: Not on file   Substance and Sexual Activity    Alcohol use: No     Comment: AUG'18 & completed Josh romano    Drug use: No    Sexual activity: Not Currently     Partners: Male     Birth control/protection: Post-menopausal   Other Topics Concern    Not on file   Social History Narrative    Not on file     Social Determinants of Health     Financial Resource Strain: Not on file   Food Insecurity: Not on file   Transportation Needs: Not on file   Physical Activity: Not on file   Stress: Not on file   Social Connections: Not on file   Interpersonal Safety: Not on file   Housing Stability: Not on file       Family History   I have reviewed this patient's family history and updated it with pertinent information if needed.  Family History   Problem Relation Age of Onset    Alcohol/Drug Sister     Arthritis Mother     Thyroid Disease Mother     Depression Sister     Depression Sister     Obesity Sister     Obesity Sister     Obesity Sister     Depression Sister     Hypertension Sister     Thyroid Disease Sister     Diabetes Father     Hypertension Father          Medications   I have reviewed this patient's current medications    Allergies   No Known Allergies    Physical Exam   Temp: 98  F (36.7  C) Temp src: Oral BP: (!) 149/106 Pulse: 97   Resp: 20 SpO2: 98 % O2 Device: None (Room air) Oxygen Delivery: 2 LPM      Gen: No acute distress, well nourished, well  developed  HEENT: NC/AT, MMM, EOMI,   CV: Extremities WWP, pulses assumed   Resp: Breathing comfortably on RA  Abd: Non-distended.  Ext: No significant deformities or trauma, moving all ext freely  Skin: No erythema, no lesions or rashes.   Neuro: No focal neurologic deficit  Psych: Tearful throughout evaluation. Interactive.     Due to regulation of Title 42 of the Code of Federal Regulations (CFR) Part 2: Confidentiality laws apply to this note and the information wherein.  Thus, this note cannot be copy and pasted into any other health care staff's note nor can it be included in general medical records sent to ANY outside agency without the patient's written consent.

## 2024-03-09 NOTE — PLAN OF CARE
Goal Outcome Evaluation:      Plan of Care Reviewed With: patient    Overall Patient Progress: improvingOverall Patient Progress: improving    Outcome Evaluation: Restarted on bariatric MVI- discussed nutrient dense eating patterns

## 2024-03-09 NOTE — PHARMACY-ADMISSION MEDICATION HISTORY
Pharmacy Intern Admission Medication History    Admission medication history is complete. The information provided in this note is only as accurate as the sources available at the time of the update.    Information Source(s): Patient and CareEverywhere/SureScripts via in-person    Pertinent Information:   - Spoke with pt who was knowledgeable about their meds  - Pt stated she has not taken her BP, antidepressant, or vitamins/supplements meds for a couple weeks  - Pt was prescribed naltrexone 50 mg tab (0.5 tabs at bedtime) last August but stated she never took any but was likely to restart per provider once discharged/stable    Changes made to PTA medication list:  Added:   Calcium carbonate  Multivitamin  Deleted: per pt not taking  Aspirin 81 mg tab  Atenolol 50 mg tab  Celecoxib 200 mg cap  Cyclobenzaprine 10 mg tab  Fluoxetine 20 mg cap  Ketorolac 10 mg tab  Changed:   Amoxicillin: added 'for dental work'  Carboxymethylcellulose, Diclofenac, Folic acid, Metoprolol, Thiamine: added freq  Escitalopram: 5 mg every day for 1 week, then 10 mg every day thereafter --> 10 mg every day  Ferrous sulfate: BID --> every day  Gabapentin: 100-300 mg --> 200 mg at bedtime prn. Added prn indications  Levothyroxine 175 mcg --> 200 mcg  Oxycodone: Take 1-2 tablets (5-10 mg) by mouth every 3 hours as needed for pain --> Take 5 mg by mouth every 6 hours as needed for pain    Allergies reviewed with patient and updates made in EHR: yes    Medication History Completed By: Wendy Taylor 3/9/2024 2:06 PM    PTA Med List   Medication Sig Last Dose    acamprosate (CAMPRAL) 333 MG EC tablet Take 2 tablets (666 mg) by mouth 3 times daily     acetaminophen (TYLENOL) 325 MG tablet Take 2 tablets (650 mg) by mouth every 4 hours as needed for other (mild pain) Past Month    amLODIPine (NORVASC) 10 MG tablet Take 10 mg by mouth every morning  Past Month    amoxicillin (AMOXIL) 500 MG tablet Take 2,000 mg by mouth daily as needed (dental work)  More than a month    calcium carbonate 500 mg, elemental, (OSCAL 500) 1250 (500 Ca) MG TABS tablet Take by mouth daily Past Month    carboxymethylcellulose (CARBOXYMETHYLCELLULOSE SODIUM) 0.5 % SOLN ophthalmic solution Place 1-2 drops into both eyes Every 2 hours as needed for dry eyes 3/7/2024 at PM    diclofenac (VOLTAREN) 1 % topical gel Apply 4 g topically 2 times daily 3/7/2024 at PM    ELIQUIS ANTICOAGULANT 5 MG tablet Take 1 Tablet (5 mg) by mouth two times daily. [Further refills to come from PCP]* 3/7/2024 at PM    escitalopram (LEXAPRO) 10 MG tablet Take 10 mg by mouth daily Past Month    ferrous sulfate (FEROSUL) 325 (65 Fe) MG tablet Take 325 mg by mouth daily Past Month    folic acid (FOLVITE) 1 MG tablet Take 1 mg by mouth daily Past Month    gabapentin (NEURONTIN) 100 MG capsule Take 100 mg in the morning and afternoon as needed for pain and 200 mg at bedtime as needed for sleep Past Month    hydrochlorothiazide (HYDRODIURIL) 25 MG tablet Take 1 tablet by mouth daily Past Month    hydrOXYzine (ATARAX) 25 MG tablet Take 1 tablet (25 mg) by mouth every 6 hours as needed for itching or anxiety (with pain, moderate pain) More than a month    ibuprofen (ADVIL/MOTRIN) 200 MG tablet Take 1 tablet (200 mg) by mouth 4 times daily as needed for mild pain DO NOT TAKE while on celebrex 3/7/2024 at PM    levothyroxine (SYNTHROID/LEVOTHROID) 200 MCG tablet Take 200 mcg by mouth daily 3/7/2024 at AM    metoprolol tartrate (LOPRESSOR) 100 MG tablet Take 150 mg by mouth 2 times daily 3/7/2024 at PM    multivitamin, therapeutic (THERA-VIT) TABS tablet Take 1 tablet by mouth daily Past Month    oxyCODONE (ROXICODONE) 5 MG tablet Take 1-2 tablets (5-10 mg) by mouth every 3 hours as needed for pain (Moderate to Severe) (Patient taking differently: Take 5 mg by mouth every 6 hours as needed for pain (Moderate to Severe)) 3/7/2024 at 2000    senna-docusate (SENOKOT-S/PERICOLACE) 8.6-50 MG tablet Take 1-2 tablets by mouth 2  times daily Take while on oral narcotics to prevent or treat constipation. More than a month    thiamine (B-1) 100 MG tablet Take 100 mg by mouth daily Past Month    Vitamin D, Cholecalciferol, 25 MCG (1000 UT) TABS Take 1 tablet by mouth every evening Past Month

## 2024-03-09 NOTE — PLAN OF CARE
Status: Admitted 3/8 for urgent airway dilation for subglottic stenosis. Procedure aborted d/t onset of afib w/ RVR to 180s.  Vitals: Vitally stable, tele afib rates controlled to 90s-100s.  Neuros: Intact. LLE weaker d/t previous surgery/uses L shoe riser at baseline(left at home)  IV: PIV SL  Resp/trach: RA. Sats WNL. Exp wheezing at baseline, congested cough.  Diet: Regular NPO at midnight for surgery.  Bowel status: LBM 3/9  : Voiding spontaneously   Skin: Intact, bruising to R FA.  Pain: Denies  Activity: Up w/ A1 GB walker. Walked in halls x1. Showered this evening.  Plan: Continue current POC.       Arrived from: [facility]   Belongings/meds: Purse  2 RN Skin Assessment Completed by: Yes Thea CRAFT RN and Carley SALMERON RN    Non-intact findings documented (yes/no/NA): n/a

## 2024-03-09 NOTE — PROGRESS NOTES
[facility]    Medicine Daily Consult Note - Hospitalist Service, GOLD TEAM 10    Date of Admission:  3/8/2024    Assessment & Plan   TannaJennifer Beckwith is a 64 yo F with pmhx of 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 Medicine consulted for medical co-management.     Recommendations for today:  --stop high dose gabapentin per Addiction Med recs and restart home gabapentin (ordered for you)  --stop CIWA, not scoring for dosing benzodiazepine and last drink 3 weeks ago per Pt report (ordered for you)  --stop PRN olanzapine and haldol (part of CIWA)--ordered for you  --add PRN antihypertensive as we add back home antihypertensive regimen (ordered for you)  --agree with restart of amlodipine at 5mg and metoprolol 150mg PO BID and monitoring with PRN hydralazine, on steroid burst that is stopping soon which will assist with BP trend  --I communicated with Nutrition and gave okay to add supplements with calcium, vitamin D and B12, appreciate Nutrition recs       Subglottic Stenosis   Longstanding history of subglottic stenosis, underwent micro direct laryngoscopy with incision, dilation, steroid injection of subglottic stenosis on 11/21/2012 and again 10/20/2016 but then was lost to follow-up. Was seen in ENT clinic 3/8 and flexible bronchoscopy was done, they made the decision for urgent surgery and she was sent to the ER. Pt developed afib with RVR during pre-procedural anesthesia so procedure was aborted. She did received periop Unasyn. She remains admitted with plan to pursue microlaryngoscopy with incision and dilation in coming days.   [] ARISTICAT score for post-op pulm complications:  intermediate risk  [] RCRI Cardiac pre-op risk: class II risk   - Management per ENT primary   - Decadron 8 mg qh8 x3 doses per primary team  - Continuous pulse ox     Afib PTA on AC with RVR (resolved)  Pt with history of AF, planning for cardioversion outpatient but needs to be able to be on AC for 1 month consecutive following this so deferred until after procedure. Last seen by cardiology 1/16/2024. On metop 150 mg BID and eliquis. Tachycardia in OR likely 2/2 AF with RVR although was not captured on EKG though anesthesia note says it was AF with RVR. Pt now stable in afib with HR < 100. Pt did not take her metop this AM so possible this contributed, no clear signs /sxs of infection, had BMP and CBC yesterday at OSH that were stable.   - Cardiology consulted, appreciate their recs   - Restart eliquis 5 mg BID as soon as deemed able by surgical team/primary team  - agree with Metop 150 g BID  - Telemetry  - CBC, BMP, Mg, Phos     Hx of AUD   Pt reports drinking etoh heavily for the past 10 years, drinking ~1/2 175 ml daily. Last drink reported 3 weeks prior to admission, went through withdrawal at home. Does not appear to be withdrawing on exam, not scoring on CIWA with nursing.  - Recommend stopping CIWA  - Addiction medicine consult, appreciate recs  - MV/ folate/ thiamine   - Pt's trigger for drinking is reported as grief, she has an upcoming psychology appointment with [facility] provider that I have encouraged her to keep     HTN   Home reg: amlodipine 10 mg daily and hydrochlorothiazide 25 mg daily. Has received IV hydral x2 post-op as well as IV metop 10 mg total and esmolol 100 mg total iso RVR as above.  - Continue amlodipine 5 mg, can up-titrate as needed based on BP trend  - Hold home hydrochlorothiazide-  can restart in coming days pending BP trend      Hypothyroidism  PTA on levothyroxine 175 mcg  - cont pta levothyroxine 175 mcg     S/p gastric bypass  - RD consulted, appreciate recs     "  JOSE  MDD  Takes lexapro at home although has been inconsistent previously when drinking.   - cont home lexapro 10 mg daily      Osteoporosis  DEXA 9/22/2022 with osteoporosis  - can cont home calcium supplement at discharge               Diet: Regular Diet Adult    DVT Prophylaxis: Defer to primary service  Almanza Catheter: Not present  Lines: None     Cardiac Monitoring: ACTIVE order. Indication: Tachyarrhythmias, acute (48 hours)  Code Status: Full Code      Clinically Significant Risk Factors Present on Admission               # Drug Induced Coagulation Defect: home medication list includes an anticoagulant medication         # Obesity: Estimated body mass index is 31.13 kg/m  as calculated from the following:    Height as of this encounter: 1.753 m (5' 9\").    Weight as of this encounter: 95.6 kg (210 lb 12.8 oz).              Disposition Plan      Expected Discharge Date: 03/10/2024                  Recommendations communicated with primary team via note.    Beba Escobar MD  Hospitalist Service, GOLD TEAM 10  [facility]  Securely message with Veset (more info)  Text page via [facility] Paging/Directory   See signed in provider for up to date coverage information  ______________________________________________________________________    Interval History   Pt feeling okay this AM, notes mood is more upbeat and she is happy to be able to eat and have coffee. No nausea, no dyspnea. Hoping to go for a walk later.     Physical Exam   Vital Signs: Temp: 98  F (36.7  C) Temp src: Oral BP: 113/66 Pulse: 98   Resp: 20 SpO2: 97 % O2 Device: None (Room air) Oxygen Delivery: 2 LPM  Weight: 210 lbs 12.8 oz    Gen: NAD, sitting up comfortably in bed, pleasant and cooperative with interview and exam  HEENT: normocephalic, no scleral icterus, no perioral lesions, oral mucosa moist  CV: RRR at time of exam  Pulm: good air movement bilaterally without wheezing  Abd: soft, +bs, " nontender to palpation diffusely, nondistended  LE: no edema bilaterally  Skin: no rash or jaundice on limited exam  Neuro: AOx3, no tremor  Psych: mood-affect congruent      Medical Decision Making             Data     I have personally reviewed the following data over the past 24 hrs:    4.4  \   12.8   / 266     135 102 11.5 /  175 (H)   4.2 24 0.58 \     TSH: 21.20 (H) T4: 0.73 (L) A1C: N/A     INR:  1.10 PTT:  28   D-dimer:  N/A Fibrinogen:  N/A

## 2024-03-09 NOTE — CONSULTS
Cardiology New Consult Note    Date of Service: 03/09/24    ASSESSMENT:   Persistent afib w/ RVR  Rafael Beckwith is a 63 year old female w/ PMHx of afib, HTN, hypothyroidism, alcohol use disorder, and subglottic stenosis admitted for urgent airway dilation and found to have Afib w/ RVR up to 180s in the OR prior to the start of surgery. She is currently hemodynamically stable, asymptomatic, and adequately rate controlled.     Post-op HR in 90s & managed adequately w/ metoprolol. Endorses some chronic SOB and occasional lightheadedness - complicated by subglottic stenosis and acute alcohol withdrawal. Denies new CP or radiating pain. No diaphoresis, palpitations, or syncope. Physical exam unremarkable apart from irregular rhythm consistent w/ known afib. Electrolytes WNL. Multiple EKGs while admitted show afib w/ HR in 80s - 90s. ECHO from 05/2023 shows EF 55-60% and unremarkable.       RECOMMENDATIONS:  - No cardiac contraindication to surgery w/ rate controlled atrial fibrillation  - cardioversion not indicated at this time  - Continue rate control w/ metoprolol tartrate 150 mg BID   - Restart Eliquis 5 daily mg post-op as soon as deemed safe.  - Follow up with outpatient cardiology w/  [facility] for afib management and cardioversion scheduling  - Continue PTA levothyroxine and follow up w/ outpatient cardiology or PCP for hypothyroidism management  - Encourage abstinence from alcohol   - agree w/ addiction medicine consult    Discussed with attending, Dr. Dyana Ambrose    Fellow Addendum:  Agree with information and recommendations here in this note. Patient is currently in rate controlled AF. She does not have a contraindication to surgery for her Afib. Her BB should be continued. She does not have an indications for urgent DCCV, especially given that she cannot be immediately AC. Her AC should be restarted when safe from surgical standpoint. She needs to cut down on EtOH. Her  hypothyroidism also needs treatment. She can follow up with [facility] for consideration of outpatient MEHRAN/DCCV.     Thank you for this consult. Cardiology will sign off at this time. Please page the service with any further questions or concerns.    Jamal Rosales, PGY-5  Cardiovascular Disease Fellow  ----------------------------------------------------------------------------  REASON FOR CONSULT: Afib w/ RVR     HISTORY OF PRESENT ILLNESS:  Rafael is a 63 year old female w/ PMHx of afib, HTN, hypothyroidism, alcohol use disorder, and subglottic stenosis admitted for urgent airway dilation for subglottic stenosis that was stopped after patient went into AF with RVR with Hrs to 180s.     Pt was first diagnosed w/ afib while hospitalized in May 2023 for a left fibula fracture where she was found to be in acute alcohol withdrawal and atrial fibrillation w/w RVR. At that time, she was adequately rate controlled w/ metoprolol and started on Eliquis. She was discharged w/ plans for cardioversion which did not happen.     Seen by outpatient cardiology on 1/16/2024 at [facility] for SOB and to evaluate for cardioversion. Per visit note, exam notable for irregularly irregular rhythm.     Reports last drink was 3 weeks ago and endorses inconsistent medication adherence. She says she has taken Eliquis and metoprolol for the past 14 days but last dose was 3/7 - both held the morning of her surgery. Denies taking all other meds prior to admission.    Admitted on 3/8 for urgent dilation of subglottic stenosis which was aborted prior to starting the procedure due to sustained HR up to 180s. Cardiology consulted for evaluation and management of afib w/ RVR.    PAST MEDICAL HISTORY:  Atrial fibrillation w/ RVR on Eliquis  Hypothyroidism  HTN  Subglottic stenosis   Alcohol use disorder   Depression   Osteoporosis   Arthritis   Anemia  Gastric bypass     CURRENT MEDICATIONS:  Amlodipine 10 mg BID  Eliquis 5 mg  daily  Metoprolol tartrate 150 BID  ASA 81 mg daily  Atenolol 50 mg daily  Hydrochlorothiazide 25 mg daily  Levothyroxine 175 mcg daily      PAST SURGICAL HISTORY:  Past Surgical History:   Procedure Laterality Date    ABDOMEN SURGERY      gastric bypass, elaine-en-y    APPENDECTOMY      ARTHRODESIS FOOT Right 3/30/2017    Procedure: ARTHRODESIS FOOT;  Surgeon: J Carlos Mcdonald MD;  Location: [facility] OR    ARTHROPLASTY HIP Left 7/27/2021    Procedure: LEFT TOTAL HIP ARTHROPLASTY;  Surgeon: Phil Carrera MD;  Location: [facility] OR    BRONCHOSCOPY RIGID  2/14/2012    Procedure:BRONCHOSCOPY RIGID; Surgeon:TONNY PENA; Location:[facility] OR    GRAFT BONE FROM ILIAC CREST Right 3/30/2017    Procedure: GRAFT BONE FROM ILIAC CREST;  Surgeon: J Carlos Mcdonald MD;  Location: [facility] OR    INJECT STEROID (LOCATION) N/A 10/20/2016    Procedure: INJECT STEROID (LOCATION);  Surgeon: Aylin Del Rio MD;  Location: [facility] OR    LARYNGOSCOPY, ESOPHAGOSCOPY WITH DILATION, COMBINED  2/14/2012    Procedure:COMBINED LARYNGOSCOPY, ESOPHAGOSCOPY WITH DILATION; Direct Laryngoscopy, Rigid Bronchoscopy, Balloon Dilation of Subglottic Stenosis  **latex safe; Surgeon:TONNY PENA; Location:[facility] OR    LASER CO2 LARYNGOSCOPY, COMPLEX  11/21/2012    Procedure: LASER CO2 LARYNGOSCOPY, COMPLEX;  Microdirect Laryngoscopy, Incision and Dilation,  CO2 Laser, Kenalog Injection;  Surgeon: Aylin Del Rio MD;  Location: [facility] OR    LASER CO2 LARYNGOSCOPY, COMPLEX N/A 10/20/2016    Procedure: LASER CO2 LARYNGOSCOPY, COMPLEX;  Surgeon: Aylin Del Rio MD;  Location: [facility] OR    ORTHOPEDIC SURGERY Left 12/19/2017    left total knee    subglottic dilatation      wisdom teeth removed[          ALLERGIES     Allergies   Allergen Reactions    Celecoxib Hives and Rash     suspected drug rash    Nkda [No Known Drug Allergy]         FAMILY HISTORY:  Family History   Problem Relation Age of Onset    Alcohol/Drug Sister     Arthritis Mother      Thyroid Disease Mother     Depression Sister     Depression Sister     Obesity Sister     Obesity Sister     Obesity Sister     Depression Sister     Hypertension Sister     Thyroid Disease Sister     Diabetes Father     Hypertension Father         SOCIAL HISTORY:    Pt endorses frequent episodes of binge alcohol consumption - last drink 19 days ago  - says she has social support and attends AA meetings    Social History     Socioeconomic History    Marital status:      Spouse name: Not on file    Number of children: Not on file    Years of education: Not on file    Highest education level: Not on file   Occupational History    Not on file   Tobacco Use    Smoking status: Former     Years: 5     Types: Cigarettes     Start date: 1976     Quit date: 1980     Years since quittin.1    Smokeless tobacco: Not on file   Substance and Sexual Activity    Alcohol use: No     Comment: AUG'18 & completed Josh romano    Drug use: No    Sexual activity: Not Currently     Partners: Male     Birth control/protection: Post-menopausal   Other Topics Concern    Not on file   Social History Narrative    Not on file     Social Determinants of Health     Financial Resource Strain: Not on file   Food Insecurity: Not on file   Transportation Needs: Not on file   Physical Activity: Not on file   Stress: Not on file   Social Connections: Not on file   Interpersonal Safety: Not on file   Housing Stability: Not on file        Review of Systems:   10-point ROS reviewed, & found negative w/ exceptions noted in the HPI.      GEN: NAD  HEENT: no icterus  CV: Irregular rhythm. Regular rate. normal s1/s2, no murmurs/rubs/s3/s4. No JVP.   CHEST: CTAB  ABD: soft, NT/ND, NABS  NEURO: AA&Ox3, fluent/appropriate, motor grossly nonfocal  PSYCH: cooperative, affect appropriate    CBC RESULTS:   Recent Labs   Lab Test 24  0550   WBC 4.4   RBC 4.32   HGB 12.8   HCT 39.4   MCV 91   MCH 29.6   MCHC 32.5   RDW 15.9*   PLT  266      Last Comprehensive Metabolic Panel:  Lab Results   Component Value Date     03/09/2024    POTASSIUM 4.2 03/09/2024    CHLORIDE 102 03/09/2024    CO2 24 03/09/2024    ANIONGAP 9 03/09/2024     (H) 03/09/2024    BUN 11.5 03/09/2024    CR 0.58 03/09/2024    GFRESTIMATED >90 03/09/2024    MARCELO 9.1 03/09/2024       EKG:  3/9:  Afib w/ ventricular rate of 97    3/8:  Afib w/ ventricular rate of 86    TTE:  5/27/23  Result:   Visually Estimated EF: 55-60%  Normal left ventricular ejection fraction estimated at 55-60%.  No obvious regional wall motion/thickening abnormalities.  Normal left atrial size.  No significant valvular heart disease noted.  Atrial fibrillation     Stress Test:  none    Coronary angiogram:  none

## 2024-03-09 NOTE — PLAN OF CARE
"Transfer  Transferred to: 6A  Via: Wheelchair  Family: Aware of transfer  Belongings: Packed and sent with pt  Chart: Delivered with pt to next unit  Medications: Meds sent to new unit with pt  Report given to: 6A RN    Care from: 0700 - 1230    Neuro: A&Ox4.   Cardiac: A-fib 80s-100s. VSS except Bps 140s/100s. PRN hydralazine paramets SBP >175, DBPs>110   Respiratory: Sating >98% on RA.  GI/: Adequate urine output. BM X1  Diet/appetite: Tolerating regular diet. Eating well.  Activity:  SBA with walker, LLE weakness  Pain: Moderate headache manages with PRN tylneol and oxy  Skin: No new deficits noted. R forearm bruising, blanchable  LDA's: L hand PIV - SL    /66 (BP Location: Left arm, Cuff Size: Adult Large)   Pulse 98   Temp 98  F (36.7  C) (Oral)   Resp 20   Ht 1.753 m (5' 9\")   Wt 95.6 kg (210 lb 12.8 oz)   SpO2 97%   BMI 31.13 kg/m      Plan: Continue to monitor Pt status and report changes to primary treatment team. Encourage ambulation/up to chair.     "

## 2024-03-09 NOTE — ANESTHESIA POSTPROCEDURE EVALUATION
Patient: Shannon Beckwith    Procedure: Procedure(s):  MICROLARYNGOSCOPY WITH INCISION, DILATION, STEROID INJECTION FOR SUBGLOTTIC STENOSIS       Anesthesia Type:  General    Note:  Disposition: Disposition Change/Cancellation; Admission            Disposition Change: Unplanned admission/ICU admission   Postop Pain Control: Uneventful            Sign Out: Well controlled pain   PONV: No   Neuro/Psych: Uneventful            Sign Out: Acceptable/Baseline neuro status   Airway/Respiratory: Uneventful            Sign Out: Acceptable/Baseline resp. status   CV/Hemodynamics:             Events: Arrhythmia            Sign Out: Detailed CV status               Blood Pressure: HypERtension               Rate/Rhythm: AFib               Perfusion:  Adequate perfusion indices   Other NRE:    DID A NON-ROUTINE EVENT OCCUR? YES    Event details/Postop Comments:  Patient went into A fib RVR intra-op during induction. HR to high 190's despite esmolol and metoprolol given. Stopped induction with HR returning to 100-120 range. HTN throughout. Brought patient to PACU for medicine/cardiology consult.            Last vitals:  Vitals Value Taken Time   /97 03/08/24 1930   Temp 36.4  C (97.5  F) 03/08/24 1730   Pulse 98 03/08/24 1937   Resp 16 03/08/24 1830   SpO2 99 % 03/08/24 1937   Vitals shown include unfiled device data.    Electronically Signed By: ROYCE LEAHY MD  March 8, 2024  7:38 PM

## 2024-03-10 ENCOUNTER — ANESTHESIA (OUTPATIENT)
Dept: SURGERY | Facility: CLINIC | Age: 64
DRG: 144 | End: 2024-03-10
Payer: COMMERCIAL

## 2024-03-10 VITALS
HEART RATE: 76 BPM | SYSTOLIC BLOOD PRESSURE: 147 MMHG | DIASTOLIC BLOOD PRESSURE: 108 MMHG | OXYGEN SATURATION: 98 % | TEMPERATURE: 97.4 F | BODY MASS INDEX: 31.1 KG/M2 | WEIGHT: 210 LBS | HEIGHT: 69 IN | RESPIRATION RATE: 16 BRPM

## 2024-03-10 LAB
ANION GAP SERPL CALCULATED.3IONS-SCNC: 8 MMOL/L (ref 7–15)
BUN SERPL-MCNC: 15.7 MG/DL (ref 8–23)
CALCIUM SERPL-MCNC: 8.7 MG/DL (ref 8.8–10.2)
CHLORIDE SERPL-SCNC: 105 MMOL/L (ref 98–107)
CREAT SERPL-MCNC: 0.66 MG/DL (ref 0.51–0.95)
DEPRECATED HCO3 PLAS-SCNC: 27 MMOL/L (ref 22–29)
EGFRCR SERPLBLD CKD-EPI 2021: >90 ML/MIN/1.73M2
ERYTHROCYTE [DISTWIDTH] IN BLOOD BY AUTOMATED COUNT: 15.9 % (ref 10–15)
GLUCOSE BLDC GLUCOMTR-MCNC: 131 MG/DL (ref 70–99)
GLUCOSE SERPL-MCNC: 127 MG/DL (ref 70–99)
HCT VFR BLD AUTO: 34.8 % (ref 35–47)
HGB BLD-MCNC: 11.7 G/DL (ref 11.7–15.7)
MCH RBC QN AUTO: 31.2 PG (ref 26.5–33)
MCHC RBC AUTO-ENTMCNC: 33.6 G/DL (ref 31.5–36.5)
MCV RBC AUTO: 93 FL (ref 78–100)
PLATELET # BLD AUTO: 244 10E3/UL (ref 150–450)
POTASSIUM SERPL-SCNC: 4.1 MMOL/L (ref 3.4–5.3)
RBC # BLD AUTO: 3.75 10E6/UL (ref 3.8–5.2)
SODIUM SERPL-SCNC: 140 MMOL/L (ref 135–145)
WBC # BLD AUTO: 5.9 10E3/UL (ref 4–11)

## 2024-03-10 PROCEDURE — 999N000141 HC STATISTIC PRE-PROCEDURE NURSING ASSESSMENT: Performed by: OTOLARYNGOLOGY

## 2024-03-10 PROCEDURE — 370N000017 HC ANESTHESIA TECHNICAL FEE, PER MIN: Performed by: OTOLARYNGOLOGY

## 2024-03-10 PROCEDURE — 360N000083 HC SURGERY LEVEL 3 W/ FLUORO, PER MIN: Performed by: OTOLARYNGOLOGY

## 2024-03-10 PROCEDURE — 258N000003 HC RX IP 258 OP 636

## 2024-03-10 PROCEDURE — 3E0F33Z INTRODUCTION OF ANTI-INFLAMMATORY INTO RESPIRATORY TRACT, PERCUTANEOUS APPROACH: ICD-10-PCS | Performed by: OTOLARYNGOLOGY

## 2024-03-10 PROCEDURE — 250N000011 HC RX IP 250 OP 636

## 2024-03-10 PROCEDURE — 250N000009 HC RX 250

## 2024-03-10 PROCEDURE — 272N000001 HC OR GENERAL SUPPLY STERILE: Performed by: OTOLARYNGOLOGY

## 2024-03-10 PROCEDURE — 250N000013 HC RX MED GY IP 250 OP 250 PS 637: Performed by: STUDENT IN AN ORGANIZED HEALTH CARE EDUCATION/TRAINING PROGRAM

## 2024-03-10 PROCEDURE — 250N000013 HC RX MED GY IP 250 OP 250 PS 637

## 2024-03-10 PROCEDURE — 36415 COLL VENOUS BLD VENIPUNCTURE: CPT

## 2024-03-10 PROCEDURE — 250N000011 HC RX IP 250 OP 636: Performed by: OTOLARYNGOLOGY

## 2024-03-10 PROCEDURE — 80048 BASIC METABOLIC PNL TOTAL CA: CPT

## 2024-03-10 PROCEDURE — 88305 TISSUE EXAM BY PATHOLOGIST: CPT | Mod: 26 | Performed by: STUDENT IN AN ORGANIZED HEALTH CARE EDUCATION/TRAINING PROGRAM

## 2024-03-10 PROCEDURE — 710N000010 HC RECOVERY PHASE 1, LEVEL 2, PER MIN: Performed by: OTOLARYNGOLOGY

## 2024-03-10 PROCEDURE — 0C7S8ZZ DILATION OF LARYNX, VIA NATURAL OR ARTIFICIAL OPENING ENDOSCOPIC: ICD-10-PCS | Performed by: OTOLARYNGOLOGY

## 2024-03-10 PROCEDURE — C1726 CATH, BAL DIL, NON-VASCULAR: HCPCS | Performed by: OTOLARYNGOLOGY

## 2024-03-10 PROCEDURE — 85014 HEMATOCRIT: CPT

## 2024-03-10 PROCEDURE — 88305 TISSUE EXAM BY PATHOLOGIST: CPT | Mod: TC | Performed by: OTOLARYNGOLOGY

## 2024-03-10 PROCEDURE — 0CBS8ZX EXCISION OF LARYNX, VIA NATURAL OR ARTIFICIAL OPENING ENDOSCOPIC, DIAGNOSTIC: ICD-10-PCS | Performed by: OTOLARYNGOLOGY

## 2024-03-10 PROCEDURE — 31541 LARYNSCOP W/TUMR EXC + SCOPE: CPT | Performed by: ANESTHESIOLOGY

## 2024-03-10 PROCEDURE — 250N000025 HC SEVOFLURANE, PER MIN: Performed by: OTOLARYNGOLOGY

## 2024-03-10 RX ORDER — LIDOCAINE 40 MG/G
CREAM TOPICAL
Status: DISCONTINUED | OUTPATIENT
Start: 2024-03-10 | End: 2024-03-10 | Stop reason: HOSPADM

## 2024-03-10 RX ORDER — ONDANSETRON 2 MG/ML
4 INJECTION INTRAMUSCULAR; INTRAVENOUS EVERY 30 MIN PRN
Status: DISCONTINUED | OUTPATIENT
Start: 2024-03-10 | End: 2024-03-10 | Stop reason: HOSPADM

## 2024-03-10 RX ORDER — SODIUM CHLORIDE, SODIUM LACTATE, POTASSIUM CHLORIDE, CALCIUM CHLORIDE 600; 310; 30; 20 MG/100ML; MG/100ML; MG/100ML; MG/100ML
INJECTION, SOLUTION INTRAVENOUS CONTINUOUS
Status: DISCONTINUED | OUTPATIENT
Start: 2024-03-10 | End: 2024-03-10 | Stop reason: HOSPADM

## 2024-03-10 RX ORDER — AMPICILLIN AND SULBACTAM 2; 1 G/1; G/1
3 INJECTION, POWDER, FOR SOLUTION INTRAMUSCULAR; INTRAVENOUS ONCE
Status: DISCONTINUED | OUTPATIENT
Start: 2024-03-10 | End: 2024-03-10

## 2024-03-10 RX ORDER — ACETAMINOPHEN 325 MG/1
650 TABLET ORAL EVERY 4 HOURS PRN
Qty: 50 TABLET | Refills: 0 | Status: SHIPPED | OUTPATIENT
Start: 2024-03-10

## 2024-03-10 RX ORDER — ALBUTEROL SULFATE 0.83 MG/ML
2.5 SOLUTION RESPIRATORY (INHALATION) EVERY 4 HOURS PRN
Status: DISCONTINUED | OUTPATIENT
Start: 2024-03-10 | End: 2024-03-10 | Stop reason: HOSPADM

## 2024-03-10 RX ORDER — ESMOLOL HYDROCHLORIDE 10 MG/ML
INJECTION INTRAVENOUS PRN
Status: DISCONTINUED | OUTPATIENT
Start: 2024-03-10 | End: 2024-03-10

## 2024-03-10 RX ORDER — ONDANSETRON 4 MG/1
4 TABLET, ORALLY DISINTEGRATING ORAL EVERY 30 MIN PRN
Status: DISCONTINUED | OUTPATIENT
Start: 2024-03-10 | End: 2024-03-10 | Stop reason: HOSPADM

## 2024-03-10 RX ORDER — FLUTICASONE PROPIONATE 220 UG/1
2 AEROSOL, METERED RESPIRATORY (INHALATION) 2 TIMES DAILY
Qty: 12 G | Refills: 5 | Status: SHIPPED | OUTPATIENT
Start: 2024-03-10

## 2024-03-10 RX ORDER — FENTANYL CITRATE 50 UG/ML
50 INJECTION, SOLUTION INTRAMUSCULAR; INTRAVENOUS EVERY 5 MIN PRN
Status: DISCONTINUED | OUTPATIENT
Start: 2024-03-10 | End: 2024-03-10 | Stop reason: HOSPADM

## 2024-03-10 RX ORDER — FENTANYL CITRATE 50 UG/ML
25 INJECTION, SOLUTION INTRAMUSCULAR; INTRAVENOUS EVERY 5 MIN PRN
Status: DISCONTINUED | OUTPATIENT
Start: 2024-03-10 | End: 2024-03-10 | Stop reason: HOSPADM

## 2024-03-10 RX ORDER — SODIUM CHLORIDE, SODIUM LACTATE, POTASSIUM CHLORIDE, CALCIUM CHLORIDE 600; 310; 30; 20 MG/100ML; MG/100ML; MG/100ML; MG/100ML
INJECTION, SOLUTION INTRAVENOUS CONTINUOUS PRN
Status: DISCONTINUED | OUTPATIENT
Start: 2024-03-10 | End: 2024-03-10

## 2024-03-10 RX ORDER — LIDOCAINE HYDROCHLORIDE 40 MG/ML
SOLUTION TOPICAL PRN
Status: DISCONTINUED | OUTPATIENT
Start: 2024-03-10 | End: 2024-03-10 | Stop reason: HOSPADM

## 2024-03-10 RX ORDER — DEXAMETHASONE SODIUM PHOSPHATE 4 MG/ML
INJECTION, SOLUTION INTRA-ARTICULAR; INTRALESIONAL; INTRAMUSCULAR; INTRAVENOUS; SOFT TISSUE PRN
Status: DISCONTINUED | OUTPATIENT
Start: 2024-03-10 | End: 2024-03-10

## 2024-03-10 RX ORDER — NALOXONE HYDROCHLORIDE 0.4 MG/ML
0.1 INJECTION, SOLUTION INTRAMUSCULAR; INTRAVENOUS; SUBCUTANEOUS
Status: DISCONTINUED | OUTPATIENT
Start: 2024-03-10 | End: 2024-03-10 | Stop reason: HOSPADM

## 2024-03-10 RX ORDER — OXYCODONE HYDROCHLORIDE 5 MG/1
5 TABLET ORAL
Status: DISCONTINUED | OUTPATIENT
Start: 2024-03-10 | End: 2024-03-10 | Stop reason: HOSPADM

## 2024-03-10 RX ORDER — LIDOCAINE HYDROCHLORIDE 20 MG/ML
INJECTION, SOLUTION INFILTRATION; PERINEURAL PRN
Status: DISCONTINUED | OUTPATIENT
Start: 2024-03-10 | End: 2024-03-10

## 2024-03-10 RX ORDER — ACETAMINOPHEN 325 MG/1
650 TABLET ORAL
Status: DISCONTINUED | OUTPATIENT
Start: 2024-03-10 | End: 2024-03-10 | Stop reason: HOSPADM

## 2024-03-10 RX ORDER — PROPOFOL 10 MG/ML
INJECTION, EMULSION INTRAVENOUS PRN
Status: DISCONTINUED | OUTPATIENT
Start: 2024-03-10 | End: 2024-03-10

## 2024-03-10 RX ORDER — TRIAMCINOLONE ACETONIDE 40 MG/ML
INJECTION, SUSPENSION INTRA-ARTICULAR; INTRAMUSCULAR PRN
Status: DISCONTINUED | OUTPATIENT
Start: 2024-03-10 | End: 2024-03-10 | Stop reason: HOSPADM

## 2024-03-10 RX ORDER — HYDROMORPHONE HCL IN WATER/PF 6 MG/30 ML
0.4 PATIENT CONTROLLED ANALGESIA SYRINGE INTRAVENOUS EVERY 5 MIN PRN
Status: DISCONTINUED | OUTPATIENT
Start: 2024-03-10 | End: 2024-03-10 | Stop reason: HOSPADM

## 2024-03-10 RX ORDER — HYDROMORPHONE HCL IN WATER/PF 6 MG/30 ML
0.2 PATIENT CONTROLLED ANALGESIA SYRINGE INTRAVENOUS EVERY 5 MIN PRN
Status: DISCONTINUED | OUTPATIENT
Start: 2024-03-10 | End: 2024-03-10 | Stop reason: HOSPADM

## 2024-03-10 RX ADMIN — PROPOFOL 100 MG: 10 INJECTION, EMULSION INTRAVENOUS at 08:35

## 2024-03-10 RX ADMIN — Medication 40 MG: at 08:22

## 2024-03-10 RX ADMIN — CLONIDINE HYDROCHLORIDE 0.1 MG: 0.1 TABLET ORAL at 05:40

## 2024-03-10 RX ADMIN — SUGAMMADEX 200 MG: 100 INJECTION, SOLUTION INTRAVENOUS at 08:46

## 2024-03-10 RX ADMIN — SODIUM CHLORIDE, POTASSIUM CHLORIDE, SODIUM LACTATE AND CALCIUM CHLORIDE: 600; 310; 30; 20 INJECTION, SOLUTION INTRAVENOUS at 08:13

## 2024-03-10 RX ADMIN — ESMOLOL HYDROCHLORIDE 10 MG: 10 INJECTION, SOLUTION INTRAVENOUS at 08:30

## 2024-03-10 RX ADMIN — CLONIDINE HYDROCHLORIDE 0.1 MG: 0.1 TABLET ORAL at 10:27

## 2024-03-10 RX ADMIN — LIDOCAINE HYDROCHLORIDE 100 MG: 20 INJECTION, SOLUTION INFILTRATION; PERINEURAL at 08:20

## 2024-03-10 RX ADMIN — METOPROLOL TARTRATE 25 MG: 50 TABLET, FILM COATED ORAL at 08:16

## 2024-03-10 RX ADMIN — AMPICILLIN SODIUM AND SULBACTAM SODIUM 3 G: 2; 1 INJECTION, POWDER, FOR SOLUTION INTRAMUSCULAR; INTRAVENOUS at 07:55

## 2024-03-10 RX ADMIN — DEXAMETHASONE SODIUM PHOSPHATE 8 MG: 4 INJECTION, SOLUTION INTRA-ARTICULAR; INTRALESIONAL; INTRAMUSCULAR; INTRAVENOUS; SOFT TISSUE at 08:35

## 2024-03-10 RX ADMIN — METOPROLOL TARTRATE 25 MG: 50 TABLET, FILM COATED ORAL at 08:32

## 2024-03-10 RX ADMIN — LIDOCAINE HYDROCHLORIDE 110 MG: 20 INJECTION, SOLUTION INFILTRATION; PERINEURAL at 08:21

## 2024-03-10 ASSESSMENT — ACTIVITIES OF DAILY LIVING (ADL)
ADLS_ACUITY_SCORE: 28

## 2024-03-10 NOTE — PLAN OF CARE
Status: Admitted 3/8 for urgent airway dilation for subglottic stenosis. Procedure aborted d/t onset of afib w/ RVR to 180s.  Vitals: Vitally stable, tele afib rates controlled to 90s-100s.  Neuros: Intact. LLE weaker d/t previous surgery/uses.  IV: PIV SL  Resp/trach: Cont SAT monitoring. On RA.   Diet: NPO at midnight for surgery.  Bowel status: LBM 3/9  : Voiding spontaneously   Skin: Intact.  Pain: Denies  Activity: Up w/ A1 GB walker. Last OR scrub completed.  Plan: Continue current POC. OR today for microlaryngoscopy with incision, dilation, steroid injection.

## 2024-03-10 NOTE — OP NOTE
PROCEDURE(S):  Microdirect laryngoscopy  Biopsy and Incision of subglottic stenosis under telescopic visualization  Steroid injection to subglottic stenosis under telescopic visualization  Balloon dilation of subglottic stenosis under telescopic visualization    PRE-OPERATIVE DIAGNOSIS:   Subglottic Stenosis    POST-OPERATIVE DIAGNOSIS:   As above    SURGEON: Aylin Del Rio MD MPH    ASSISTANT(S): Kathryn Moyer MD     ANAESTHESIA: General, with intermittent endolaryngeal/endotracheal intubation     INDICATIONS FOR PROCEDURE:   Shannon Beckwith is a 63 year old year old female with a history of dyspnea who was noted to have recurrent subglottic stenosis. Surgery was originally urgently set up two days prior, but upon induction the patient developed rapid ventricular response to her known atrial fibrillation. After medical optimization a plan was made to attempt an airway procedure again. The patient wished to proceed and presented for the procedure(s) listed above.    DESCRIPTION OF PROCEDURE:   The patient was brought to the operating room and placed supine. A time-out was called to verify patient identity, operative site, and planned procedure. The operative site was prepped in the usual clean fashion. Moist gauze eye pads were placed and secured. A head wrap was placed, and custom molded thermoplastic tooth guards were placed. General anesthesia was induced with mask ventilation by Anesthesia. Direct laryngoscopy was then performed with an Ossoff-Pilling laryngoscope, which was placed in suspension. Ventilation was established via endotracheal tube placement above the stenosis and good chest rise was observed. The vocal folds were examined with 0 degree telescope with findings as below. The laryngoscope was slightly advanced to allow improved access to the area of stenosis. Tracheoscopy revealed no additional lesions or areas of narrowing.    Biopsies were taken under telescopic visualization at the 8  o'clock position. Incisions were made with scissors in the most prominent areas of the stenosis, at 8, 12, and 3 o'clock. The laser was not available due to the non-elective timing of the case. The endotracheal tube was replaced as needed to maintain good respiratory support.     After the incisions were completed, ~1.2cc of Kenalog-40 was injected into the stenosis under telescopic visualization.    A Cre pulmonary dilating balloon was then used to dilate the stenotic site under telescopic visualization. The balloon was inflated to approximately 4.5 mis, which corresponds to approximately 14.5 mm diameter, and held for a total of 2 minutes with brief breaks for ventilation and reperfusion.    After cottonoid and sponge counts were confirmed correct, the airway was suctioned and 2 cc of 4% lidocaine were applied transglottically for laryngotracheal anesthesia. The laryngoscope and tooth guards were removed. The lips, teeth, and tongue were examined and no injuries were noted. The oropharynx was suctioned clean. Care of the patient was returned to Anesthesia.      FINDINGS:   Easy mask; easy laryngeal exposure with Ossoff Pilling laryngoscope..  Subglottic stenosis, starting 12 mm below the vocal folds, 16 mm long. Maximal diameter 5 mm. No granulation. No purulence.      SPECIMEN(S):   Subglottis      DRAINS: None    ESTIMATED BLOOD LOSS: Minimal    COMPLICATIONS: None apparent    DISPOSITION: Stable to PACU    ATTENDING PRESENCE STATEMENT:  I was present and participating for the entire procedure from beginning to completion.

## 2024-03-10 NOTE — PLAN OF CARE
Discharge time/date: 3/10/24 at 1215  Walked or Wheelchair: W/c  PIV removed: Yes  Reviewed AVS with patient: Yes  Medication due times added to AVS in EPIC: Yes  Verbalized understanding of discharge with teachback: Yes  Medications retrieved from pharmacy: Yes  Supplies sent home: N/A  Belongings from security with patient: N/A

## 2024-03-10 NOTE — ANESTHESIA PROCEDURE NOTES
Airway         Procedure Start/Stop Times: 3/10/2024 8:26 AM  Staff -        Anesthesiologist:  Flo Anderson MD       Resident/Fellow: Roz Jones MD       Performed By: other anesthesia staffIndications and Patient Condition       Indications for airway management: priscilla-procedural       Induction type:intravenous       Mask difficulty assessment: 1 - vent by mask    Final Airway Details       Final airway type: endotracheal airway       Successful airway: ETT - single  Endotracheal Airway Details        ETT size (mm): 5.0       Cuffed: yes    Post intubation assessment        Placement verified by: capnometry, equal breath sounds and chest rise        Ease of procedure: easy       Dentition: Unchanged       Dental guard used and removed.    Medication(s) Administered   Medication Administration Time: 3/10/2024 8:26 AM    Additional Comments       Patient was easy mask ventilation. Patient intubated with 5.0 ETT by ENT surgeon multiple times as needed throughout the procedure to maintain oxygen saturations.

## 2024-03-10 NOTE — DISCHARGE SUMMARY
"Discharge Summary  Shannon Beckwith  [id number]  1960    Date of Admission: 3/8/2024  Date of Discharge: 3/10/24    Admission Diagnosis: Subglottic stenosis [J38.6]  Dysphonia [R49.0]  History of gastric bypass [Z98.84]  Chronic atrial fibrillation (H) [I48.20]  Alcoholism (H) [F10.20]  Depression, unspecified depression type [F32.A]  Anxiety [F41.9]  Respiratory distress [R06.03]  Discharge Diagnosis: Same  Moderate protein malnutrition    Procedures:  Date: 3/10/24  Procedure(s):  Microdirect laryngoscopy and Airway dilation    Pathology: Pending     HPI: Shannon Beckwith is a 63 year old female with a past medical history of Alochol use disorder, Afib, HTN, Subglottic stenosis, hypothyroidism, gastric bypass, JOSE, MDD, and osteoporosis. She was last dilated in 2016 and was lost to follow up. She presented to clinic 3/8 with increased respiraotyr symptoms. We attempted an MDL with dilation on 3/8/24 but the patient went into sustained Afib with RVR which did not resolve until anesthesia was discontinued. Her surgery was deemed unsafe to continue until further cardiac evaluation had been performed.     Hospital Course: After the above mentioned procedure was aborted, Cardiology was consulted. Behavioral Health was also consulted due to ongoing history of alcohol use disorder. After the patient's A Fib had stabilized, she was taken to the OR on 3/10/24 for airway dilation. At discharge, the patient's pain was well controlled, the patient was voiding on her own, and she was ambulating and tolerating a regular diet.     Discharge Exam:  Vitals:    03/10/24 0542 03/10/24 0703 03/10/24 0743 03/10/24 0902   BP: (!) 130/108 (!) 142/97  (!) 123/90   BP Location:  Left arm     Cuff Size:       Pulse:  74 69 79   Resp:  16 20 14   Temp:  97.9  F (36.6  C)  97.1  F (36.2  C)   TempSrc:  Oral  Oral   SpO2:  97% 96%    Weight:   95.3 kg (210 lb)    Height:   1.753 m (5' 9\")        General: A&O x 3, No acute " distress  Respiratory: Breathing non-labored on room air, post-OP hoarseness, no accessory muscle use.     Discharge Medications:     Medication List        Started      acamprosate 333 MG EC tablet  Commonly known as: CAMPRAL  666 mg, Oral, 3 TIMES DAILY     fluticasone 220 MCG/ACT inhaler  Commonly known as: FLOVENT HFA  2 puffs, Inhalation, 2 TIMES DAILY     omeprazole 20 MG DR capsule  Commonly known as: PriLOSEC  20 mg, Oral, 2 TIMES DAILY            Modified      * acetaminophen 325 MG tablet  Commonly known as: TYLENOL  650 mg, Oral, EVERY 4 HOURS PRN  What changed: Another medication with the same name was added. Make sure you understand how and when to take each.     * acetaminophen 325 MG tablet  Commonly known as: TYLENOL  650 mg, Oral, EVERY 4 HOURS PRN  What changed: You were already taking a medication with the same name, and this prescription was added. Make sure you understand how and when to take each.           * This list has 2 medication(s) that are the same as other medications prescribed for you. Read the directions carefully, and ask your doctor or other care provider to review them with you.                Discontinued      oxyCODONE 5 MG tablet  Commonly known as: ROXICODONE     senna-docusate 8.6-50 MG tablet  Commonly known as: SENOKOT-S/PERICOLACE            ASK your doctor about these medications      naltrexone 50 MG tablet  Commonly known as: DEPADE/REVIA              Discharge Procedure Orders   Diet Instructions   Order Comments: Resume pre procedure diet     Check with Provider when to start anticoagulants   Order Comments: Restart Eliquis on 3/12/24.     Discharge Instructions   Order Comments: Patient to follow up with surgeon in 3-4 weeks     No Alcohol       Dispo: To home in good condition. All of the patient's questions/concerns have been addressed at this time.       Kathryn Moyer MD  Otolaryngology - Head & Neck Surgery PGY-2    Addendum:  Based on RD evaluation,  patient would benefit from MVI. This will be recommended, and an outpatient RD referral will also be offered.

## 2024-03-10 NOTE — PLAN OF CARE
Status: Admitted 3/8 for urgent airway dilation for subglottic stenosis. Procedure aborted d/t onset of afib w/ RVR to 180s.  Vitals: Vitally stable, tele afib rates controlled to 80s-90s.  Neuros: Intact. LLE weaker d/t previous surgery/uses L shoe riser at baseline(left at home)  IV: PIV SL-removed prior to discharge.  Resp/trach: RA. Sats WNL. Exp wheezing at baseline, congested cough.  Diet: Regular, tolerating diet post op. Denies nausea.  Bowel status: LBM 3/9  : Voiding spontaneously, PVR 0 ml.  Skin: Intact, bruising to R FA.  Pain: Denies  Activity: Up w/ A1 GB walker.   Plan: Continue current POC

## 2024-03-10 NOTE — BRIEF OP NOTE
[facility]    Brief Operative Note    Pre-operative diagnosis: Subglottic stenosis  Post-operative diagnosis Same as pre-operative diagnosis    Procedure: Airway dilation, N/A - Esophagus    Surgeon: Surgeon(s) and Role:     * Aylin Del Rio MD - Primary  Anesthesia: General   Estimated Blood Loss: Minimal    Drains: None  Specimens:   ID Type Source Tests Collected by Time Destination   1 : Subglottis Tissue Other SURGICAL PATHOLOGY EXAM Aylin Del Rio MD 3/10/2024  8:30 AM      Findings:   Easy mask and laryngeal exposure. Stenosis began 12 below and 16 mm long, 5 mm diameter. No granulation .  Complications: None.  Implants: * No implants in log *    Kathryn Moyer MD

## 2024-03-10 NOTE — OP NOTE
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 The patient wished to proceed with surgery and presented for the procedure(s) listed above. We reviewed perioperative risks, including bleeding/infection/pain, injury to surrounding structures, potential changes to tongue/voice/swallow function, risk of needing additional procedures (e.g., due to persistence or recurrence), and risks of anesthesia (including heart attack, stroke, death). She elected to proceed and written informed consent was performed.    DESCRIPTION OF PROCEDURE:   The patient was brought to the operating room and placed supine. A time-out was called to verify patient identity, operative site, and planned procedure. The operative site was prepped in the usual clean fashion. A head wrap was placed, and custom molded thermoplastic tooth guards were placed. However, upon induction, the patient was noted to develop a very rapid heart rate that was unresponsive to medical management. Therefore the procedure was aborted and no surgical steps were undertaken.    The tooth guards were removed. The lips, teeth, and tongue were examined and no injuries were noted. Care of the patient was returned to Anesthesia.      FINDINGS:   Easy mask.    SPECIMEN(S):   None    DRAINS: None    ESTIMATED BLOOD  LOSS: None    COMPLICATIONS: None    DISPOSITION: Stable to PACU    ATTENDING PRESENCE STATEMENT:  I was present and participating for the entire procedure from beginning to completion.

## 2024-03-10 NOTE — ANESTHESIA POSTPROCEDURE EVALUATION
Patient: Shannon Beckwith    Procedure: Procedure(s):  Microdirect laryngoscopy and Airway dilation       Anesthesia Type:  General    Note:  Disposition: Inpatient   Postop Pain Control: Uneventful            Sign Out: Well controlled pain   PONV: No   Neuro/Psych: Uneventful            Sign Out: Acceptable/Baseline neuro status   Airway/Respiratory: Uneventful            Sign Out: AIRWAY IN SITU/Resp. Support   CV/Hemodynamics: Uneventful            Sign Out: Acceptable CV status; No obvious hypovolemia; No obvious fluid overload   Other NRE: NONE   DID A NON-ROUTINE EVENT OCCUR? No           Last vitals:  Vitals Value Taken Time   /110 03/10/24 0930   Temp 36.2  C (97.1  F) 03/10/24 0902   Pulse 80 03/10/24 0940   Resp 8 03/10/24 0941   SpO2 96 % 03/10/24 0941   Vitals shown include unfiled device data.    Electronically Signed By: Trung Barron MD  March 10, 2024  9:43 AM

## 2024-03-10 NOTE — ANESTHESIA CARE TRANSFER NOTE
Patient: Shannon Beckwith    Procedure: Procedure(s):  Microdirect laryngoscopy and Airway dilation       Diagnosis: * No pre-op diagnosis entered *  Diagnosis Additional Information: No value filed.    Anesthesia Type:   No value filed.     Note:    Oropharynx: oropharynx clear of all foreign objects  Level of Consciousness: awake  Oxygen Supplementation: room air    Independent Airway: airway patency satisfactory and stable  Dentition: dentition unchanged  Vital Signs Stable: post-procedure vital signs reviewed and stable  Report to RN Given: handoff report given  Patient transferred to: PACU    Handoff Report: Identifed the Patient, Identified the Reponsible Provider, Reviewed the pertinent medical history, Discussed the surgical course, Reviewed Intra-OP anesthesia mangement and issues during anesthesia, Set expectations for post-procedure period and Allowed opportunity for questions and acknowledgement of understanding      Vitals:  Vitals Value Taken Time   /85 03/10/24 0905   Temp 36.2  C (97.1  F) 03/10/24 0902   Pulse 72 03/10/24 0913   Resp 11 03/10/24 0913   SpO2 98 % 03/10/24 0913   Vitals shown include unfiled device data.    Electronically Signed By: Roz Jones MD  March 10, 2024  9:13 AM

## 2024-03-11 ENCOUNTER — PATIENT OUTREACH (OUTPATIENT)
Dept: OTOLARYNGOLOGY | Facility: CLINIC | Age: 64
End: 2024-03-11
Payer: COMMERCIAL

## 2024-03-11 ENCOUNTER — PATIENT OUTREACH (OUTPATIENT)
Dept: CARE COORDINATION | Facility: CLINIC | Age: 64
End: 2024-03-11
Payer: COMMERCIAL

## 2024-03-11 LAB
ATRIAL RATE - MUSE: NORMAL BPM
ATRIAL RATE - MUSE: NORMAL BPM
DIASTOLIC BLOOD PRESSURE - MUSE: NORMAL MMHG
DIASTOLIC BLOOD PRESSURE - MUSE: NORMAL MMHG
INTERPRETATION ECG - MUSE: NORMAL
INTERPRETATION ECG - MUSE: NORMAL
P AXIS - MUSE: NORMAL DEGREES
P AXIS - MUSE: NORMAL DEGREES
PR INTERVAL - MUSE: NORMAL MS
PR INTERVAL - MUSE: NORMAL MS
QRS DURATION - MUSE: 84 MS
QRS DURATION - MUSE: 86 MS
QT - MUSE: 342 MS
QT - MUSE: 368 MS
QTC - MUSE: 434 MS
QTC - MUSE: 440 MS
R AXIS - MUSE: 15 DEGREES
R AXIS - MUSE: 20 DEGREES
SYSTOLIC BLOOD PRESSURE - MUSE: NORMAL MMHG
SYSTOLIC BLOOD PRESSURE - MUSE: NORMAL MMHG
T AXIS - MUSE: 13 DEGREES
T AXIS - MUSE: 45 DEGREES
VENTRICULAR RATE- MUSE: 86 BPM
VENTRICULAR RATE- MUSE: 97 BPM

## 2024-03-11 NOTE — PROGRESS NOTES
Called patient to check in after procedure. Patient is doing well after her procedure. Patient noted that her new peak flow was 210, patient will do her peak flow twice a month and let clinic know if numbers drop. Patient noted that she found the consults from the hospital were very helpful, and she has her sister-in-law to help her coordinate appointments. Patient understands the medication and treatments that were recommended to her by the care team and will reach out with any other questions or concerns in the meantime. Patient was grateful for the call and noted no abnormal findings after surgery. Writer confirmed appointment with patient for follow up and expressed the need and importance of patient attending. Patient was agreeable and verbalized understanding of the situation. Areli Campoverde RN on 3/11/2024 at 9:22 AM

## 2024-03-11 NOTE — PROGRESS NOTES
Connected Care Resource Center: [facility]    Background: Transitional Care Management program identified per system criteria and reviewed by [facility] team for possible outreach.    Assessment: Upon chart review, CCR Team member will not proceed with patient outreach related to this episode of Transitional Care Management program due to reason below:    Patient has active communication with a nurse, provider or care team for reason of post-hospital follow up plan.  Outreach call by [facility] team not indicated to minimize duplicative efforts.     Plan: Transitional Care Management episode addressed appropriately per reason noted above.      Christelle Ramos  Community Health Worker  [facility]  [phone]       *Connected Care Resource Team does NOT follow patient ongoing. Referrals are identified based on internal discharge reports and the outreach is to ensure patient has an understanding of their discharge instructions.

## 2024-03-12 DIAGNOSIS — Z98.84 HISTORY OF GASTRIC BYPASS: Primary | ICD-10-CM

## 2024-03-12 DIAGNOSIS — F10.20 ALCOHOLISM (H): ICD-10-CM

## 2024-03-12 LAB
PATH REPORT.COMMENTS IMP SPEC: NORMAL
PATH REPORT.COMMENTS IMP SPEC: NORMAL
PATH REPORT.FINAL DX SPEC: NORMAL
PATH REPORT.GROSS SPEC: NORMAL
PATH REPORT.MICROSCOPIC SPEC OTHER STN: NORMAL
PATH REPORT.RELEVANT HX SPEC: NORMAL
PHOTO IMAGE: NORMAL

## 2024-04-02 ENCOUNTER — TELEPHONE (OUTPATIENT)
Dept: OTOLARYNGOLOGY | Facility: CLINIC | Age: 64
End: 2024-04-02
Payer: COMMERCIAL

## 2024-04-02 NOTE — TELEPHONE ENCOUNTER
Left vm message for patient in regards to possibly changing appt time to 9:30am due to a cancellation. Writers call back information provided on vm.

## 2024-04-03 ENCOUNTER — PATIENT OUTREACH (OUTPATIENT)
Dept: OTOLARYNGOLOGY | Facility: CLINIC | Age: 64
End: 2024-04-03
Payer: COMMERCIAL

## 2024-04-03 NOTE — PROGRESS NOTES
Called patient to offer earlier appointment time. Asked patient return call using direct number for call back. Areli Campoverde RN on 4/3/2024 at 12:49 PM

## 2024-04-05 ENCOUNTER — PATIENT OUTREACH (OUTPATIENT)
Dept: OTOLARYNGOLOGY | Facility: CLINIC | Age: 64
End: 2024-04-05

## 2024-04-05 NOTE — PROGRESS NOTES
Patient called to let the care team know that she is unable to make the appointment today as she is sick. Patient stated they will rescheduled. Areli Campoverde RN on 4/5/2024 at 7:14 AM

## 2024-04-24 ENCOUNTER — TELEPHONE (OUTPATIENT)
Dept: OTOLARYNGOLOGY | Facility: CLINIC | Age: 64
End: 2024-04-24
Payer: COMMERCIAL

## 2024-04-24 NOTE — TELEPHONE ENCOUNTER
[facility] Call Center    Phone Message    May a detailed message be left on voicemail: yes     Reason for Call: Other: Pt is stating she needs to reschedule a post op appt. Please call to discuss. Thank you     Action Taken: Message routed to:  [facility] ENT    Travel Screening: Not Applicable

## 2024-05-06 ENCOUNTER — MYC MEDICAL ADVICE (OUTPATIENT)
Dept: OTOLARYNGOLOGY | Facility: CLINIC | Age: 64
End: 2024-05-06
Payer: COMMERCIAL

## 2024-05-08 ENCOUNTER — TELEPHONE (OUTPATIENT)
Dept: OTOLARYNGOLOGY | Facility: CLINIC | Age: 64
End: 2024-05-08
Payer: COMMERCIAL

## 2024-05-08 NOTE — TELEPHONE ENCOUNTER
"The soonest available for this pt is 8.16.24. The pt indicated that Dr. Del Rio wanted to \"keep a close eye\" on her after the surgery. Is this date ok? And did she need the SPT also? Please call the pt if there are any changes. Thanks.  "

## 2025-02-22 ENCOUNTER — HEALTH MAINTENANCE LETTER (OUTPATIENT)
Age: 65
End: 2025-02-22

## (undated) DEVICE — CAST PADDING 4" COTTON WEBRIL UNSTERILE 9084

## (undated) DEVICE — SOL NACL 0.9% IRRIG 3000ML BAG 2B7477

## (undated) DEVICE — DRILL BIT S&N FLEXIBLE REFLECTION 50MM 71362950

## (undated) DEVICE — GOWN IMPERVIOUS BREATHABLE SMART XLG 89045

## (undated) DEVICE — DRSG TELFA 3X8" 1238

## (undated) DEVICE — DEVICE RETRIEVER HEWSON 71111579

## (undated) DEVICE — SPONGE COTTONOID 1/2X1/2" 80-1400

## (undated) DEVICE — SPONGE COTTONOID 1/2X1 1/2" 1-STRING 80-1404

## (undated) DEVICE — PAD FOAM MCGUIRE KIT 0814-0150

## (undated) DEVICE — DRSG KERLIX FLUFFS X5

## (undated) DEVICE — SOL WATER IRRIG 1000ML BOTTLE 2F7114

## (undated) DEVICE — DRAPE IOBAN INCISE 23X17" 6650EZ

## (undated) DEVICE — SYR 10ML LL W/O NDL 302995

## (undated) DEVICE — HOOD FLYTE W/PEELAWAY 408-800-100

## (undated) DEVICE — SPONGE SURGIFOAM 50

## (undated) DEVICE — GOWN SM/MED DISP 9505

## (undated) DEVICE — PITCHER STERILE 1000ML  SSK9004A

## (undated) DEVICE — SU ETHIBOND 1 CT-1 30" X425H

## (undated) DEVICE — DRSG XEROFORM 1X8"

## (undated) DEVICE — BNDG ROLLER GAUZE CONFORM 4"X4YD 41-54

## (undated) DEVICE — GLOVE PROTEXIS BLUE W/NEU-THERA 8.5  2D73EB85

## (undated) DEVICE — GLOVE BIOGEL PI ULTRATOUCH G SZ 6.5 42165

## (undated) DEVICE — DRAPE STERI TOWEL LG 1010

## (undated) DEVICE — GLOVE PROTEXIS POWDER FREE 8.5 ORTHOPEDIC 2D73ET85

## (undated) DEVICE — LINEN TOWEL PACK X5 5464

## (undated) DEVICE — PREP CHLORAPREP 26ML TINTED ORANGE  260815

## (undated) DEVICE — SYR 10ML SLIP TIP W/O NDL 303134

## (undated) DEVICE — PREP SKIN SCRUB TRAY 4461A

## (undated) DEVICE — PACK ENT ENDOSCOPY UMMC

## (undated) DEVICE — GOWN XLG DISP 9545

## (undated) DEVICE — DRAPE SHEET REV FOLD 3/4 9349

## (undated) DEVICE — ESU GROUND PAD UNIVERSAL W/O CORD

## (undated) DEVICE — GLOVE PROTEXIS BLUE W/NEU-THERA 7.5  2D73EB75

## (undated) DEVICE — SPONGE LAP 18X18" X8435

## (undated) DEVICE — PACK TOTAL HIP W/POUCH SOP15HPFSM

## (undated) DEVICE — NDL BLUNT 18GA 1" W/O FILTER 305181

## (undated) DEVICE — SYR INFLATOR AND GAUGE 60ML M00550601

## (undated) DEVICE — CAST PADDING 4" UNSTERILE 9044

## (undated) DEVICE — GLOVE PROTEXIS MICRO 7.0  2D73PM70

## (undated) DEVICE — GLOVE PROTEXIS POWDER FREE 7.5 ORTHOPEDIC 2D73ET75

## (undated) DEVICE — SOL NACL 0.9% IRRIG 1000ML BOTTLE 2F7124

## (undated) DEVICE — IMM LIMB ELEVATOR DC40-0203

## (undated) DEVICE — CLOSURE SYS SKIN PREMIERPRO EXOFIN FUSION 4X22CM STRL 3472

## (undated) DEVICE — GOWN XXLG REINFORCED 9071EL

## (undated) DEVICE — MANIFOLD NEPTUNE 4 PORT 700-20

## (undated) DEVICE — SU FIBERWIRE 5 CCS-1 BLUE  AR-7211

## (undated) DEVICE — NDL 18GA 1.5" 305196

## (undated) DEVICE — BLADE SAW SAGITTAL STRK 18X90X1.27MM HD SYS 6 6118-127-090

## (undated) DEVICE — DRSG GAUZE 4X4" 3033

## (undated) DEVICE — BLADE KNIFE SURG 15 371115

## (undated) DEVICE — DRAPE POUCH INSTRUMENT 1018

## (undated) DEVICE — DRAPE CASETTE RADIOLOGY 23X25" 1011

## (undated) DEVICE — SUCTION IRR SYSTEM W/O TIP INTERPULSE HANDPIECE 0210-100-000

## (undated) DEVICE — LABEL MEDICATION SYSTEM 3303-P

## (undated) DEVICE — STPL SKIN 35W APPOSE 8886803712

## (undated) DEVICE — CAST PLASTER SPLINT 5X30" 7395

## (undated) DEVICE — NDL SPINAL 18GA 3.5" 405184

## (undated) DEVICE — SU VICRYL 1 CTX CR 8X18" J765D

## (undated) DEVICE — SPONGE RAY-TEC 4X8" 7318

## (undated) DEVICE — SU VICRYL 0 CP-1 27" J467H

## (undated) DEVICE — BNDG ELASTIC 6" DBL LENGTH UNSTERILE 6611-16

## (undated) DEVICE — ENDO TOOTH QUICK GUARD CONFORMING PROTECTION

## (undated) DEVICE — SU VICRYL 2-0 X-1 27" UND J459H

## (undated) DEVICE — PIN GUIDE DEPUY ACE KYLE 3.2MMX9" THRD  14012-9

## (undated) DEVICE — SPONGE COTTONOID NEURO 1/2"X1/2" 30-054

## (undated) DEVICE — SYR 30ML LL W/O NDL 302832

## (undated) DEVICE — SOLUTION WOUND CLEANSING 3/4OZ 10% PVP EA-L3011FB-50

## (undated) DEVICE — BLADE SAW SAGITTAL LINVATEC 5023-118

## (undated) DEVICE — PACK EXTREMITY SOP15EXFSD

## (undated) DEVICE — DILATOR CRE PULM BALLOON 12-13.5-15MMX75CM 3CM WIRE 5035

## (undated) DEVICE — NDL BUTTERFLY 25GA .75" 367298

## (undated) DEVICE — TOURNIQUET CUFF 30" STERILE

## (undated) DEVICE — SU PROLENE 3-0 PS-2 18" 8687H

## (undated) DEVICE — GLOVE SENSICARE PI MICRO PF 8.0 LATEX FREE MSG9680

## (undated) DEVICE — GLOVE PROTEXIS MICRO 8.5  2D73PM85

## (undated) DEVICE — SPONGE COTTONOID 1/4X1/4" 80-1399

## (undated) DEVICE — GLOVE PROTEXIS W/NEU-THERA 8.0  2D73TE80

## (undated) DEVICE — LINEN GOWN XLG 5407

## (undated) RX ORDER — PROPOFOL 10 MG/ML
INJECTION, EMULSION INTRAVENOUS
Status: DISPENSED
Start: 2021-07-27

## (undated) RX ORDER — DEXAMETHASONE SODIUM PHOSPHATE 4 MG/ML
INJECTION, SOLUTION INTRA-ARTICULAR; INTRALESIONAL; INTRAMUSCULAR; INTRAVENOUS; SOFT TISSUE
Status: DISPENSED
Start: 2017-03-30

## (undated) RX ORDER — FENTANYL CITRATE 50 UG/ML
INJECTION, SOLUTION INTRAMUSCULAR; INTRAVENOUS
Status: DISPENSED
Start: 2021-07-27

## (undated) RX ORDER — KETOROLAC TROMETHAMINE 30 MG/ML
INJECTION, SOLUTION INTRAMUSCULAR; INTRAVENOUS
Status: DISPENSED
Start: 2017-03-30

## (undated) RX ORDER — ONDANSETRON 2 MG/ML
INJECTION INTRAMUSCULAR; INTRAVENOUS
Status: DISPENSED
Start: 2021-07-27

## (undated) RX ORDER — FENTANYL CITRATE 50 UG/ML
INJECTION, SOLUTION INTRAMUSCULAR; INTRAVENOUS
Status: DISPENSED
Start: 2024-03-08

## (undated) RX ORDER — ONDANSETRON 2 MG/ML
INJECTION INTRAMUSCULAR; INTRAVENOUS
Status: DISPENSED
Start: 2017-03-30

## (undated) RX ORDER — ACETAMINOPHEN 325 MG/1
TABLET ORAL
Status: DISPENSED
Start: 2024-03-08

## (undated) RX ORDER — HYDRALAZINE HYDROCHLORIDE 20 MG/ML
INJECTION INTRAMUSCULAR; INTRAVENOUS
Status: DISPENSED
Start: 2024-03-08

## (undated) RX ORDER — FENTANYL CITRATE 50 UG/ML
INJECTION, SOLUTION INTRAMUSCULAR; INTRAVENOUS
Status: DISPENSED
Start: 2017-03-30

## (undated) RX ORDER — ACETAMINOPHEN 325 MG/1
TABLET ORAL
Status: DISPENSED
Start: 2021-07-27

## (undated) RX ORDER — FENTANYL CITRATE 0.05 MG/ML
INJECTION, SOLUTION INTRAMUSCULAR; INTRAVENOUS
Status: DISPENSED
Start: 2021-07-27

## (undated) RX ORDER — ONDANSETRON 2 MG/ML
INJECTION INTRAMUSCULAR; INTRAVENOUS
Status: DISPENSED
Start: 2024-03-10

## (undated) RX ORDER — HYDROMORPHONE HCL IN WATER/PF 6 MG/30 ML
PATIENT CONTROLLED ANALGESIA SYRINGE INTRAVENOUS
Status: DISPENSED
Start: 2021-07-27

## (undated) RX ORDER — BUPIVACAINE HYDROCHLORIDE 2.5 MG/ML
INJECTION, SOLUTION EPIDURAL; INFILTRATION; INTRACAUDAL
Status: DISPENSED
Start: 2017-03-30

## (undated) RX ORDER — DEXAMETHASONE SODIUM PHOSPHATE 4 MG/ML
INJECTION, SOLUTION INTRA-ARTICULAR; INTRALESIONAL; INTRAMUSCULAR; INTRAVENOUS; SOFT TISSUE
Status: DISPENSED
Start: 2024-03-08

## (undated) RX ORDER — LIDOCAINE HYDROCHLORIDE 20 MG/ML
INJECTION, SOLUTION EPIDURAL; INFILTRATION; INTRACAUDAL; PERINEURAL
Status: DISPENSED
Start: 2017-03-30

## (undated) RX ORDER — CEFAZOLIN SODIUM 2 G/100ML
INJECTION, SOLUTION INTRAVENOUS
Status: DISPENSED
Start: 2017-03-30

## (undated) RX ORDER — METOPROLOL TARTRATE 1 MG/ML
INJECTION, SOLUTION INTRAVENOUS
Status: DISPENSED
Start: 2024-03-08

## (undated) RX ORDER — METOPROLOL TARTRATE 50 MG
TABLET ORAL
Status: DISPENSED
Start: 2024-03-08

## (undated) RX ORDER — FENTANYL CITRATE 50 UG/ML
INJECTION, SOLUTION INTRAMUSCULAR; INTRAVENOUS
Status: DISPENSED
Start: 2024-03-10

## (undated) RX ORDER — EPINEPHRINE 0.1 MG/ML
INJECTION INTRAVENOUS
Status: DISPENSED
Start: 2024-03-08

## (undated) RX ORDER — LIDOCAINE HYDROCHLORIDE 20 MG/ML
INJECTION, SOLUTION EPIDURAL; INFILTRATION; INTRACAUDAL; PERINEURAL
Status: DISPENSED
Start: 2021-07-27

## (undated) RX ORDER — CEFAZOLIN SODIUM 1 G/3ML
INJECTION, POWDER, FOR SOLUTION INTRAMUSCULAR; INTRAVENOUS
Status: DISPENSED
Start: 2017-03-30

## (undated) RX ORDER — AMPICILLIN AND SULBACTAM 2; 1 G/1; G/1
INJECTION, POWDER, FOR SOLUTION INTRAMUSCULAR; INTRAVENOUS
Status: DISPENSED
Start: 2024-03-08

## (undated) RX ORDER — AMPICILLIN AND SULBACTAM 2; 1 G/1; G/1
INJECTION, POWDER, FOR SOLUTION INTRAMUSCULAR; INTRAVENOUS
Status: DISPENSED
Start: 2024-03-10

## (undated) RX ORDER — TRANEXAMIC ACID 650 MG/1
TABLET ORAL
Status: DISPENSED
Start: 2021-07-27

## (undated) RX ORDER — ESMOLOL HYDROCHLORIDE 10 MG/ML
INJECTION INTRAVENOUS
Status: DISPENSED
Start: 2024-03-10

## (undated) RX ORDER — HYDRALAZINE HYDROCHLORIDE 20 MG/ML
INJECTION INTRAMUSCULAR; INTRAVENOUS
Status: DISPENSED
Start: 2021-07-27

## (undated) RX ORDER — GLYCOPYRROLATE 0.2 MG/ML
INJECTION, SOLUTION INTRAMUSCULAR; INTRAVENOUS
Status: DISPENSED
Start: 2017-03-30

## (undated) RX ORDER — ESMOLOL HYDROCHLORIDE 10 MG/ML
INJECTION INTRAVENOUS
Status: DISPENSED
Start: 2024-03-08

## (undated) RX ORDER — CELECOXIB 200 MG/1
CAPSULE ORAL
Status: DISPENSED
Start: 2021-07-27

## (undated) RX ORDER — FENTANYL CITRATE-0.9 % NACL/PF 10 MCG/ML
PLASTIC BAG, INJECTION (ML) INTRAVENOUS
Status: DISPENSED
Start: 2024-03-10

## (undated) RX ORDER — CEFAZOLIN SODIUM 2 G/100ML
INJECTION, SOLUTION INTRAVENOUS
Status: DISPENSED
Start: 2021-07-27

## (undated) RX ORDER — PROPOFOL 10 MG/ML
INJECTION, EMULSION INTRAVENOUS
Status: DISPENSED
Start: 2017-03-30

## (undated) RX ORDER — DEXAMETHASONE SODIUM PHOSPHATE 4 MG/ML
INJECTION, SOLUTION INTRA-ARTICULAR; INTRALESIONAL; INTRAMUSCULAR; INTRAVENOUS; SOFT TISSUE
Status: DISPENSED
Start: 2024-03-10

## (undated) RX ORDER — ATENOLOL 25 MG/1
TABLET ORAL
Status: DISPENSED
Start: 2021-07-27

## (undated) RX ORDER — TRIAMCINOLONE ACETONIDE 40 MG/ML
INJECTION, SUSPENSION INTRA-ARTICULAR; INTRAMUSCULAR
Status: DISPENSED
Start: 2024-03-08